# Patient Record
Sex: FEMALE | Race: BLACK OR AFRICAN AMERICAN | NOT HISPANIC OR LATINO | Employment: UNEMPLOYED | URBAN - METROPOLITAN AREA
[De-identification: names, ages, dates, MRNs, and addresses within clinical notes are randomized per-mention and may not be internally consistent; named-entity substitution may affect disease eponyms.]

---

## 2017-01-27 ENCOUNTER — HOSPITAL ENCOUNTER (EMERGENCY)
Facility: HOSPITAL | Age: 47
Discharge: HOME | End: 2017-01-27
Attending: EMERGENCY MEDICINE
Payer: MEDICAID

## 2017-01-27 VITALS
RESPIRATION RATE: 18 BRPM | TEMPERATURE: 99 F | SYSTOLIC BLOOD PRESSURE: 119 MMHG | HEART RATE: 80 BPM | HEIGHT: 62 IN | OXYGEN SATURATION: 97 % | DIASTOLIC BLOOD PRESSURE: 68 MMHG | BODY MASS INDEX: 31.28 KG/M2 | WEIGHT: 170 LBS

## 2017-01-27 DIAGNOSIS — R25.2 LEG CRAMPS: ICD-10-CM

## 2017-01-27 DIAGNOSIS — M79.10 MUSCLE PAIN: Primary | ICD-10-CM

## 2017-01-27 DIAGNOSIS — R79.89 ELEVATED D-DIMER: ICD-10-CM

## 2017-01-27 DIAGNOSIS — R07.9 CHEST PAIN: ICD-10-CM

## 2017-01-27 LAB
ALBUMIN SERPL BCP-MCNC: 4.2 G/DL
ALP SERPL-CCNC: 68 U/L
ALT SERPL W/O P-5'-P-CCNC: 14 U/L
ANION GAP SERPL CALC-SCNC: 14 MMOL/L
AST SERPL-CCNC: 21 U/L
BASOPHILS # BLD AUTO: 0.03 K/UL
BASOPHILS NFR BLD: 0.3 %
BILIRUB SERPL-MCNC: 1.1 MG/DL
BNP SERPL-MCNC: <10 PG/ML
BUN SERPL-MCNC: 14 MG/DL
CALCIUM SERPL-MCNC: 10 MG/DL
CHLORIDE SERPL-SCNC: 100 MMOL/L
CK SERPL-CCNC: 129 U/L
CO2 SERPL-SCNC: 24 MMOL/L
CREAT SERPL-MCNC: 0.9 MG/DL
D DIMER PPP IA.FEU-MCNC: 1.15 MG/L FEU
DIFFERENTIAL METHOD: ABNORMAL
EOSINOPHIL # BLD AUTO: 0.2 K/UL
EOSINOPHIL NFR BLD: 2 %
ERYTHROCYTE [DISTWIDTH] IN BLOOD BY AUTOMATED COUNT: 12.9 %
EST. GFR  (AFRICAN AMERICAN): >60 ML/MIN/1.73 M^2
EST. GFR  (NON AFRICAN AMERICAN): >60 ML/MIN/1.73 M^2
GLUCOSE SERPL-MCNC: 83 MG/DL
HCT VFR BLD AUTO: 42.1 %
HGB BLD-MCNC: 14.9 G/DL
LIPASE SERPL-CCNC: 33 U/L
LYMPHOCYTES # BLD AUTO: 3.6 K/UL
LYMPHOCYTES NFR BLD: 38.5 %
MCH RBC QN AUTO: 28.8 PG
MCHC RBC AUTO-ENTMCNC: 35.4 %
MCV RBC AUTO: 81 FL
MONOCYTES # BLD AUTO: 0.9 K/UL
MONOCYTES NFR BLD: 9.4 %
NEUTROPHILS # BLD AUTO: 4.6 K/UL
NEUTROPHILS NFR BLD: 49.8 %
PLATELET # BLD AUTO: 281 K/UL
PMV BLD AUTO: 10.7 FL
POTASSIUM SERPL-SCNC: 3.4 MMOL/L
PROT SERPL-MCNC: 8.3 G/DL
RBC # BLD AUTO: 5.18 M/UL
SODIUM SERPL-SCNC: 138 MMOL/L
TROPONIN I SERPL DL<=0.01 NG/ML-MCNC: <0.006 NG/ML
WBC # BLD AUTO: 9.23 K/UL

## 2017-01-27 PROCEDURE — 96374 THER/PROPH/DIAG INJ IV PUSH: CPT

## 2017-01-27 PROCEDURE — 83880 ASSAY OF NATRIURETIC PEPTIDE: CPT

## 2017-01-27 PROCEDURE — 84484 ASSAY OF TROPONIN QUANT: CPT

## 2017-01-27 PROCEDURE — 83690 ASSAY OF LIPASE: CPT

## 2017-01-27 PROCEDURE — 25000003 PHARM REV CODE 250: Performed by: EMERGENCY MEDICINE

## 2017-01-27 PROCEDURE — 25500020 PHARM REV CODE 255: Performed by: EMERGENCY MEDICINE

## 2017-01-27 PROCEDURE — 85379 FIBRIN DEGRADATION QUANT: CPT

## 2017-01-27 PROCEDURE — 96375 TX/PRO/DX INJ NEW DRUG ADDON: CPT

## 2017-01-27 PROCEDURE — 63600175 PHARM REV CODE 636 W HCPCS: Performed by: EMERGENCY MEDICINE

## 2017-01-27 PROCEDURE — 85025 COMPLETE CBC W/AUTO DIFF WBC: CPT

## 2017-01-27 PROCEDURE — 82550 ASSAY OF CK (CPK): CPT

## 2017-01-27 PROCEDURE — 99285 EMERGENCY DEPT VISIT HI MDM: CPT | Mod: 25

## 2017-01-27 PROCEDURE — 80053 COMPREHEN METABOLIC PANEL: CPT

## 2017-01-27 RX ORDER — MORPHINE SULFATE 10 MG/ML
4 INJECTION INTRAMUSCULAR; INTRAVENOUS; SUBCUTANEOUS
Status: COMPLETED | OUTPATIENT
Start: 2017-01-27 | End: 2017-01-27

## 2017-01-27 RX ORDER — OXYCODONE AND ACETAMINOPHEN 5; 325 MG/1; MG/1
1 TABLET ORAL EVERY 4 HOURS PRN
Qty: 20 TABLET | Refills: 0 | Status: SHIPPED | OUTPATIENT
Start: 2017-01-27 | End: 2017-11-10 | Stop reason: SDUPTHER

## 2017-01-27 RX ORDER — OXYCODONE AND ACETAMINOPHEN 5; 325 MG/1; MG/1
1 TABLET ORAL
Status: COMPLETED | OUTPATIENT
Start: 2017-01-27 | End: 2017-01-27

## 2017-01-27 RX ORDER — ONDANSETRON 2 MG/ML
4 INJECTION INTRAMUSCULAR; INTRAVENOUS
Status: COMPLETED | OUTPATIENT
Start: 2017-01-27 | End: 2017-01-27

## 2017-01-27 RX ORDER — METHOCARBAMOL 750 MG/1
1500 TABLET, FILM COATED ORAL 3 TIMES DAILY
Qty: 30 TABLET | Refills: 0 | Status: SHIPPED | OUTPATIENT
Start: 2017-01-27 | End: 2017-02-01

## 2017-01-27 RX ADMIN — IOHEXOL 70 ML: 350 INJECTION, SOLUTION INTRAVENOUS at 08:01

## 2017-01-27 RX ADMIN — OXYCODONE HYDROCHLORIDE AND ACETAMINOPHEN 1 TABLET: 5; 325 TABLET ORAL at 10:01

## 2017-01-27 RX ADMIN — MORPHINE SULFATE 4 MG: 10 INJECTION INTRAVENOUS at 07:01

## 2017-01-27 RX ADMIN — ONDANSETRON 4 MG: 2 INJECTION INTRAMUSCULAR; INTRAVENOUS at 07:01

## 2017-01-27 NOTE — ED TRIAGE NOTES
"Pt presents to [facility] due to leg pain bilaterally and numbness which pt states its "like prasanth horse" . Pt states pain started yesterday after a 4 hour plane flight.   "

## 2017-01-27 NOTE — DISCHARGE INSTRUCTIONS
Take medications as directed. Follow-up with your primary care doctor. Return for any new or worsening complaints.  General Neck and Back Pain    Both neck and back pain are usually caused by injury to the muscles or ligaments of the spine. Sometimes the disks that separate each bone of the spine may cause pain by pressing on a nearby nerve. Back and neck pain may appear after a sudden twisting or bending force (such as in a car accident), or sometimes after a simple awkward movement. In either case, muscle spasm is often present and adds to the pain.  Acute neck and back pain usually gets better in 1 to 2 weeks. Pain related to disk disease, arthritis in the spinal joints or spinal stenosis (narrowing of the spinal canal) can become chronic and last for months or years.  Back and neck pain are common problems. Most people feel better in 1 or 2 weeks, and most of the rest in 1 to 2 months. Most people can remain active.  People experience and describe pain differently.  · Pain can be sharp, stabbing, shooting, aching, cramping, or burning  · Movement, standing, bending, lifting, sitting, or walking may worsen the pain  · Pain can be localized to one spot or area, or it can be more generalized  · Pain can spread or radiate upwards, downwards, to the front, or go down your arms  · Muscle spasm may occur.  Most of the time mechanical problems with the muscles or spine cause the pain. it is usually caused by an injury, whether known or not, to the muscles or ligaments. While illnesses can cause back pain, it is usually not caused by a serious illness. Pain is usually related to physical activity, whether sports, exercise, work, or normal activity. Sometimes it can occur without an identifiable cause. This can happen simply by stretching or moving wrong, without noting pain at the time. Other causes include:  · Overexertion, lifting, pushing, pulling incorrectly or too aggressively.  · Sudden twisting, bending or  stretching from an accident (car or fall), or accidental movement.  · Poor posture  · Poor conditioning, lack of regular exercise  · Spinal disc disease or arthritis  · Stress  · Pregnancy, or illness like appendicitis, bladder or kidney infection, pelvic infections   Home care  · For neck pain: Use a comfortable pillow that supports the head and keeps the spine in a neutral position. The position of the head should not be tilted forward or backward.  · When in bed, try to find a position of comfort. A firm mattress is best. Try lying flat on your back with pillows under your knees. You can also try lying on your side with your knees bent up towards your chest and a pillow between your knees.  · At first, do not try to stretch out the sore spots. If there is a strain, it is not like the good soreness you get after exercising without an injury. In this case, stretching may make it worse.  · Avoid prolonged sitting, long car rides or travel. This puts more stress on the lower back than standing or walking.  · During the first 24 to 72 hours after an injury, apply an ice pack to the painful area for 20 minutes and then remove it for 20 minutes over a period of 60 to 90 minutes or several times a day.   · You can alternate ice and heat therapies. Talk with your healthcare provider about the best treatment for your back or neck pain. As a safety precaution, do not use a heating pad at bedtime. Sleeping with a heating pad can lead to skin burns or tissue damage.  · Therapeutic massage can help relax the back and neck muscles without stretching them.  · Be aware of safe lifting methods and do not lift anything over 15 pounds until all the pain is gone.  Medications  Talk to your healthcare provider before using medicine, especially if you have other medical problems or are taking other medicines.  · You may use over-the-counter medicine to control pain, unless another pain medicine was prescribed. If you have chronic  conditions like diabetes, liver or kidney disease, stomach ulcers,  gastrointestinal bleeding, or are taking blood thinner medicines.  · Be careful if you are given pain medicines, narcotics, or medicine for muscle spasm. They can cause drowsiness, and can affect your coordination, reflexes, and judgment. Do not drive or operate heavy machinery.  Follow-up care  Follow up with your healthcare provider, or as advised. Physical therapy or further tests may be needed.  If X-rays were taken, you will be notified of any new findings that may affect your care.  Call 911  Seek emergency medical care if any of the following occur:  · Trouble breathing  · Confusion  · Very drowsy or trouble awakening  · Fainting or loss of consciousness  · Rapid or very slow heart rate  · Loss of bowel or bladder control  When to seek medical advice  Call your healthcare provider right away if any of these occur:  · Pain becomes worse or spreads into your arms or legs  · Weakness, numbness or pain in one or both arms or legs  · Numbness in the groin area  · Difficulty walking  · Fever of 100.4ºF (38ºC) or higher, or as directed by your healthcare provider  © 2542-8491 [employer]. [address] [place] [address]. All rights reserved. This information is not intended as a substitute for professional medical care. Always follow your healthcare professional's instructions.

## 2017-01-27 NOTE — ED NOTES
"Pt reports pain 9/10 left shoulder and left kidney.  Also reports something is "wrong with her big toenails."  Physician notified.  "

## 2017-01-27 NOTE — ED AVS SNAPSHOT
[facility]  [place] [address]               Kylie Felix   2017  5:34 AM   ED    Description:  Female : 1970   Department:  [facility]           Your Care was Coordinated By:     Provider Role From To    Demetria Wilkes MD Attending Provider 17 0601 --      Reason for Visit     Leg Pain           Diagnoses this Visit        Comments    Muscle pain    -  Primary     Chest pain         Leg cramps         Elevated d-dimer           ED Disposition     ED Disposition Condition Comment    Discharge             To Do List           Follow-up Information     Follow up with Corina Diego MD. Schedule an appointment as soon as possible for a visit in 2 days.    Specialty:  Hospitalist    Contact information:    [address] [facility]  [place] [id number] [phone]          Follow up with Chaz Stewart III, MD. Schedule an appointment as soon as possible for a visit in 2 days.    Specialty:  Surgery    Contact information:    [address] [facility] [address] [phone]          Follow up with Karin Mancini DPM. Schedule an appointment as soon as possible for a visit in 2 days.    Specialty:  Podiatry    Contact information:    [address] [facility] [id number] [phone]         These Medications        Disp Refills Start End    oxycodone-acetaminophen (PERCOCET) 5-325 mg per tablet 20 tablet 0 2017     Take 1 tablet by mouth every 4 (four) hours as needed for Pain. - Oral    methocarbamol (ROBAXIN) 750 MG Tab 30 tablet 0 2017    Take 2 tablets (1,500 mg total) by mouth 3 (three) times daily. - Oral      [facility] On Call Nurse Care Line -  Assistance  Registered nurses in the [facility] provide clinical advisement, health education, appointment booking, and other advisory services.  Call for this free service at [phone].              Medications           Message regarding Medications     Verify the changes and/or additions to your medication regime listed below are the same as discussed with your clinician today.  If any of these changes or additions are incorrect, please notify your healthcare provider.        START taking these NEW medications        Refills    methocarbamol (ROBAXIN) 750 MG Tab 0    Sig: Take 2 tablets (1,500 mg total) by mouth 3 (three) times daily.    Class: Print    Route: Oral      These medications were administered today        Dose Freq    morphine injection 4 mg 4 mg ED 1 Time    Sig: Inject 0.4 mLs (4 mg total) into the vein ED 1 Time.    Class: Normal    Route: Intravenous    ondansetron injection 4 mg 4 mg ED 1 Time    Sig: Inject 4 mg into the vein ED 1 Time.    Class: Normal    Route: Intravenous    omnipaque 350 iohexol 70 mL 70 mL IMG once as needed    Sig: Inject 70 mLs into the vein ONCE PRN for contrast.    Class: Normal    Route: Intravenous    oxycodone-acetaminophen 5-325 mg per tablet 1 tablet 1 tablet ED 1 Time    Sig: Take 1 tablet by mouth ED 1 Time.    Class: Normal    Route: Oral      STOP taking these medications     lisinopril-hydrochlorothiazide (PRINZIDE,ZESTORETIC) 10-12.5 mg per tablet Take 1 tablet by mouth once daily.    naproxen (NAPROSYN) 375 MG tablet Take 1 tablet (375 mg total) by mouth 2 (two) times daily with meals.           Verify that the below list of medications is an accurate representation of the medications you are currently taking.  If none reported, the list may be blank. If incorrect, please contact your healthcare provider. Carry this list with you in case of emergency.           Current Medications     hydrochlorothiazide (HYDRODIURIL) 12.5 MG Tab Take 12.5 mg by mouth once daily.    ibuprofen (ADVIL,MOTRIN) 800 MG tablet Take 800 mg by mouth 3 (three) times daily.    acetaminophen (TYLENOL) 500 MG tablet Take 2 tablets (1,000 mg total) by mouth 3 (three) times daily  "as needed for Pain.    methocarbamol (ROBAXIN) 750 MG Tab Take 2 tablets (1,500 mg total) by mouth 3 (three) times daily.    oxycodone-acetaminophen (PERCOCET) 5-325 mg per tablet Take 1 tablet by mouth every 4 (four) hours as needed for Pain.    oxycodone-acetaminophen 5-325 mg per tablet 1 tablet Take 1 tablet by mouth ED 1 Time.           Clinical Reference Information           Your Vitals Were     BP Pulse Temp Resp Height Weight    121/75 (BP Location: Right arm, Patient Position: Lying, BP Method: Automatic) 78 98.5 °F (36.9 °C) (Oral) 18 5' 2" (1.575 m) 77.1 kg (170 lb)    Last Period SpO2 BMI          (LMP Unknown) 96% 31.09 kg/m2        Allergies as of 1/27/2017        Reactions    Sulfa (Sulfonamide Antibiotics) Hives, Swelling      Immunizations Administered on Date of Encounter - 1/27/2017     None      ED Micro, Lab, POCT     Start Ordered       Status Ordering Provider    01/27/17 0630 01/27/17 0630  CBC auto differential  STAT      Final result     01/27/17 0630 01/27/17 0630  Comprehensive metabolic panel  STAT      Final result     01/27/17 0630 01/27/17 0630  Lipase  STAT      Final result     01/27/17 0630 01/27/17 0630  D dimer, quantitative  STAT      Final result     01/27/17 0630 01/27/17 0630  CPK  STAT      Final result     01/27/17 0630 01/27/17 0630  Troponin I  STAT      Final result     01/27/17 0630 01/27/17 0630  Brain natriuretic peptide  STAT      Final result       ED Imaging Orders     Start Ordered       Status Ordering Provider    01/27/17 0859 01/27/17 0812  US Lower Extremity Veins Bilateral  1 time imaging      Final result     01/27/17 0813 01/27/17 0812  CTA Chest Non-Coronary (PE Study)  1 time imaging      Final result     01/27/17 0813 01/27/17 0812    1 time imaging,   Status:  Canceled      Canceled     01/27/17 0630 01/27/17 0630  X-Ray Chest AP Portable  1 time imaging      Final result         Discharge Instructions         Take medications as directed. Follow-up " with your primary care doctor. Return for any new or worsening complaints.  General Neck and Back Pain    Both neck and back pain are usually caused by injury to the muscles or ligaments of the spine. Sometimes the disks that separate each bone of the spine may cause pain by pressing on a nearby nerve. Back and neck pain may appear after a sudden twisting or bending force (such as in a car accident), or sometimes after a simple awkward movement. In either case, muscle spasm is often present and adds to the pain.  Acute neck and back pain usually gets better in 1 to 2 weeks. Pain related to disk disease, arthritis in the spinal joints or spinal stenosis (narrowing of the spinal canal) can become chronic and last for months or years.  Back and neck pain are common problems. Most people feel better in 1 or 2 weeks, and most of the rest in 1 to 2 months. Most people can remain active.  People experience and describe pain differently.  · Pain can be sharp, stabbing, shooting, aching, cramping, or burning  · Movement, standing, bending, lifting, sitting, or walking may worsen the pain  · Pain can be localized to one spot or area, or it can be more generalized  · Pain can spread or radiate upwards, downwards, to the front, or go down your arms  · Muscle spasm may occur.  Most of the time mechanical problems with the muscles or spine cause the pain. it is usually caused by an injury, whether known or not, to the muscles or ligaments. While illnesses can cause back pain, it is usually not caused by a serious illness. Pain is usually related to physical activity, whether sports, exercise, work, or normal activity. Sometimes it can occur without an identifiable cause. This can happen simply by stretching or moving wrong, without noting pain at the time. Other causes include:  · Overexertion, lifting, pushing, pulling incorrectly or too aggressively.  · Sudden twisting, bending or stretching from an accident (car or fall), or  accidental movement.  · Poor posture  · Poor conditioning, lack of regular exercise  · Spinal disc disease or arthritis  · Stress  · Pregnancy, or illness like appendicitis, bladder or kidney infection, pelvic infections   Home care  · For neck pain: Use a comfortable pillow that supports the head and keeps the spine in a neutral position. The position of the head should not be tilted forward or backward.  · When in bed, try to find a position of comfort. A firm mattress is best. Try lying flat on your back with pillows under your knees. You can also try lying on your side with your knees bent up towards your chest and a pillow between your knees.  · At first, do not try to stretch out the sore spots. If there is a strain, it is not like the good soreness you get after exercising without an injury. In this case, stretching may make it worse.  · Avoid prolonged sitting, long car rides or travel. This puts more stress on the lower back than standing or walking.  · During the first 24 to 72 hours after an injury, apply an ice pack to the painful area for 20 minutes and then remove it for 20 minutes over a period of 60 to 90 minutes or several times a day.   · You can alternate ice and heat therapies. Talk with your healthcare provider about the best treatment for your back or neck pain. As a safety precaution, do not use a heating pad at bedtime. Sleeping with a heating pad can lead to skin burns or tissue damage.  · Therapeutic massage can help relax the back and neck muscles without stretching them.  · Be aware of safe lifting methods and do not lift anything over 15 pounds until all the pain is gone.  Medications  Talk to your healthcare provider before using medicine, especially if you have other medical problems or are taking other medicines.  · You may use over-the-counter medicine to control pain, unless another pain medicine was prescribed. If you have chronic conditions like diabetes, liver or kidney disease,  stomach ulcers,  gastrointestinal bleeding, or are taking blood thinner medicines.  · Be careful if you are given pain medicines, narcotics, or medicine for muscle spasm. They can cause drowsiness, and can affect your coordination, reflexes, and judgment. Do not drive or operate heavy machinery.  Follow-up care  Follow up with your healthcare provider, or as advised. Physical therapy or further tests may be needed.  If X-rays were taken, you will be notified of any new findings that may affect your care.  Call 911  Seek emergency medical care if any of the following occur:  · Trouble breathing  · Confusion  · Very drowsy or trouble awakening  · Fainting or loss of consciousness  · Rapid or very slow heart rate  · Loss of bowel or bladder control  When to seek medical advice  Call your healthcare provider right away if any of these occur:  · Pain becomes worse or spreads into your arms or legs  · Weakness, numbness or pain in one or both arms or legs  · Numbness in the groin area  · Difficulty walking  · Fever of 100.4ºF (38ºC) or higher, or as directed by your healthcare provider  © 6491-6563 [employer]. [address] [place] [address]. All rights reserved. This information is not intended as a substitute for professional medical care. Always follow your healthcare professional's instructions.           [facility] complies with applicable Federal civil rights laws and does not discriminate on the basis of race, color, national origin, age, disability, or sex.        Language Assistance Services     ATTENTION: Language assistance services are available, free of charge. Please call [phone].      ATENCIÓN: Si habla español, tiene a duval disposición servicios gratuitos de asistencia lingüística. Llame al [phone].     CHÚ Ý: N?u b?n nói Ti?ng Vi?t, có các d?ch v? h? tr? ngôn ng? mi?n phí dành cho b?n. G?i s? [phone].

## 2017-01-27 NOTE — ED PROVIDER NOTES
Encounter Date: 2017    SCRIBE #1 NOTE: I, Ramin Arabella, am scribing for, and in the presence of, Demetria Wilkes MD. Other sections scribed: HPI, ROS.       History     Chief Complaint   Patient presents with    Leg Pain     pt made round trip flight 4 hr e[address], complains of numbness and tingling in her bilateral lower extremities     Review of patient's allergies indicates:   Allergen Reactions    Sulfa (sulfonamide antibiotics) Hives and Swelling     HPI Comments: CC: Leg Pain  HPI: This 46 y.o. female with HTN, fatty liver and Hx of gall stones, tumor removal, hysterectomy presents to the ED c/o severe cramping pains to bilateral legs and tingling in her toes that developed yesterday afternoon following a plane ride from [place]. He reports associated sharp, non-radiating episodic chest pain and SOB; Sx are acute. He denies Hx of similar Sx. She reports recent stress; she denies any recent hormone use, surgeries, or trauma.  Denies personal history of pulmonary embolism or DVT.  She denies fever, cough, N/V.      The history is provided by the patient.     Past Medical History   Diagnosis Date    Fatty liver     Gall stones 2015    Hypertension      No past medical history pertinent negatives.  Past Surgical History   Procedure Laterality Date     section, low transverse      Tumor removal      Hysterectomy       Family History   Problem Relation Age of Onset    Breast cancer Neg Hx     Colon cancer Neg Hx     Ovarian cancer Neg Hx      Social History   Substance Use Topics    Smoking status: Never Smoker    Smokeless tobacco: None    Alcohol use No     Review of Systems   Constitutional: Negative for activity change, chills and fever.   HENT: Negative for congestion, drooling, ear pain, sore throat, tinnitus, trouble swallowing and voice change.    Eyes: Negative for photophobia, pain and visual disturbance.   Respiratory: Positive for shortness of breath. Negative for apnea,  cough, choking, chest tightness, wheezing and stridor.    Cardiovascular: Positive for chest pain.   Gastrointestinal: Negative for abdominal distention, abdominal pain, constipation, diarrhea, nausea and vomiting.   Genitourinary: Negative for difficulty urinating, dysuria, flank pain, frequency and hematuria.   Musculoskeletal: Positive for myalgias (bilateral legs). Negative for arthralgias, back pain, gait problem, joint swelling, neck pain and neck stiffness.   Skin: Negative for color change, pallor, rash and wound.   Neurological: Negative for dizziness, seizures, syncope, weakness, light-headedness, numbness and headaches.        (+) tingling to bilateral feet   Psychiatric/Behavioral: Negative for agitation and confusion.       Physical Exam   Initial Vitals   BP Pulse Resp Temp SpO2   01/27/17 0529 01/27/17 0529 01/27/17 0529 01/27/17 0529 01/27/17 0529   135/84 94 18 98.5 °F (36.9 °C) 98 %     Physical Exam    Nursing note and vitals reviewed.  Constitutional: She appears well-developed and well-nourished. She is not diaphoretic. No distress.   HENT:   Head: Normocephalic and atraumatic.   Right Ear: External ear normal.   Left Ear: External ear normal.   Nose: Nose normal.   Mouth/Throat: Oropharynx is clear and moist. No oropharyngeal exudate.   Eyes: EOM are normal. Pupils are equal, round, and reactive to light. Right eye exhibits no discharge. Left eye exhibits no discharge.   Neck: Normal range of motion. Neck supple. No thyromegaly present. No tracheal deviation present. No JVD present.   Cardiovascular: Normal rate, regular rhythm, normal heart sounds and intact distal pulses. Exam reveals no gallop and no friction rub.    No murmur heard.  Pulmonary/Chest: Breath sounds normal. No stridor. No respiratory distress. She has no wheezes. She has no rhonchi. She has no rales. She exhibits no tenderness.   Abdominal: Soft. Bowel sounds are normal. She exhibits no distension. There is no tenderness.  There is no rebound and no guarding.   Musculoskeletal: Normal range of motion.   Lymphadenopathy:     She has no cervical adenopathy.   Neurological: She is alert and oriented to person, place, and time. She has normal strength. She displays normal reflexes. No cranial nerve deficit or sensory deficit.   Skin: Skin is warm and dry. No rash noted. No erythema. No pallor.   Psychiatric: She has a normal mood and affect. Thought content normal.         ED Course   Procedures  Labs Reviewed   CBC W/ AUTO DIFFERENTIAL - Abnormal; Notable for the following:        Result Value    MCV 81 (*)     All other components within normal limits   COMPREHENSIVE METABOLIC PANEL - Abnormal; Notable for the following:     Potassium 3.4 (*)     Total Bilirubin 1.1 (*)     All other components within normal limits   D DIMER, QUANTITATIVE - Abnormal; Notable for the following:     D-Dimer 1.15 (*)     All other components within normal limits   LIPASE   CK   TROPONIN I   B-TYPE NATRIURETIC PEPTIDE     EKG Readings: (Independently Interpreted)   Initial Reading: No STEMI. Rhythm: Normal Sinus Rhythm. Heart Rate: 80. Ectopy: No Ectopy. Conduction: Normal. ST Segments: Normal ST Segments. T Waves: Normal. Axis: Normal.       X-Rays:   Independently Interpreted Readings:   Other Readings:  Chest x-ray was obtained.  There is no focal consolidation, pneumothorax, pleural effusion or cardiac mildly.    Medical Decision Makin-year-old female with a history of hypertension presents to the emergency department complaining of crampy leg pain as well as chest pain and shortness of breath after a recent trip to [place] per son.  States her son was recently diagnosed with pulmonary embolism.  She has no personal history of such.  On exam she is afebrile with stable vital signs.  She is nontoxic appearing.  Differential diagnosis includes but is not limited to muscle strain versus rhabdomyolysis versus DVT versus coronary embolism versus  dehydration.  CBC and CMP are grossly unremarkable.  D-dimer is elevated.  CPK is normal.  Troponin is within normal limits.  Chest x-ray is unremarkable.  EKG is unremarkable.  Ultrasound the lower extremities was obtained given the patient's elevated d-dimer as well as complaints.  There is no evidence of DVT.  CT of the chest was also obtained and is negative for pulmonary was a murky pathology.  Patient was counseled on these results.  She was noted to have cholelithiasis which is known to the patient.  Jay sign is negative.  I doubt acute cholecystitis at this time.  LFTs are normal.  Lipase is normal.  There is no evidence of choledocholithiasis.  Patient was counseled the importance of close follow-up with her primary care physician as well as general surgery.  She also requested referral to podiatry clinic to clip her toenails.  Symptoms have resolved with morphine as well as Zofran.  Will discharge home with muscle relaxer as well as pain medication.  She agrees follow-up with primary care in general surgery as above.  Return precautions were given.  She agrees this plan.            Scribe Attestation:   Scribe #1: I performed the above scribed service and the documentation accurately describes the services I performed. I attest to the accuracy of the note.    Attending Attestation:           Physician Attestation for Scribe:  Physician Attestation Statement for Scribe #1: I, Demetria Wilkes MD, reviewed documentation, as scribed by Ramin Bell in my presence, and it is both accurate and complete.                 ED Course     Clinical Impression:   The primary encounter diagnosis was Muscle pain. Diagnoses of Chest pain, Leg cramps, and Elevated d-dimer were also pertinent to this visit.          Demetria Wilkes MD  01/27/17 3998

## 2017-06-27 ENCOUNTER — HOSPITAL ENCOUNTER (OUTPATIENT)
Dept: RADIOLOGY | Facility: HOSPITAL | Age: 47
Discharge: HOME | End: 2017-06-27
Attending: PODIATRIST
Payer: MEDICAID

## 2017-06-27 DIAGNOSIS — M72.2 PLANTAR FASCIAL FIBROMATOSIS: ICD-10-CM

## 2017-06-27 DIAGNOSIS — M79.671 RIGHT FOOT PAIN: ICD-10-CM

## 2017-06-27 DIAGNOSIS — M79.672 LEFT FOOT PAIN: ICD-10-CM

## 2017-06-27 DIAGNOSIS — M72.2 PLANTAR FASCIAL FIBROMATOSIS: Primary | ICD-10-CM

## 2017-06-27 PROCEDURE — 73630 X-RAY EXAM OF FOOT: CPT | Mod: TC,RT

## 2017-06-27 PROCEDURE — 73630 X-RAY EXAM OF FOOT: CPT | Mod: 26,50,77, | Performed by: RADIOLOGY

## 2017-06-27 PROCEDURE — 73630 X-RAY EXAM OF FOOT: CPT | Mod: 26,LT,, | Performed by: RADIOLOGY

## 2017-06-27 PROCEDURE — 73630 X-RAY EXAM OF FOOT: CPT | Mod: TC,LT

## 2017-10-14 ENCOUNTER — HOSPITAL ENCOUNTER (EMERGENCY)
Facility: HOSPITAL | Age: 47
Discharge: HOME | End: 2017-10-14
Payer: MEDICAID

## 2017-10-14 VITALS
DIASTOLIC BLOOD PRESSURE: 74 MMHG | SYSTOLIC BLOOD PRESSURE: 151 MMHG | OXYGEN SATURATION: 97 % | HEART RATE: 74 BPM | BODY MASS INDEX: 31.15 KG/M2 | RESPIRATION RATE: 18 BRPM | WEIGHT: 165 LBS | HEIGHT: 61 IN | TEMPERATURE: 99 F

## 2017-10-14 DIAGNOSIS — R51.9 NONINTRACTABLE HEADACHE, UNSPECIFIED CHRONICITY PATTERN, UNSPECIFIED HEADACHE TYPE: Primary | ICD-10-CM

## 2017-10-14 PROCEDURE — 96372 THER/PROPH/DIAG INJ SC/IM: CPT

## 2017-10-14 PROCEDURE — 25000003 PHARM REV CODE 250: Performed by: PHYSICIAN ASSISTANT

## 2017-10-14 PROCEDURE — 63600175 PHARM REV CODE 636 W HCPCS: Performed by: PHYSICIAN ASSISTANT

## 2017-10-14 PROCEDURE — 99284 EMERGENCY DEPT VISIT MOD MDM: CPT | Mod: 25

## 2017-10-14 RX ORDER — DIPHENHYDRAMINE HCL 25 MG
25 CAPSULE ORAL 3 TIMES DAILY PRN
Qty: 20 CAPSULE | Refills: 0 | Status: SHIPPED | OUTPATIENT
Start: 2017-10-14 | End: 2017-11-10 | Stop reason: SDUPTHER

## 2017-10-14 RX ORDER — KETOROLAC TROMETHAMINE 10 MG/1
10 TABLET, FILM COATED ORAL 3 TIMES DAILY PRN
Qty: 20 TABLET | Refills: 0 | Status: SHIPPED | OUTPATIENT
Start: 2017-10-14 | End: 2017-11-10 | Stop reason: SDUPTHER

## 2017-10-14 RX ORDER — DIPHENHYDRAMINE HCL 25 MG
25 CAPSULE ORAL
Status: COMPLETED | OUTPATIENT
Start: 2017-10-14 | End: 2017-10-14

## 2017-10-14 RX ORDER — KETOROLAC TROMETHAMINE 30 MG/ML
10 INJECTION, SOLUTION INTRAMUSCULAR; INTRAVENOUS
Status: COMPLETED | OUTPATIENT
Start: 2017-10-14 | End: 2017-10-14

## 2017-10-14 RX ORDER — PROCHLORPERAZINE MALEATE 10 MG
10 TABLET ORAL 3 TIMES DAILY PRN
Qty: 20 TABLET | Refills: 0 | Status: SHIPPED | OUTPATIENT
Start: 2017-10-14 | End: 2017-11-10 | Stop reason: SDUPTHER

## 2017-10-14 RX ORDER — PROCHLORPERAZINE MALEATE 5 MG
10 TABLET ORAL
Status: COMPLETED | OUTPATIENT
Start: 2017-10-14 | End: 2017-10-14

## 2017-10-14 RX ORDER — HYDROCHLOROTHIAZIDE 12.5 MG/1
12.5 TABLET ORAL ONCE
Status: COMPLETED | OUTPATIENT
Start: 2017-10-14 | End: 2017-10-14

## 2017-10-14 RX ADMIN — HYDROCHLOROTHIAZIDE 12.5 MG: 12.5 TABLET ORAL at 04:10

## 2017-10-14 RX ADMIN — PROCHLORPERAZINE MALEATE 10 MG: 5 TABLET, FILM COATED ORAL at 05:10

## 2017-10-14 RX ADMIN — KETOROLAC TROMETHAMINE 10 MG: 30 INJECTION, SOLUTION INTRAMUSCULAR at 05:10

## 2017-10-14 RX ADMIN — DIPHENHYDRAMINE HYDROCHLORIDE 25 MG: 25 CAPSULE ORAL at 05:10
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
## 2017-10-14 NOTE — DISCHARGE INSTRUCTIONS
To abort headache, take these medications together.  Follow-up with primary care physician this week for reevaluation and further management of headaches.  Return to this ED if symptoms persist, if headache worsens, or if any other problems occur.

## 2017-10-14 NOTE — ED TRIAGE NOTES
C/o intermittent  HA   X 1 month. ALSO c/o lt. [place] pressure and Subconjunctival hemorrhage to lt. Eye noted today.  No OTC meds taken today. Reports nausea and light sensitivity, watery eyes x 3 days.

## 2017-11-10 ENCOUNTER — HOSPITAL ENCOUNTER (EMERGENCY)
Facility: OTHER | Age: 47
Discharge: HOME | End: 2017-11-10
Attending: INTERNAL MEDICINE
Payer: MEDICAID

## 2017-11-10 VITALS
RESPIRATION RATE: 17 BRPM | HEART RATE: 84 BPM | DIASTOLIC BLOOD PRESSURE: 79 MMHG | TEMPERATURE: 99 F | HEIGHT: 61 IN | WEIGHT: 170 LBS | SYSTOLIC BLOOD PRESSURE: 136 MMHG | BODY MASS INDEX: 32.1 KG/M2 | OXYGEN SATURATION: 100 %

## 2017-11-10 DIAGNOSIS — B34.9 ACUTE VIRAL SYNDROME: Primary | ICD-10-CM

## 2017-11-10 PROCEDURE — 99283 EMERGENCY DEPT VISIT LOW MDM: CPT

## 2017-11-10 RX ORDER — FLUTICASONE PROPIONATE 50 MCG
2 SPRAY, SUSPENSION (ML) NASAL DAILY
Qty: 15 G | Refills: 0 | Status: SHIPPED | OUTPATIENT
Start: 2017-11-10 | End: 2018-09-23

## 2017-11-10 RX ORDER — PROMETHAZINE HYDROCHLORIDE 6.25 MG/5ML
25 SYRUP ORAL EVERY 6 HOURS PRN
COMMUNITY
End: 2018-09-23

## 2017-11-10 RX ORDER — BENZONATATE 100 MG/1
100 CAPSULE ORAL 3 TIMES DAILY PRN
COMMUNITY
End: 2018-09-23

## 2017-11-10 RX ORDER — AZELASTINE 1 MG/ML
2 SPRAY, METERED NASAL 2 TIMES DAILY
Qty: 30 ML | Refills: 0 | Status: SHIPPED | OUTPATIENT
Start: 2017-11-10 | End: 2018-09-23

## 2017-11-10 RX ORDER — IBUPROFEN 800 MG/1
800 TABLET ORAL EVERY 8 HOURS PRN
Qty: 30 TABLET | Refills: 0 | Status: SHIPPED | OUTPATIENT
Start: 2017-11-10 | End: 2018-09-23

## 2017-11-11 NOTE — ED PROVIDER NOTES
Encounter Date: 11/10/2017       History     Chief Complaint   Patient presents with    Headache     C/O headache 10/10 today associated with fever and cough. Was seen at urgent care 2017 for upper respiratory and post nasal drip.     47-year-old female presents to the emergency department complaining of headache, body aches and nasal congestion for several days.  She was recently seen in urgent care and given prescriptions for Augmentin, prednisone and Tessalon Perles but states her symptoms have worsened.      The history is provided by the patient. No  was used.   URI   The primary symptoms include sore throat, cough and myalgias. The current episode started several days ago. This is a new problem. The problem has not changed since onset.  Myalgias began 3 to 5 days ago. The myalgias have been unchanged since their onset. The myalgias are generalized. The myalgias are aching. The myalgias are not associated with swelling. The myalgia pain is at a severity of 3/10.   The onset of the illness is associated with exposure to sick contacts. Symptoms associated with the illness include congestion and rhinorrhea.     Review of patient's allergies indicates:   Allergen Reactions    Sulfa (sulfonamide antibiotics) Hives and Swelling     Past Medical History:   Diagnosis Date    Fatty liver     Gall stones 2015    Hypertension      Past Surgical History:   Procedure Laterality Date     SECTION, LOW TRANSVERSE      HYSTERECTOMY      TUMOR REMOVAL       Family History   Problem Relation Age of Onset    Breast cancer Neg Hx     Colon cancer Neg Hx     Ovarian cancer Neg Hx      Social History   Substance Use Topics    Smoking status: Never Smoker    Smokeless tobacco: Never Used    Alcohol use No     Review of Systems   HENT: Positive for congestion, postnasal drip, rhinorrhea and sore throat.    Respiratory: Positive for cough.    Musculoskeletal: Positive for myalgias.   All  other systems reviewed and are negative.      Physical Exam     Initial Vitals [11/10/17 2118]   BP Pulse Resp Temp SpO2   139/85 88 18 99.6 °F (37.6 °C) 99 %      MAP       103         Physical Exam    Nursing note and vitals reviewed.  Constitutional: She appears well-developed and well-nourished.   HENT:   Head: Normocephalic and atraumatic.   Right Ear: External ear normal.   Left Ear: External ear normal.   Mouth/Throat: No oropharyngeal exudate.   Clear nasal discharge, clear postnasal drip, posterior oropharyngeal erythema without exudate or edema, enlarged nasal turbinates   Eyes: Conjunctivae and EOM are normal. Pupils are equal, round, and reactive to light.   Neck: Normal range of motion. Neck supple.   Cardiovascular: Normal rate and regular rhythm.   Pulmonary/Chest: Breath sounds normal. No respiratory distress. She has no wheezes. She has no rales.   Abdominal: Soft. Bowel sounds are normal.   Musculoskeletal: Normal range of motion.   Neurological: She is alert and oriented to person, place, and time.   Skin: Skin is warm and dry.   Psychiatric: She has a normal mood and affect.         ED Course   Procedures  Labs Reviewed - No data to display          Medical Decision Making:   Initial Assessment:   47-year-old female presents to the emergency department complaining of headache, body aches and nasal congestion for several days.  She was recently seen in urgent care and given prescriptions for Augmentin, prednisone and Tessalon Perles but states her symptoms have worsened.    Differential Diagnosis:   Influenza  Acute nasopharyngitis  Pneumonia  Bronchitis  ED Management:  Patient was given instructions for acute nasopharyngitis and prescriptions for Astelin, fluticasone and ibuprofen.  She was advised to follow-up with her primary care physician within the next 3 days for reevaluation.                   ED Course      Clinical Impression:   The encounter diagnosis was Acute viral  syndrome.    Disposition:   Disposition: Discharged  Condition: Stable                        Naveen Alba MD  11/10/17 4654

## 2018-06-11 ENCOUNTER — HOSPITAL ENCOUNTER (EMERGENCY)
Facility: HOSPITAL | Age: 48
Discharge: HOME | End: 2018-06-11
Attending: EMERGENCY MEDICINE
Payer: MEDICAID

## 2018-06-11 VITALS
TEMPERATURE: 98 F | DIASTOLIC BLOOD PRESSURE: 56 MMHG | WEIGHT: 170 LBS | HEART RATE: 96 BPM | BODY MASS INDEX: 32.1 KG/M2 | OXYGEN SATURATION: 99 % | SYSTOLIC BLOOD PRESSURE: 115 MMHG | RESPIRATION RATE: 20 BRPM | HEIGHT: 61 IN

## 2018-06-11 DIAGNOSIS — K29.70 GASTRITIS, PRESENCE OF BLEEDING UNSPECIFIED, UNSPECIFIED CHRONICITY, UNSPECIFIED GASTRITIS TYPE: Primary | ICD-10-CM

## 2018-06-11 DIAGNOSIS — R07.9 CHEST PAIN: ICD-10-CM

## 2018-06-11 LAB
ALBUMIN SERPL BCP-MCNC: 3.8 G/DL
ALP SERPL-CCNC: 54 U/L
ALT SERPL W/O P-5'-P-CCNC: 18 U/L
ANION GAP SERPL CALC-SCNC: 13 MMOL/L
AST SERPL-CCNC: 20 U/L
BASOPHILS # BLD AUTO: 0.02 K/UL
BASOPHILS NFR BLD: 0.3 %
BILIRUB SERPL-MCNC: 0.7 MG/DL
BNP SERPL-MCNC: 14 PG/ML
BUN SERPL-MCNC: 12 MG/DL
CALCIUM SERPL-MCNC: 9.5 MG/DL
CHLORIDE SERPL-SCNC: 107 MMOL/L
CO2 SERPL-SCNC: 22 MMOL/L
CREAT SERPL-MCNC: 0.8 MG/DL
D DIMER PPP IA.FEU-MCNC: 1.66 MG/L FEU
DIFFERENTIAL METHOD: ABNORMAL
EOSINOPHIL # BLD AUTO: 0.1 K/UL
EOSINOPHIL NFR BLD: 1.8 %
ERYTHROCYTE [DISTWIDTH] IN BLOOD BY AUTOMATED COUNT: 13.3 %
EST. GFR  (AFRICAN AMERICAN): >60 ML/MIN/1.73 M^2
EST. GFR  (NON AFRICAN AMERICAN): >60 ML/MIN/1.73 M^2
GLUCOSE SERPL-MCNC: 104 MG/DL
HCT VFR BLD AUTO: 37.3 %
HGB BLD-MCNC: 13.6 G/DL
LYMPHOCYTES # BLD AUTO: 3.7 K/UL
LYMPHOCYTES NFR BLD: 46.9 %
MCH RBC QN AUTO: 28.4 PG
MCHC RBC AUTO-ENTMCNC: 36.5 G/DL
MCV RBC AUTO: 78 FL
MONOCYTES # BLD AUTO: 0.4 K/UL
MONOCYTES NFR BLD: 5.5 %
NEUTROPHILS # BLD AUTO: 3.6 K/UL
NEUTROPHILS NFR BLD: 45.4 %
PLATELET # BLD AUTO: 253 K/UL
PMV BLD AUTO: 10.6 FL
POTASSIUM SERPL-SCNC: 3.5 MMOL/L
PROT SERPL-MCNC: 7.1 G/DL
RBC # BLD AUTO: 4.79 M/UL
SODIUM SERPL-SCNC: 142 MMOL/L
TROPONIN I SERPL DL<=0.01 NG/ML-MCNC: <0.006 NG/ML
TROPONIN I SERPL DL<=0.01 NG/ML-MCNC: <0.006 NG/ML
WBC # BLD AUTO: 7.85 K/UL

## 2018-06-11 PROCEDURE — 63600175 PHARM REV CODE 636 W HCPCS: Performed by: EMERGENCY MEDICINE

## 2018-06-11 PROCEDURE — 96365 THER/PROPH/DIAG IV INF INIT: CPT

## 2018-06-11 PROCEDURE — 83880 ASSAY OF NATRIURETIC PEPTIDE: CPT

## 2018-06-11 PROCEDURE — 85379 FIBRIN DEGRADATION QUANT: CPT

## 2018-06-11 PROCEDURE — 80053 COMPREHEN METABOLIC PANEL: CPT

## 2018-06-11 PROCEDURE — 99285 EMERGENCY DEPT VISIT HI MDM: CPT | Mod: 25

## 2018-06-11 PROCEDURE — 93005 ELECTROCARDIOGRAM TRACING: CPT

## 2018-06-11 PROCEDURE — 85025 COMPLETE CBC W/AUTO DIFF WBC: CPT

## 2018-06-11 PROCEDURE — 96375 TX/PRO/DX INJ NEW DRUG ADDON: CPT | Mod: 59

## 2018-06-11 PROCEDURE — 96366 THER/PROPH/DIAG IV INF ADDON: CPT

## 2018-06-11 PROCEDURE — 25000003 PHARM REV CODE 250: Performed by: EMERGENCY MEDICINE

## 2018-06-11 PROCEDURE — 84484 ASSAY OF TROPONIN QUANT: CPT | Mod: 91

## 2018-06-11 PROCEDURE — 93010 ELECTROCARDIOGRAM REPORT: CPT | Mod: ,,, | Performed by: INTERNAL MEDICINE

## 2018-06-11 PROCEDURE — 25500020 PHARM REV CODE 255: Performed by: EMERGENCY MEDICINE

## 2018-06-11 PROCEDURE — C9113 INJ PANTOPRAZOLE SODIUM, VIA: HCPCS | Performed by: EMERGENCY MEDICINE

## 2018-06-11 RX ORDER — SUCRALFATE 1 G/10ML
1 SUSPENSION ORAL
Status: COMPLETED | OUTPATIENT
Start: 2018-06-11 | End: 2018-06-11

## 2018-06-11 RX ORDER — SUCRALFATE 1 G/1
1 TABLET ORAL 4 TIMES DAILY
Qty: 28 TABLET | Refills: 0 | Status: SHIPPED | OUTPATIENT
Start: 2018-06-11 | End: 2018-09-23

## 2018-06-11 RX ORDER — NITROGLYCERIN 0.4 MG/1
0.4 TABLET SUBLINGUAL EVERY 5 MIN PRN
Status: DISCONTINUED | OUTPATIENT
Start: 2018-06-11 | End: 2018-06-11 | Stop reason: HOSPADM

## 2018-06-11 RX ORDER — METOCLOPRAMIDE HYDROCHLORIDE 5 MG/ML
5 INJECTION INTRAMUSCULAR; INTRAVENOUS
Status: COMPLETED | OUTPATIENT
Start: 2018-06-11 | End: 2018-06-11

## 2018-06-11 RX ORDER — MORPHINE SULFATE 10 MG/ML
5 INJECTION INTRAMUSCULAR; INTRAVENOUS; SUBCUTANEOUS
Status: COMPLETED | OUTPATIENT
Start: 2018-06-11 | End: 2018-06-11

## 2018-06-11 RX ORDER — SUCRALFATE 1 G/1
1 TABLET ORAL 4 TIMES DAILY
Qty: 28 TABLET | Refills: 0 | Status: SHIPPED | OUTPATIENT
Start: 2018-06-11 | End: 2018-06-11

## 2018-06-11 RX ORDER — ASPIRIN 325 MG
325 TABLET, DELAYED RELEASE (ENTERIC COATED) ORAL
Status: COMPLETED | OUTPATIENT
Start: 2018-06-11 | End: 2018-06-11

## 2018-06-11 RX ORDER — PANTOPRAZOLE SODIUM 40 MG/1
40 TABLET, DELAYED RELEASE ORAL DAILY
COMMUNITY
End: 2018-06-11 | Stop reason: DRUGHIGH

## 2018-06-11 RX ORDER — PANTOPRAZOLE SODIUM 40 MG/1
40 TABLET, DELAYED RELEASE ORAL 2 TIMES DAILY
Qty: 60 TABLET | Refills: 0 | Status: SHIPPED | OUTPATIENT
Start: 2018-06-11 | End: 2018-06-11

## 2018-06-11 RX ORDER — PANTOPRAZOLE SODIUM 40 MG/1
40 TABLET, DELAYED RELEASE ORAL 2 TIMES DAILY
Qty: 60 TABLET | Refills: 0 | Status: SHIPPED | OUTPATIENT
Start: 2018-06-11 | End: 2020-05-23 | Stop reason: SDUPTHER

## 2018-06-11 RX ORDER — PANTOPRAZOLE SODIUM 40 MG/10ML
40 INJECTION, POWDER, LYOPHILIZED, FOR SOLUTION INTRAVENOUS
Status: DISCONTINUED | OUTPATIENT
Start: 2018-06-11 | End: 2018-06-11 | Stop reason: CLARIF

## 2018-06-11 RX ADMIN — DEXTROSE 40 MG: 50 INJECTION, SOLUTION INTRAVENOUS at 11:06

## 2018-06-11 RX ADMIN — SUCRALFATE 1 G: 1 SUSPENSION ORAL at 11:06

## 2018-06-11 RX ADMIN — IOHEXOL 75 ML: 350 INJECTION, SOLUTION INTRAVENOUS at 10:06

## 2018-06-11 RX ADMIN — ASPIRIN 325 MG: 325 TABLET, DELAYED RELEASE ORAL at 09:06

## 2018-06-11 RX ADMIN — LIDOCAINE HYDROCHLORIDE: 20 SOLUTION ORAL; TOPICAL at 08:06

## 2018-06-11 RX ADMIN — NITROGLYCERIN 0.4 MG: 0.4 TABLET SUBLINGUAL at 09:06

## 2018-06-11 RX ADMIN — SODIUM CHLORIDE 1000 ML: 0.9 INJECTION, SOLUTION INTRAVENOUS at 11:06

## 2018-06-11 RX ADMIN — METOCLOPRAMIDE 5 MG: 5 INJECTION, SOLUTION INTRAMUSCULAR; INTRAVENOUS at 10:06

## 2018-06-11 RX ADMIN — MORPHINE SULFATE 5 MG: 10 INJECTION INTRAVENOUS at 10:06

## 2018-06-11 NOTE — ED TRIAGE NOTES
Patient presents with c/o mid sternal chest pain 10/10 no radiation with nausea and vomiting since this morning.

## 2018-06-11 NOTE — ED PROVIDER NOTES
"Encounter Date: 2018    SCRIBE #1 NOTE: I, Kelvin Okeefe, am scribing for, and in the presence of,  Blue Flores MD. I have scribed the following portions of the note - Other sections scribed: HPI, ROS.       History     Chief Complaint   Patient presents with    Chest Pain     midsternal chest pain that started about 20 minutes ago. Pt reports 1 episode of vomiting when chest pain started. States "It's like a burning pain. It woke me up out of my sleep"     CC: Chest Pain    HPI: 46 y/o female with medical hx of GERD, HTN, and gall stones reports to the ED c/o acute, constant, severe (10/10) chest pain that started 30 minutes PTA. Pt describes the pain as a "burning" sensation in her chest and has no hx of this pain. Pt reports taking Protonix daily for her GERD and having an EGD in March with no ulcers or Biopsys. Pt is also c/o SOB, nausea, and x1 episode of emesis. Pt denies smoking, alcohol use, and IV drug use. Pt also denies abdominal pain, back pain, diarrhea, headaches, numbness, dysuria, fever, chills, or any other associated symptoms. No modifying factors or tx PTA.       The history is provided by the patient. No  was used.     Review of patient's allergies indicates:   Allergen Reactions    Sulfa (sulfonamide antibiotics) Hives and Swelling     Past Medical History:   Diagnosis Date    Fatty liver     Gall stones 2015    Hypertension      Past Surgical History:   Procedure Laterality Date     SECTION, LOW TRANSVERSE      HYSTERECTOMY      TUMOR REMOVAL       Family History   Problem Relation Age of Onset    Breast cancer Neg Hx     Colon cancer Neg Hx     Ovarian cancer Neg Hx      Social History   Substance Use Topics    Smoking status: Never Smoker    Smokeless tobacco: Never Used    Alcohol use No     Review of Systems   Constitutional: Negative for chills and fever.   HENT: Negative for ear pain, facial swelling, rhinorrhea and sore throat.  "   Eyes: Negative for pain and redness.   Respiratory: Positive for shortness of breath.    Cardiovascular: Positive for chest pain.   Gastrointestinal: Positive for nausea and vomiting (x1 episode). Negative for abdominal pain and diarrhea.   Genitourinary: Negative for dysuria, flank pain, frequency and urgency.   Musculoskeletal: Negative for back pain.   Skin: Negative for rash.   Neurological: Negative for light-headedness, numbness and headaches.   All other systems reviewed and are negative.      Physical Exam     Initial Vitals [06/11/18 0813]   BP Pulse Resp Temp SpO2   (!) 158/121 99 20 99.2 °F (37.3 °C) 98 %      MAP       133.33         Physical Exam    Nursing note and vitals reviewed.  Constitutional: She appears well-developed and well-nourished.   Eyes: EOM are normal. Pupils are equal, round, and reactive to light.   Neck: Normal range of motion. Neck supple. No thyromegaly present. No JVD present.   Cardiovascular: Normal rate, regular rhythm, normal heart sounds and intact distal pulses. Exam reveals no gallop and no friction rub.    No murmur heard.  Pulmonary/Chest: Breath sounds normal. No respiratory distress. She has no wheezes. She has no rhonchi. She has no rales. She exhibits no tenderness.   Abdominal: Soft. Bowel sounds are normal. She exhibits no distension and no mass. There is no tenderness. There is no rebound and no guarding.   Musculoskeletal: Normal range of motion. She exhibits no edema or tenderness.   Neurological: She is alert and oriented to person, place, and time. She has normal strength.   Skin: Skin is warm and dry. Capillary refill takes less than 2 seconds. No rash noted.         ED Course   Procedures  Labs Reviewed   CBC W/ AUTO DIFFERENTIAL - Abnormal; Notable for the following:        Result Value    MCV 78 (*)     MCHC 36.5 (*)     All other components within normal limits   COMPREHENSIVE METABOLIC PANEL - Abnormal; Notable for the following:     CO2 22 (*)      Alkaline Phosphatase 54 (*)     All other components within normal limits   D DIMER, QUANTITATIVE - Abnormal; Notable for the following:     D-Dimer 1.66 (*)     All other components within normal limits   TROPONIN I   B-TYPE NATRIURETIC PEPTIDE   TROPONIN I     EKG Readings: (Independently Interpreted)   Initial Reading: No STEMI. Rhythm: Normal Sinus Rhythm. Heart Rate: 98. Ectopy: PVCs. ST Segments: Normal ST Segments.   Diffuse nonspecific t wave abnormality.        CTA Chest Non-Coronary (PE Study)   Final Result   Abnormal      No pulmonary embolus or other acute chest process.      Thickening of the anterior gastric wall at the fundus.  Findings are somewhat nonspecific, but endoscopy may be indicated for further evaluation.      This report was flagged in Epic as abnormal.         Electronically signed by: Richard Ventura MD   Date:    06/11/2018   Time:    10:44      X-Ray Chest AP Portable   Final Result      No acute abnormality.         Electronically signed by: Luz Renae MD   Date:    06/11/2018   Time:    09:07          patient presents with mid chest burning.  Worse with lying down.  History of GERD.  States compliance with Protonix daily.  No shortness of breath. Symptoms are not exertional.  Patient does have nausea and vomited 1 time.  States no blood.  Normal bowel movements.  Benign abdominal exam.  EKG shows nonspecific T-wave abnormalities but otherwise no acute abnormalities.  Patient has negative troponins x2.  Patient has a low heart score 2.  Symptoms more consistent gastroesophageal reflux.  With GI cocktail, IV Protonix, p.o. Carafate patient states significant improvement.  Patient did have an elevated D-dimer.  CT of the chest shows no pulmonary embolus or abnormal vasculature.  Does show inflammation to the fundus of the stomach.  I think this is causing patient's symptomatology.  Will increase her Protonix to b.i.d. and add Carafate for 1 week.  Follow up with GI.  Return for  new or worsening symptoms.     Additional MDM:   Heart Score:    History:          Slightly suspicious.  ECG:             Normal  Age:               45-65 years  Risk factors: 1-2 risk factors  Troponin:       Less than or equal to normal limit  Final Score: 2             Scribe Attestation:   Scribe #1: I performed the above scribed service and the documentation accurately describes the services I performed. I attest to the accuracy of the note.    Attending Attestation:           Physician Attestation for Scribe:  Physician Attestation Statement for Scribe #1: I, Blue Flores MD, reviewed documentation, as scribed by Kelvin Okeefe in my presence, and it is both accurate and complete.                    Clinical Impression:   The primary encounter diagnosis was Gastritis, presence of bleeding unspecified, unspecified chronicity, unspecified gastritis type. A diagnosis of Chest pain was also pertinent to this visit.                             Blue Flores MD  06/12/18 1700

## 2018-07-09 ENCOUNTER — HOSPITAL ENCOUNTER (EMERGENCY)
Facility: HOSPITAL | Age: 48
Discharge: HOME | End: 2018-07-09
Attending: EMERGENCY MEDICINE
Payer: MEDICAID

## 2018-07-09 VITALS
SYSTOLIC BLOOD PRESSURE: 145 MMHG | WEIGHT: 169 LBS | TEMPERATURE: 98 F | RESPIRATION RATE: 18 BRPM | OXYGEN SATURATION: 98 % | HEIGHT: 61 IN | BODY MASS INDEX: 31.91 KG/M2 | DIASTOLIC BLOOD PRESSURE: 79 MMHG | HEART RATE: 84 BPM

## 2018-07-09 DIAGNOSIS — M25.511 ACUTE PAIN OF RIGHT SHOULDER: Primary | ICD-10-CM

## 2018-07-09 DIAGNOSIS — M25.551 BILATERAL HIP PAIN: ICD-10-CM

## 2018-07-09 DIAGNOSIS — M25.552 BILATERAL HIP PAIN: ICD-10-CM

## 2018-07-09 PROCEDURE — 25000003 PHARM REV CODE 250: Performed by: EMERGENCY MEDICINE

## 2018-07-09 PROCEDURE — 99283 EMERGENCY DEPT VISIT LOW MDM: CPT

## 2018-07-09 RX ORDER — CYCLOBENZAPRINE HCL 10 MG
10 TABLET ORAL ONCE
Status: COMPLETED | OUTPATIENT
Start: 2018-07-09 | End: 2018-07-09

## 2018-07-09 RX ORDER — CYCLOBENZAPRINE HCL 10 MG
10 TABLET ORAL 3 TIMES DAILY PRN
Qty: 20 TABLET | Refills: 0 | Status: SHIPPED | OUTPATIENT
Start: 2018-07-09 | End: 2018-07-19

## 2018-07-09 RX ORDER — TRAMADOL HYDROCHLORIDE 50 MG/1
50 TABLET ORAL EVERY 6 HOURS PRN
Qty: 20 TABLET | Refills: 0 | Status: SHIPPED | OUTPATIENT
Start: 2018-07-09 | End: 2018-07-19

## 2018-07-09 RX ORDER — TRAMADOL HYDROCHLORIDE 50 MG/1
50 TABLET ORAL ONCE
Status: COMPLETED | OUTPATIENT
Start: 2018-07-09 | End: 2018-07-09

## 2018-07-09 RX ADMIN — CYCLOBENZAPRINE HYDROCHLORIDE 10 MG: 10 TABLET, FILM COATED ORAL at 10:07

## 2018-07-09 RX ADMIN — TRAMADOL HYDROCHLORIDE 50 MG: 50 TABLET, FILM COATED ORAL at 10:07

## 2018-07-10 NOTE — ED PROVIDER NOTES
Encounter Date: 2018  47 y.o. female with nontraumatic right shoulder and hip pain. No fever. Patient will be seen by another provider for further evaluation when an exam room is available. Jesse LOPEZ, 8:53 PM       History     Chief Complaint   Patient presents with    Hip Pain     Patient reports right hip and right shoulder pain x 5 days. Denies falls or injuries.    Shoulder Pain     Patient presents complaining of right shoulder pain and bilateral hip pain right greater than left.  Denies any trauma or known injury. States she has had pain in these areas in the past.  Last was approximately 1 year ago.  He denies any redness or swelling to the joints.  Patient able to ambulate and raise her right arm.  States right shoulder pain worse at night with laying on the right side.  Denies any neck pain, back pain, chest pain, abdominal pain. States she has been taking naproxen with some improvement.  States joint pains are worse in the morning when 1st waking or after sitting for extended period of time.  The          Review of patient's allergies indicates:   Allergen Reactions    Sulfa (sulfonamide antibiotics) Hives and Swelling     Past Medical History:   Diagnosis Date    Fatty liver     Gall stones 2015    GERD (gastroesophageal reflux disease)     Hypertension      Past Surgical History:   Procedure Laterality Date     SECTION, LOW TRANSVERSE      HYSTERECTOMY      TUMOR REMOVAL       Family History   Problem Relation Age of Onset    Breast cancer Neg Hx     Colon cancer Neg Hx     Ovarian cancer Neg Hx      Social History   Substance Use Topics    Smoking status: Never Smoker    Smokeless tobacco: Never Used    Alcohol use No     Review of Systems   Constitutional: Negative for fever.   HENT: Negative for sore throat.    Eyes: Negative for visual disturbance.   Respiratory: Negative for shortness of breath.    Cardiovascular: Negative for chest pain.   Gastrointestinal:  Negative for abdominal pain.   Genitourinary: Negative for difficulty urinating.   Musculoskeletal: Positive for arthralgias. Negative for back pain and joint swelling.   Skin: Negative for rash.   Neurological: Negative for headaches.       Physical Exam     Initial Vitals [07/09/18 2051]   BP Pulse Resp Temp SpO2   (!) 167/72 84 18 98.8 °F (37.1 °C) 100 %      MAP       --         Physical Exam    Nursing note and vitals reviewed.  Constitutional: She appears well-developed and well-nourished.   Eyes: EOM are normal. Pupils are equal, round, and reactive to light.   Neck: Normal range of motion. Neck supple. No thyromegaly present. No JVD present.   Cardiovascular: Normal rate, regular rhythm, normal heart sounds and intact distal pulses. Exam reveals no gallop and no friction rub.    No murmur heard.  Pulmonary/Chest: Breath sounds normal. No respiratory distress.   Abdominal: Soft. Bowel sounds are normal.   Musculoskeletal: Normal range of motion. She exhibits no edema or tenderness.   Full range of motion of the shoulder.  There is some tenderness to the right rotator cuff region but no significant pain with movements of the rotator cuff.  Normal gait.   Neurological: She is alert and oriented to person, place, and time. She has normal strength. She displays normal reflexes. No cranial nerve deficit or sensory deficit.   Skin: Skin is warm and dry.         ED Course   Procedures  Labs Reviewed - No data to display       Imaging Results    None         likely arthritis or other mild inflammatory condition.  No clinical evidence of septic arthritis, gout or other severe polyarthralgia.  Patient has no known history of lupus.  I saw this patient 1 month ago for an acute gastritis.  Patient has follow-up GI August 31st.  I want to limit the patient's nonsteroidal anti-inflammatory consumption.  Do not want treat the patient with steroids due to still would problems.  Will treat with tramadol and Flexeril and  orthopedic follow-up.                       Clinical Impression:   The primary encounter diagnosis was Acute pain of right shoulder. A diagnosis of Bilateral hip pain was also pertinent to this visit.                             Blue Flores MD  07/09/18 3426

## 2018-09-23 ENCOUNTER — HOSPITAL ENCOUNTER (EMERGENCY)
Facility: HOSPITAL | Age: 48
Discharge: HOME | End: 2018-09-23
Attending: EMERGENCY MEDICINE
Payer: MEDICAID

## 2018-09-23 VITALS
BODY MASS INDEX: 32.1 KG/M2 | DIASTOLIC BLOOD PRESSURE: 74 MMHG | SYSTOLIC BLOOD PRESSURE: 112 MMHG | TEMPERATURE: 98 F | WEIGHT: 170 LBS | HEIGHT: 61 IN | HEART RATE: 77 BPM | OXYGEN SATURATION: 100 % | RESPIRATION RATE: 20 BRPM

## 2018-09-23 DIAGNOSIS — R10.13 EPIGASTRIC PAIN: Primary | ICD-10-CM

## 2018-09-23 LAB
ALBUMIN SERPL BCP-MCNC: 2.7 G/DL
ALP SERPL-CCNC: 73 U/L
ALT SERPL W/O P-5'-P-CCNC: 21 U/L
ANION GAP SERPL CALC-SCNC: 6 MMOL/L
AST SERPL-CCNC: 13 U/L
BASOPHILS # BLD AUTO: 0.02 K/UL
BASOPHILS NFR BLD: 0.3 %
BILIRUB SERPL-MCNC: 1 MG/DL
BILIRUB UR QL STRIP: NEGATIVE
BUN SERPL-MCNC: 15 MG/DL
CALCIUM SERPL-MCNC: 8.4 MG/DL
CHLORIDE SERPL-SCNC: 102 MMOL/L
CLARITY UR: CLEAR
CO2 SERPL-SCNC: 25 MMOL/L
COLOR UR: COLORLESS
CREAT SERPL-MCNC: 1.2 MG/DL
DIFFERENTIAL METHOD: ABNORMAL
EOSINOPHIL # BLD AUTO: 0.1 K/UL
EOSINOPHIL NFR BLD: 0.8 %
ERYTHROCYTE [DISTWIDTH] IN BLOOD BY AUTOMATED COUNT: 13.2 %
EST. GFR  (AFRICAN AMERICAN): >60 ML/MIN/1.73 M^2
EST. GFR  (NON AFRICAN AMERICAN): 54 ML/MIN/1.73 M^2
GLUCOSE SERPL-MCNC: 137 MG/DL
GLUCOSE UR QL STRIP: NEGATIVE
HCT VFR BLD AUTO: 41.1 %
HGB BLD-MCNC: 14.3 G/DL
HGB UR QL STRIP: NEGATIVE
KETONES UR QL STRIP: NEGATIVE
LEUKOCYTE ESTERASE UR QL STRIP: NEGATIVE
LIPASE SERPL-CCNC: 42 U/L
LYMPHOCYTES # BLD AUTO: 1.8 K/UL
LYMPHOCYTES NFR BLD: 23.1 %
MCH RBC QN AUTO: 28.1 PG
MCHC RBC AUTO-ENTMCNC: 34.8 G/DL
MCV RBC AUTO: 81 FL
MONOCYTES # BLD AUTO: 0.4 K/UL
MONOCYTES NFR BLD: 5.1 %
NEUTROPHILS # BLD AUTO: 5.4 K/UL
NEUTROPHILS NFR BLD: 70.6 %
NITRITE UR QL STRIP: NEGATIVE
PH UR STRIP: 7 [PH] (ref 5–8)
PLATELET # BLD AUTO: 252 K/UL
PMV BLD AUTO: 10.4 FL
POTASSIUM SERPL-SCNC: 4.2 MMOL/L
PROT SERPL-MCNC: 5.4 G/DL
PROT UR QL STRIP: NEGATIVE
RBC # BLD AUTO: 5.09 M/UL
SODIUM SERPL-SCNC: 133 MMOL/L
SP GR UR STRIP: 1 (ref 1–1.03)
TROPONIN I SERPL DL<=0.01 NG/ML-MCNC: 0.01 NG/ML
TROPONIN I SERPL DL<=0.01 NG/ML-MCNC: <0.006 NG/ML
URN SPEC COLLECT METH UR: ABNORMAL
UROBILINOGEN UR STRIP-ACNC: NEGATIVE EU/DL
WBC # BLD AUTO: 7.66 K/UL

## 2018-09-23 PROCEDURE — 93010 ELECTROCARDIOGRAM REPORT: CPT | Mod: ,,, | Performed by: INTERNAL MEDICINE

## 2018-09-23 PROCEDURE — 96361 HYDRATE IV INFUSION ADD-ON: CPT

## 2018-09-23 PROCEDURE — 96374 THER/PROPH/DIAG INJ IV PUSH: CPT

## 2018-09-23 PROCEDURE — 84484 ASSAY OF TROPONIN QUANT: CPT | Mod: 91

## 2018-09-23 PROCEDURE — 81003 URINALYSIS AUTO W/O SCOPE: CPT

## 2018-09-23 PROCEDURE — 85025 COMPLETE CBC W/AUTO DIFF WBC: CPT

## 2018-09-23 PROCEDURE — 84484 ASSAY OF TROPONIN QUANT: CPT

## 2018-09-23 PROCEDURE — 63600175 PHARM REV CODE 636 W HCPCS: Performed by: PHYSICIAN ASSISTANT

## 2018-09-23 PROCEDURE — C9113 INJ PANTOPRAZOLE SODIUM, VIA: HCPCS | Performed by: PHYSICIAN ASSISTANT

## 2018-09-23 PROCEDURE — 99284 EMERGENCY DEPT VISIT MOD MDM: CPT | Mod: 25

## 2018-09-23 PROCEDURE — 80053 COMPREHEN METABOLIC PANEL: CPT

## 2018-09-23 PROCEDURE — 83690 ASSAY OF LIPASE: CPT

## 2018-09-23 PROCEDURE — 93005 ELECTROCARDIOGRAM TRACING: CPT

## 2018-09-23 PROCEDURE — 25000003 PHARM REV CODE 250: Performed by: PHYSICIAN ASSISTANT

## 2018-09-23 PROCEDURE — 25000003 PHARM REV CODE 250: Performed by: NURSE PRACTITIONER

## 2018-09-23 RX ORDER — SUCRALFATE 1 G/10ML
1 SUSPENSION ORAL 2 TIMES DAILY
Qty: 140 ML | Refills: 0 | Status: SHIPPED | OUTPATIENT
Start: 2018-09-23 | End: 2018-09-30

## 2018-09-23 RX ORDER — PANTOPRAZOLE SODIUM 40 MG/10ML
40 INJECTION, POWDER, LYOPHILIZED, FOR SOLUTION INTRAVENOUS
Status: COMPLETED | OUTPATIENT
Start: 2018-09-23 | End: 2018-09-23

## 2018-09-23 RX ADMIN — PANTOPRAZOLE SODIUM 40 MG: 40 INJECTION, POWDER, LYOPHILIZED, FOR SOLUTION INTRAVENOUS at 10:09

## 2018-09-23 RX ADMIN — LIDOCAINE HYDROCHLORIDE: 20 SOLUTION ORAL; TOPICAL at 10:09

## 2018-09-23 RX ADMIN — SODIUM CHLORIDE 1000 ML: 0.9 INJECTION, SOLUTION INTRAVENOUS at 11:09

## 2018-09-23 NOTE — ED PROVIDER NOTES
Encounter Date: 2018    This is a SORT/MSE of a 47 y.o. female presenting to the ED with c/o epigastric abdominal pain with 2 episodes of vomiting that began today. Care will be transferred to an alternate provider when patient is roomed for a full evaluation and final disposition. JACOB Beltran, SIMP-EARNEST 2018 10:41 AM       History     Chief Complaint   Patient presents with    Abdominal Pain     epigastric pain that began around 5 am today; also reports x2 episodes of emesis; denies diarrhea/fever; no OTC meds taken     47-year-old female with past medical history gastritis, fatty liver, cholelithiasis, GERD, hypertension, presents to ED complaining of acute onset midepigastric abdominal pain since 5:00 a.m. this morning.  She admits to nausea with 2 episodes nonbloody, nonbilious emesis.  Denies fever.  She does endorse strong odor to urine this week, however no dysuria, no increased urinary frequency, no flank pain. Hx , hysterectomy.  Patient states this has occurred in the past.  Most recently on Thursday of this week, spontaneously resolved without treatment.  She states she has been evaluated by a surgeon due to gallstones.  He apparently gave her the option of surgery, however she has not followed up.  She states she has also been evaluated by a gastroenterologist at [facility].  She states she has not followed up.  Not worse when supine.  Denies burning sensation in throat or chest.  Denies chest pain, denies shortness of breath, denies dyspnea on exertion.  She admits to a soreness to midepigastric region.  No alleviating or exacerbating factors.  No radiation of symptoms.  He does admit to small volume stools over the past week.  No change in appetite or p.o. intake.          Review of patient's allergies indicates:   Allergen Reactions    Sulfa (sulfonamide antibiotics) Hives and Swelling     Past Medical History:   Diagnosis Date    Fatty liver     Gall stones 2015    GERD  (gastroesophageal reflux disease)     Hypertension      Past Surgical History:   Procedure Laterality Date     SECTION, LOW TRANSVERSE      HYSTERECTOMY      TUMOR REMOVAL       Family History   Problem Relation Age of Onset    Breast cancer Neg Hx     Colon cancer Neg Hx     Ovarian cancer Neg Hx      Social History     Tobacco Use    Smoking status: Never Smoker    Smokeless tobacco: Never Used   Substance Use Topics    Alcohol use: No    Drug use: No     Review of Systems   Constitutional: Negative for chills and fever.   HENT: Negative for sore throat.    Eyes: Negative.    Respiratory: Negative for shortness of breath.    Cardiovascular: Negative for chest pain.   Gastrointestinal: Positive for abdominal pain, nausea and vomiting. Negative for constipation and diarrhea.   Endocrine: Negative.    Genitourinary: Negative for difficulty urinating, dysuria, flank pain, frequency, hematuria, vaginal bleeding, vaginal discharge and vaginal pain.        Strong odor to urine   Musculoskeletal: Negative for back pain, myalgias, neck pain and neck stiffness.   Skin: Negative for rash.   Neurological: Negative for dizziness, syncope, weakness, light-headedness and headaches.   Hematological: Does not bruise/bleed easily.   Psychiatric/Behavioral: Negative.    All other systems reviewed and are negative.      Physical Exam     Initial Vitals [18 1038]   BP Pulse Resp Temp SpO2   128/74 95 17 98.8 °F (37.1 °C) 100 %      MAP       --         Physical Exam    Nursing note and vitals reviewed.  Constitutional: She appears well-developed and well-nourished. She is not diaphoretic. No distress.   Overall well-appearing, nontoxic.  Resting comfortably on exam table.   HENT:   Head: Normocephalic and atraumatic.   Eyes: Conjunctivae and EOM are normal. Pupils are equal, round, and reactive to light.   Neck: Normal range of motion. Neck supple. No tracheal deviation present.   Cardiovascular: Intact distal  pulses.   Pulmonary/Chest: Breath sounds normal. No stridor. No respiratory distress. She has no wheezes.   Abdominal:   Abdomen overall soft, normal bowel sounds ×4.  TTP midepigastric, RUQ. No rebound or guarding.  No palpable mass or distention.  No flank or CVA tenderness.  Negative Jay sign.  No pain over McBurney's point.   Musculoskeletal: Normal range of motion. She exhibits no tenderness.   Lymphadenopathy:     She has no cervical adenopathy.   Neurological: She is alert and oriented to person, place, and time.   Skin: Skin is warm and dry. Capillary refill takes less than 2 seconds.   Psychiatric: She has a normal mood and affect. Her behavior is normal. Judgment and thought content normal.         ED Course   Procedures  Labs Reviewed   CBC W/ AUTO DIFFERENTIAL - Abnormal; Notable for the following components:       Result Value    MCV 81 (*)     All other components within normal limits   COMPREHENSIVE METABOLIC PANEL - Abnormal; Notable for the following components:    Sodium 133 (*)     Glucose 137 (*)     Calcium 8.4 (*)     Total Protein 5.4 (*)     Albumin 2.7 (*)     Anion Gap 6 (*)     eGFR if non  54 (*)     All other components within normal limits   URINALYSIS, REFLEX TO URINE CULTURE - Abnormal; Notable for the following components:    Color, UA Colorless (*)     Specific [place], UA 1.000 (*)     All other components within normal limits    Narrative:     Preferred Collection Type->Urine, Clean Catch   LIPASE   TROPONIN I   TROPONIN I     EKG Readings: (Independently Interpreted)   Normal sinus rhythm, ventricular rate 80bpm. No evidence of ischemia, arrhythmia, or heart block. No ST elevation.       Imaging Results    None          Medical Decision Making:   Differential Diagnosis:   Gastritis, GERD, peptic ulcer disease, perforated viscus, cholelithiasis, cholecystitis, pancreatitis, myocardial ischemia  Clinical Tests:   Lab Tests: Ordered and Reviewed  Medical Tests:  Ordered and Reviewed  ED Management:  47-year-old female with history of cholelithiasis, history of GERD and gastritis, with upper abdominal pain since early this morning.  She admits to nausea with nonbloody, nonbilious emesis as well.  Denies fever.  History , hysterectomy, no other abdominal surgeries. Pt states presentation is similar to episodes in the past; states this also occurred Thursday, resolved without treatment. No pulsatile character to pain. No retching back pain. No paresthesia/radiculopathy. 1+ DP bilaterally. No chest pain. Low suspicion for aortic etiology.     On reevaluation, feels better after fluids, GI cocktail. Serial troponins negative. Labs unremarkable. Bilirubin wnl. Low suspicion for gallbladder issue as culprit of presentation. Low suspicion for cardiogenic process. Pt states this is similar to previous presentations of gastric etiology. She should follow-up with GI. Tolerating PO. Return precautions given.                       Clinical Impression:   The encounter diagnosis was Epigastric pain.      Disposition:   Disposition: Discharged  Condition: Stable                        Amador Quesada PA-C  18 1411

## 2018-09-23 NOTE — DISCHARGE INSTRUCTIONS
Continue with Protonix daily.  GERD diet.  Follow-up with Gastroenterology for re-evaluation and further recommendations. Return to this ED if you begin with chest pain, if symptoms persists despite treatment, if any other problems occur.

## 2018-09-23 NOTE — ED TRIAGE NOTES
Pt. Reports abd pain since this morning, reports emesis 2x. Pt reports loose stools. Pt. Denies any fevers/chills. Pain is 8/10. Pt. Reports she was supposed to f/u with [facility] and has not made her appt. Was told to do so in June 2018.

## 2018-10-04 ENCOUNTER — NURSE TRIAGE (OUTPATIENT)
Dept: ADMINISTRATIVE | Facility: CLINIC | Age: 48
End: 2018-10-04

## 2018-10-04 NOTE — TELEPHONE ENCOUNTER
Reason for Disposition   Unusual stool color probably from food or medicine (all triage questions negative)    Protocols used: ST STOOLS - UNUSUAL COLOR-A-AH

## 2018-11-07 DIAGNOSIS — M25.511 RIGHT SHOULDER PAIN: ICD-10-CM

## 2018-11-07 DIAGNOSIS — M25.512 LEFT SHOULDER PAIN: Primary | ICD-10-CM

## 2019-02-10 ENCOUNTER — HOSPITAL ENCOUNTER (OUTPATIENT)
Facility: HOSPITAL | Age: 49
Discharge: HOME | End: 2019-02-12
Attending: EMERGENCY MEDICINE | Admitting: EMERGENCY MEDICINE
Payer: MEDICAID

## 2019-02-10 DIAGNOSIS — R07.9 CHEST PAIN: ICD-10-CM

## 2019-02-10 DIAGNOSIS — R10.11 RIGHT UPPER QUADRANT ABDOMINAL PAIN: ICD-10-CM

## 2019-02-10 DIAGNOSIS — K81.0 ACUTE CHOLECYSTITIS: Primary | ICD-10-CM

## 2019-02-10 DIAGNOSIS — R10.13 EPIGASTRIC ABDOMINAL PAIN: ICD-10-CM

## 2019-02-10 LAB
ALBUMIN SERPL BCP-MCNC: 3.9 G/DL
ALP SERPL-CCNC: 65 U/L
ALT SERPL W/O P-5'-P-CCNC: 27 U/L
ANION GAP SERPL CALC-SCNC: 11 MMOL/L
AST SERPL-CCNC: 28 U/L
BASOPHILS # BLD AUTO: 0.03 K/UL
BASOPHILS NFR BLD: 0.5 %
BILIRUB SERPL-MCNC: 0.6 MG/DL
BILIRUB UR QL STRIP: NEGATIVE
BUN SERPL-MCNC: 9 MG/DL
CALCIUM SERPL-MCNC: 9.8 MG/DL
CHLORIDE SERPL-SCNC: 102 MMOL/L
CLARITY UR: CLEAR
CO2 SERPL-SCNC: 25 MMOL/L
COLOR UR: NORMAL
CREAT SERPL-MCNC: 0.8 MG/DL
DIFFERENTIAL METHOD: ABNORMAL
EOSINOPHIL # BLD AUTO: 0.1 K/UL
EOSINOPHIL NFR BLD: 1.9 %
ERYTHROCYTE [DISTWIDTH] IN BLOOD BY AUTOMATED COUNT: 13.2 %
EST. GFR  (AFRICAN AMERICAN): >60 ML/MIN/1.73 M^2
EST. GFR  (NON AFRICAN AMERICAN): >60 ML/MIN/1.73 M^2
GLUCOSE SERPL-MCNC: 102 MG/DL
GLUCOSE UR QL STRIP: NEGATIVE
HCT VFR BLD AUTO: 41 %
HGB BLD-MCNC: 14 G/DL
HGB UR QL STRIP: NEGATIVE
KETONES UR QL STRIP: NEGATIVE
LEUKOCYTE ESTERASE UR QL STRIP: NEGATIVE
LIPASE SERPL-CCNC: 40 U/L
LYMPHOCYTES # BLD AUTO: 2.3 K/UL
LYMPHOCYTES NFR BLD: 39.8 %
MCH RBC QN AUTO: 28.4 PG
MCHC RBC AUTO-ENTMCNC: 34.1 G/DL
MCV RBC AUTO: 83 FL
MONOCYTES # BLD AUTO: 0.2 K/UL
MONOCYTES NFR BLD: 4.2 %
NEUTROPHILS # BLD AUTO: 3.1 K/UL
NEUTROPHILS NFR BLD: 53.6 %
NITRITE UR QL STRIP: NEGATIVE
PH UR STRIP: 7 [PH] (ref 5–8)
PLATELET # BLD AUTO: 248 K/UL
PMV BLD AUTO: 10.2 FL
POTASSIUM SERPL-SCNC: 3.8 MMOL/L
PROT SERPL-MCNC: 7.5 G/DL
PROT UR QL STRIP: NEGATIVE
RBC # BLD AUTO: 4.93 M/UL
SODIUM SERPL-SCNC: 138 MMOL/L
SP GR UR STRIP: 1.01 (ref 1–1.03)
TROPONIN I SERPL DL<=0.01 NG/ML-MCNC: <0.006 NG/ML
URN SPEC COLLECT METH UR: NORMAL
UROBILINOGEN UR STRIP-ACNC: NEGATIVE EU/DL
WBC # BLD AUTO: 5.7 K/UL

## 2019-02-10 PROCEDURE — G0378 HOSPITAL OBSERVATION PER HR: HCPCS

## 2019-02-10 PROCEDURE — 82272 OCCULT BLD FECES 1-3 TESTS: CPT

## 2019-02-10 PROCEDURE — 83690 ASSAY OF LIPASE: CPT

## 2019-02-10 PROCEDURE — 96374 THER/PROPH/DIAG INJ IV PUSH: CPT

## 2019-02-10 PROCEDURE — 96376 TX/PRO/DX INJ SAME DRUG ADON: CPT

## 2019-02-10 PROCEDURE — 84484 ASSAY OF TROPONIN QUANT: CPT

## 2019-02-10 PROCEDURE — 25000003 PHARM REV CODE 250: Performed by: NURSE PRACTITIONER

## 2019-02-10 PROCEDURE — 93010 ELECTROCARDIOGRAM REPORT: CPT | Mod: ,,, | Performed by: INTERNAL MEDICINE

## 2019-02-10 PROCEDURE — 93005 ELECTROCARDIOGRAM TRACING: CPT

## 2019-02-10 PROCEDURE — 99285 EMERGENCY DEPT VISIT HI MDM: CPT | Mod: 25

## 2019-02-10 PROCEDURE — 80053 COMPREHEN METABOLIC PANEL: CPT

## 2019-02-10 PROCEDURE — 25000003 PHARM REV CODE 250: Performed by: STUDENT IN AN ORGANIZED HEALTH CARE EDUCATION/TRAINING PROGRAM

## 2019-02-10 PROCEDURE — 96375 TX/PRO/DX INJ NEW DRUG ADDON: CPT

## 2019-02-10 PROCEDURE — 81003 URINALYSIS AUTO W/O SCOPE: CPT

## 2019-02-10 PROCEDURE — 63600175 PHARM REV CODE 636 W HCPCS: Performed by: NURSE PRACTITIONER

## 2019-02-10 PROCEDURE — 96361 HYDRATE IV INFUSION ADD-ON: CPT

## 2019-02-10 PROCEDURE — 85025 COMPLETE CBC W/AUTO DIFF WBC: CPT

## 2019-02-10 PROCEDURE — 93010 EKG 12-LEAD: ICD-10-PCS | Mod: ,,, | Performed by: INTERNAL MEDICINE

## 2019-02-10 RX ORDER — MORPHINE SULFATE 10 MG/ML
2 INJECTION INTRAMUSCULAR; INTRAVENOUS; SUBCUTANEOUS EVERY 4 HOURS PRN
Status: DISCONTINUED | OUTPATIENT
Start: 2019-02-10 | End: 2019-02-11

## 2019-02-10 RX ORDER — CIPROFLOXACIN 2 MG/ML
400 INJECTION, SOLUTION INTRAVENOUS
Status: DISCONTINUED | OUTPATIENT
Start: 2019-02-10 | End: 2019-02-10

## 2019-02-10 RX ORDER — PANTOPRAZOLE SODIUM 40 MG/1
40 TABLET, DELAYED RELEASE ORAL 2 TIMES DAILY
Status: DISCONTINUED | OUTPATIENT
Start: 2019-02-10 | End: 2019-02-12 | Stop reason: HOSPADM

## 2019-02-10 RX ORDER — MORPHINE SULFATE 10 MG/ML
8 INJECTION INTRAMUSCULAR; INTRAVENOUS; SUBCUTANEOUS
Status: COMPLETED | OUTPATIENT
Start: 2019-02-10 | End: 2019-02-10

## 2019-02-10 RX ORDER — SODIUM CHLORIDE 9 MG/ML
INJECTION, SOLUTION INTRAVENOUS CONTINUOUS
Status: DISCONTINUED | OUTPATIENT
Start: 2019-02-10 | End: 2019-02-11

## 2019-02-10 RX ORDER — ONDANSETRON 2 MG/ML
4 INJECTION INTRAMUSCULAR; INTRAVENOUS EVERY 8 HOURS PRN
Status: DISCONTINUED | OUTPATIENT
Start: 2019-02-10 | End: 2019-02-12 | Stop reason: HOSPADM

## 2019-02-10 RX ORDER — SODIUM CHLORIDE 0.9 % (FLUSH) 0.9 %
5 SYRINGE (ML) INJECTION
Status: DISCONTINUED | OUTPATIENT
Start: 2019-02-10 | End: 2019-02-12 | Stop reason: HOSPADM

## 2019-02-10 RX ORDER — MORPHINE SULFATE 10 MG/ML
4 INJECTION INTRAMUSCULAR; INTRAVENOUS; SUBCUTANEOUS EVERY 4 HOURS PRN
Status: DISCONTINUED | OUTPATIENT
Start: 2019-02-10 | End: 2019-02-11

## 2019-02-10 RX ADMIN — SODIUM CHLORIDE 1000 ML: 0.9 INJECTION, SOLUTION INTRAVENOUS at 10:02

## 2019-02-10 RX ADMIN — MORPHINE SULFATE 8 MG: 10 INJECTION INTRAVENOUS at 10:02

## 2019-02-10 RX ADMIN — PANTOPRAZOLE SODIUM 40 MG: 40 TABLET, DELAYED RELEASE ORAL at 08:02

## 2019-02-10 RX ADMIN — PIPERACILLIN AND TAZOBACTAM 4.5 G: 4; .5 INJECTION, POWDER, LYOPHILIZED, FOR SOLUTION INTRAVENOUS; PARENTERAL at 12:02

## 2019-02-10 RX ADMIN — SODIUM CHLORIDE: 0.9 INJECTION, SOLUTION INTRAVENOUS at 02:02

## 2019-02-10 RX ADMIN — MORPHINE SULFATE 4 MG: 10 INJECTION INTRAVENOUS at 02:02

## 2019-02-10 RX ADMIN — PIPERACILLIN AND TAZOBACTAM 4.5 G: 4; .5 INJECTION, POWDER, LYOPHILIZED, FOR SOLUTION INTRAVENOUS; PARENTERAL at 08:02

## 2019-02-10 NOTE — ED TRIAGE NOTES
Pt c/o RUQ abdominal pain, N/V that started last night. Vomiting x1 episode PTA. Denies dysuria, fever, chills. Pt reports being hospitalized at [facility] last year with same pain. Pain is 10/10. Denies taking pain meds PTA

## 2019-02-10 NOTE — PLAN OF CARE
Problem: Adult Inpatient Plan of Care  Goal: Plan of Care Review  Outcome: Ongoing (interventions implemented as appropriate)  New admit during shift. No s/s pf distress noted. C/O pain to abdomen. Medicated as prescribed. IVF infusing as ordered. Pt able to ambulated safely independently. Tolerating full liquid diet well. Safety measures maintained. Call bell within reach. Will continue to monitor

## 2019-02-10 NOTE — H&P
[facility]  History & Physical    Subjective:      Chief Complaint/Reason for Admission:    Kylie Felix is a 48 y.o. female with a long history of intermittent epigastric pain and recent 18 hours of persistent RUQ pain with nausea and radiation of discomfort to her right shoulder. She states she has been told previously she has gallstones and was worked up for H. Pylori at an OSH that was found to be negative. She denies fevers or chills but has had constipation with occasional diarrhea.     Past Medical History:   Diagnosis Date    Fatty liver     Gall stones 2015    GERD (gastroesophageal reflux disease)     Hypertension      Past Surgical History:   Procedure Laterality Date     SECTION, LOW TRANSVERSE      CYST REMOVAL      HYSTERECTOMY      TUMOR REMOVAL       Family History   Problem Relation Age of Onset    Breast cancer Neg Hx     Colon cancer Neg Hx     Ovarian cancer Neg Hx      Social History     Tobacco Use    Smoking status: Never Smoker    Smokeless tobacco: Never Used   Substance Use Topics    Alcohol use: No    Drug use: No         (Not in a hospital admission)  Review of patient's allergies indicates:   Allergen Reactions    Sulfa (sulfonamide antibiotics) Hives and Swelling        ROS  As per HPI   Objective:      Vital Signs (Most Recent)  Temp: 98.7 °F (37.1 °C) (02/10/19 1211)  Pulse: 94 (02/10/19 1211)  Resp: 20 (02/10/19 1211)  BP: 131/85 (02/10/19 1211)  SpO2: 100 % (02/10/19 121)    Vital Signs Range (Last 24H):  Temp:  [98.3 °F (36.8 °C)-98.7 °F (37.1 °C)]   Pulse:  [74-94]   Resp:  [18-20]   BP: (131-194)/()   SpO2:  [99 %-100 %]     Physical Exam    Data Review:    CBC:   Lab Results   Component Value Date    WBC 5.70 02/10/2019    RBC 4.93 02/10/2019    HGB 14.0 02/10/2019    HCT 41.0 02/10/2019     02/10/2019     BMP:   Lab Results   Component Value Date     02/10/2019     02/10/2019    K 3.8 02/10/2019    CL  102 02/10/2019    CO2 25 02/10/2019    BUN 9 02/10/2019    CREATININE 0.8 02/10/2019    CALCIUM 9.8 02/10/2019     Coagulation:   Lab Results   Component Value Date    INR 1.0 09/18/2012    APTT 22.8 09/18/2012       Imaging Results          US Abdomen Limited (Final result)  Result time 02/10/19 11:28:32    Final result by Thomas Kang MD (02/10/19 11:28:32)                 Impression:      Sonographic findings are suggestive of cholecystitis with  thickened gallbladder wall, small amount of pericholecystic fluid and multiple gallstones including 1 lodged in the neck/cystic duct region.  The cystic duct and common bile duct are dilated.  There is also mild prominence of the pancreatic duct.  Given dilation of the CBD and pancreatic duct, possibility of a more distal migrated stone/choledocholithiasis should be considered although this cannot be demonstrated sonographically.      Electronically signed by: Thomas Kang MD  Date:    02/10/2019  Time:    11:28             Narrative:    EXAMINATION:  US ABDOMEN LIMITED    CLINICAL HISTORY:  Abdominal Pain - Gallbladder;    TECHNIQUE:  Limited ultrasound of the right upper quadrant of the abdomen (including pancreas, liver, gallbladder, common bile duct, and right kidney) was performed.    COMPARISON:  CT from 09/18/2012.    FINDINGS:  The pancreatic duct is slightly prominent measuring 2.3 mm.  No focal pancreatic lesion.  The distal body and tail are obscured by overlying bowel gas.  No focal hepatic lesion.  Liver measures 15.2 cm in length.  No intrahepatic biliary dilation is seen.  Right kidney is normal and measures 9.7 cm in length.    Gallbladder wall is thickened at 4.2 mm.  A positive sonographic Jay sign was reported.  Multiple gallstones are seen.  The largest stone measures 1.3 cm maximally.  A stone is seen in the gallbladder neck and cystic duct region.  The cystic duct is prominence.  Common bile duct is dilated measures 7.7 mm.  A small amount of  pericholecystic fluid is seen                                Assessment:      Active Hospital Problems    Diagnosis  POA    Acute cholecystitis [K81.0]  Yes      Resolved Hospital Problems   No resolved problems to display.       Plan:    Ms Felix is a 48 year old female with HTN, GERD and epigastric pain consistent with acute cholecystitis per US findings. The CBD appears dilated on US, however laboratory values are within normal limits.     -Admit to Surgery with IV abx   -Diet followed by NPO at midnight with MIVF  -Home PPI  -Consent obtained for lap alma delia with possible cholangiogram on 2/11    Woody HALEY I

## 2019-02-10 NOTE — ED PROVIDER NOTES
Encounter Date: 2/10/2019       History     Chief Complaint   Patient presents with    Abdominal Pain     pt c/o RUQ pain since last night with vomiting     CC: Abdominal Pain    HPI: Kylie Felix, a 48 y.o. female that presents to the ED for acute onset of right upper quadrant abdominal pain that began yesterday evening at 11:00 p.m..  She reports the pain subsided on its own and then returned again this morning at approximately 5:00 a.m..  She reports the pain is in the right upper quadrant epigastric abdomen.  Greater on the right upper quadrant.  She reports nausea with an episode of vomiting and 9:00 a.m..  She reports constipation over the last several days and had been trying to treat with per injuries.  She reported yesterday will going to the restroom there was yellow and blood in stool.  She reports no other medications or treatment attempted prior to arrival.  Of note she does have a history of gallstones.  She does have a history hypertension however has been out of her medications for approximately 2 months due to a recall and has not followed up with her doctor for refill.          The history is provided by the patient. No  was used.     Review of patient's allergies indicates:   Allergen Reactions    Sulfa (sulfonamide antibiotics) Hives and Swelling     Past Medical History:   Diagnosis Date    Fatty liver     Gall stones 2015    GERD (gastroesophageal reflux disease)     Hypertension      Past Surgical History:   Procedure Laterality Date     SECTION, LOW TRANSVERSE      CYST REMOVAL      HYSTERECTOMY      TUMOR REMOVAL       Family History   Problem Relation Age of Onset    Breast cancer Neg Hx     Colon cancer Neg Hx     Ovarian cancer Neg Hx      Social History     Tobacco Use    Smoking status: Never Smoker    Smokeless tobacco: Never Used   Substance Use Topics    Alcohol use: No    Drug use: No     Review of Systems   Constitutional: Negative  for fever.   HENT: Negative for trouble swallowing.    Respiratory: Negative for chest tightness and shortness of breath.    Cardiovascular: Negative for chest pain.   Gastrointestinal: Positive for abdominal pain, constipation, nausea and vomiting.   Genitourinary: Negative for dysuria.   Musculoskeletal: Negative for back pain and neck pain.   Skin: Negative for rash and wound.   Neurological: Negative for syncope.   Psychiatric/Behavioral: Negative for confusion.       Physical Exam     Initial Vitals [02/10/19 0934]   BP Pulse Resp Temp SpO2   (!) 194/116 92 18 98.4 °F (36.9 °C) 100 %      MAP       --         Physical Exam    Nursing note and vitals reviewed.  Constitutional: She appears well-developed and well-nourished. She is not diaphoretic. She is cooperative.  Non-toxic appearance. She does not have a sickly appearance. She does not appear ill.   Appears uncomfortable, holding her right upper quadrant while sitting on the exam table.   HENT:   Head: Normocephalic and atraumatic.   Right Ear: External ear normal.   Left Ear: External ear normal.   Eyes: Conjunctivae and EOM are normal.   Neck: Normal range of motion and phonation normal. No tracheal deviation present.   Cardiovascular: Normal rate, regular rhythm and normal heart sounds. Exam reveals no gallop and no friction rub.    No murmur heard.  Pulses:       Radial pulses are 2+ on the right side, and 2+ on the left side.   Pulmonary/Chest: Effort normal and breath sounds normal. No stridor. No tachypnea and no bradypnea. No respiratory distress. She has no wheezes. She has no rhonchi. She has no rales. She exhibits no tenderness.   Abdominal: Soft. She exhibits no distension and no mass. There is tenderness in the right upper quadrant and epigastric area. There is no rigidity.   Genitourinary: Rectal exam shows internal hemorrhoid. Rectal exam shows no external hemorrhoid and guaiac negative stool. Guaiac negative stool. :  Acceptable.  Genitourinary Comments: Exam chaperoned by: BERTRAND Chauhan.  No external hemorrhoid.  Internal hemorrhoid appreciated. Hemoccult negative.   Musculoskeletal: Normal range of motion.   Neurological: She is alert and oriented to person, place, and time. She has normal strength. No sensory deficit. Coordination normal. GCS eye subscore is 4. GCS verbal subscore is 5. GCS motor subscore is 6.   Skin: Skin is warm, dry and intact. Capillary refill takes less than 2 seconds. No bruising and no rash noted. No cyanosis or erythema. Nails show no clubbing.   Psychiatric: She has a normal mood and affect. Her behavior is normal. Thought content normal.         ED Course   Procedures  Labs Reviewed   CBC W/ AUTO DIFFERENTIAL - Abnormal; Notable for the following components:       Result Value    Mono # 0.2 (*)     All other components within normal limits   COMPREHENSIVE METABOLIC PANEL   LIPASE   URINALYSIS, REFLEX TO URINE CULTURE    Narrative:     Preferred Collection Type->Urine, Clean Catch   TROPONIN I        ECG Results          EKG 12-lead (Final result)  Result time 02/10/19 14:54:30    Final result by Interface, Lab In [facility] (02/10/19 14:54:30)                 Narrative:    Test Reason :     Vent. Rate : 068 BPM     Atrial Rate : 068 BPM     P-R Int : 168 ms          QRS Dur : 076 ms      QT Int : 404 ms       P-R-T Axes : 052 032 040 degrees     QTc Int : 429 ms    Normal sinus rhythm with sinus arrhythmia  Low voltage QRS  Cannot rule out Anterior infarct ,age undetermined  Abnormal ECG  When compared with ECG of 23-SEP-2018 10:43,  No significant change was found  Confirmed by Smooth Bradley MD ([id number]) on 2/10/2019 2:54:26 PM    Referred By: HANNAH   SELF           Confirmed By:Smooth Bradley MD                            Imaging Results          US Abdomen Limited (Final result)  Result time 02/10/19 11:28:32    Final result by Thomas Kang MD (02/10/19 11:28:32)                 Impression:       Sonographic findings are suggestive of cholecystitis with  thickened gallbladder wall, small amount of pericholecystic fluid and multiple gallstones including 1 lodged in the neck/cystic duct region.  The cystic duct and common bile duct are dilated.  There is also mild prominence of the pancreatic duct.  Given dilation of the CBD and pancreatic duct, possibility of a more distal migrated stone/choledocholithiasis should be considered although this cannot be demonstrated sonographically.      Electronically signed by: Thomas Kang MD  Date:    02/10/2019  Time:    11:28             Narrative:    EXAMINATION:  US ABDOMEN LIMITED    CLINICAL HISTORY:  Abdominal Pain - Gallbladder;    TECHNIQUE:  Limited ultrasound of the right upper quadrant of the abdomen (including pancreas, liver, gallbladder, common bile duct, and right kidney) was performed.    COMPARISON:  CT from 09/18/2012.    FINDINGS:  The pancreatic duct is slightly prominent measuring 2.3 mm.  No focal pancreatic lesion.  The distal body and tail are obscured by overlying bowel gas.  No focal hepatic lesion.  Liver measures 15.2 cm in length.  No intrahepatic biliary dilation is seen.  Right kidney is normal and measures 9.7 cm in length.    Gallbladder wall is thickened at 4.2 mm.  A positive sonographic Jay sign was reported.  Multiple gallstones are seen.  The largest stone measures 1.3 cm maximally.  A stone is seen in the gallbladder neck and cystic duct region.  The cystic duct is prominence.  Common bile duct is dilated measures 7.7 mm.  A small amount of pericholecystic fluid is seen                                 Medical Decision Making:   History:   Old Medical Records: I decided to obtain old medical records.  Old Records Summarized: records from another hospital.       <> Summary of Records: Patient seen in the ED on 09/26/2018 with a 2 day admission at [facility].  Diagnosed with symptomatic cholelithiasis, gastroesophageal reflux, epigastric  pain, biliary colic, and PUD.  H pylori testing was negative.       APC / Resident Notes:   This is an evaluation of a 48 y.o. female that presents to the Emergency Department for right upper quadrant and epigastric abdominal pain. Physical Exam shows a non-toxic, afebrile, and uncomfortable however overall well appearing female.  Breath sounds are clear and equal to auscultation. Heart regular rhythm. Her abdomen is soft with tenderness in the right upper and epigastric abdomen, greatest in the right upper quadrant.  Internal hemorrhoid with no occult blood in stool. Vital signs are reassuring. If available, previous records reviewed. RESULTS:  CBC with no leukocytosis.  Normal H&H.  No bandemia.  Normal CMP.  Normal T bili and alk phos.  Urinalysis without infection.  Ultrasound with findings consistent with acute cholecystitis.  There is a thickened gallbladder wall.  Small amount of pericholecystic fluid.    My overall impression is right upper quadrant abdominal pain, epigastric abdominal pain, acute cholecystitis. I considered, but at this time, do not suspect bowel obstruction, bowel perforation, appendicitis, electrolyte disturbance, acute GI bleed.    After review of the patients physical exam, ED testing, and history/symptoms, the patient will be admitted. Gen Sx was paged. Call returned and the case was discussed.  Dr. Clement (Resident for Dr. Calero) who will accept the admission to the Gen Sx Service with recommendations for [facility].  Admit orders will be completed. The diagnosis, treatment and plan for admission were discussed with the patient and understanding was verbalized. All questions or concerns have been addressed. This case was discussed with Dr. Dominguez who is in agreement with my assessment and plan. JACOB Beltran, FNP-C          Attending Attestation:   Physician Attestation Statement for Resident:  As the supervising MD  I agree with the above history. -:   As the supervising MD I agree  with the above PE.    As the supervising MD I agree with the above treatment, course, plan, and disposition.   -: I have staffed the patient with the midlevel provider and was available for consultation in the department. I have guided the treatment plan and agree with the care provided.      Physician Attestation Statement for NP/PA:   I discussed this assessment and plan of this patient with the NP/PA, but I did not personally examine the patient. The face to face encounter was performed by the NP/PA.                  ED Course as of Feb 10 1545   Sun Feb 10, 2019   1049 Patient appears much more comfortable, her abdominal pain is improving.  [AF]   1132 Patient updated on the plan of care and plan to admit.  Will speak with General surgery for admission.  Blood pressure is much improved after pain medication.  [AF]      ED Course User Index  [AF] KIMBERLEE Acosta     Clinical Impression:   The primary encounter diagnosis was Acute cholecystitis. Diagnoses of Epigastric abdominal pain and Right upper quadrant abdominal pain were also pertinent to this visit.      Disposition:   Disposition: Admitted  Condition: Stable                        KIMBERLEE Acosta  02/10/19 1545       Melissa Dominguez MD  02/10/19 [id number]

## 2019-02-11 ENCOUNTER — ANESTHESIA (OUTPATIENT)
Dept: SURGERY | Facility: HOSPITAL | Age: 49
End: 2019-02-11
Payer: MEDICAID

## 2019-02-11 ENCOUNTER — ANESTHESIA EVENT (OUTPATIENT)
Dept: SURGERY | Facility: HOSPITAL | Age: 49
End: 2019-02-11
Payer: MEDICAID

## 2019-02-11 LAB
ALBUMIN SERPL BCP-MCNC: 3.1 G/DL
ALP SERPL-CCNC: 53 U/L
ALT SERPL W/O P-5'-P-CCNC: 35 U/L
ANION GAP SERPL CALC-SCNC: 7 MMOL/L
AST SERPL-CCNC: 40 U/L
BASOPHILS # BLD AUTO: 0.02 K/UL
BASOPHILS NFR BLD: 0.5 %
BILIRUB SERPL-MCNC: 1 MG/DL
BUN SERPL-MCNC: 5 MG/DL
CALCIUM SERPL-MCNC: 8.3 MG/DL
CHLORIDE SERPL-SCNC: 109 MMOL/L
CO2 SERPL-SCNC: 26 MMOL/L
CREAT SERPL-MCNC: 0.7 MG/DL
DIFFERENTIAL METHOD: ABNORMAL
EOSINOPHIL # BLD AUTO: 0.1 K/UL
EOSINOPHIL NFR BLD: 2.4 %
ERYTHROCYTE [DISTWIDTH] IN BLOOD BY AUTOMATED COUNT: 13.3 %
EST. GFR  (AFRICAN AMERICAN): >60 ML/MIN/1.73 M^2
EST. GFR  (NON AFRICAN AMERICAN): >60 ML/MIN/1.73 M^2
GLUCOSE SERPL-MCNC: 83 MG/DL
HCT VFR BLD AUTO: 36.4 %
HGB BLD-MCNC: 12.3 G/DL
LYMPHOCYTES # BLD AUTO: 1.9 K/UL
LYMPHOCYTES NFR BLD: 46.2 %
MCH RBC QN AUTO: 28.3 PG
MCHC RBC AUTO-ENTMCNC: 33.8 G/DL
MCV RBC AUTO: 84 FL
MONOCYTES # BLD AUTO: 0.3 K/UL
MONOCYTES NFR BLD: 6.8 %
NEUTROPHILS # BLD AUTO: 1.8 K/UL
NEUTROPHILS NFR BLD: 44.1 %
PLATELET # BLD AUTO: 221 K/UL
PMV BLD AUTO: 10.7 FL
POTASSIUM SERPL-SCNC: 3.5 MMOL/L
PROT SERPL-MCNC: 5.9 G/DL
RBC # BLD AUTO: 4.34 M/UL
SODIUM SERPL-SCNC: 142 MMOL/L
TROPONIN I SERPL DL<=0.01 NG/ML-MCNC: <0.006 NG/ML
WBC # BLD AUTO: 4.13 K/UL

## 2019-02-11 PROCEDURE — 88304 TISSUE EXAM BY PATHOLOGIST: CPT | Mod: 26,,, | Performed by: PATHOLOGY

## 2019-02-11 PROCEDURE — 88304 TISSUE EXAM BY PATHOLOGIST: CPT | Performed by: PATHOLOGY

## 2019-02-11 PROCEDURE — 63600175 PHARM REV CODE 636 W HCPCS: Performed by: SURGERY

## 2019-02-11 PROCEDURE — 80053 COMPREHEN METABOLIC PANEL: CPT

## 2019-02-11 PROCEDURE — 37000008 HC ANESTHESIA 1ST 15 MINUTES: Performed by: SURGERY

## 2019-02-11 PROCEDURE — 63600175 PHARM REV CODE 636 W HCPCS: Performed by: REGISTERED NURSE

## 2019-02-11 PROCEDURE — 63600175 PHARM REV CODE 636 W HCPCS: Performed by: STUDENT IN AN ORGANIZED HEALTH CARE EDUCATION/TRAINING PROGRAM

## 2019-02-11 PROCEDURE — 63600175 PHARM REV CODE 636 W HCPCS: Performed by: NURSE PRACTITIONER

## 2019-02-11 PROCEDURE — G0378 HOSPITAL OBSERVATION PER HR: HCPCS

## 2019-02-11 PROCEDURE — 36000708 HC OR TIME LEV III 1ST 15 MIN: Performed by: SURGERY

## 2019-02-11 PROCEDURE — 99220 PR INITIAL OBSERVATION CARE,LEVL III: ICD-10-PCS | Mod: 57,,, | Performed by: SURGERY

## 2019-02-11 PROCEDURE — 37000009 HC ANESTHESIA EA ADD 15 MINS: Performed by: SURGERY

## 2019-02-11 PROCEDURE — 25000003 PHARM REV CODE 250: Performed by: STUDENT IN AN ORGANIZED HEALTH CARE EDUCATION/TRAINING PROGRAM

## 2019-02-11 PROCEDURE — 36415 COLL VENOUS BLD VENIPUNCTURE: CPT

## 2019-02-11 PROCEDURE — 93005 ELECTROCARDIOGRAM TRACING: CPT

## 2019-02-11 PROCEDURE — D9220A PRA ANESTHESIA: Mod: CRNA,,, | Performed by: REGISTERED NURSE

## 2019-02-11 PROCEDURE — 36000709 HC OR TIME LEV III EA ADD 15 MIN: Performed by: SURGERY

## 2019-02-11 PROCEDURE — 71000039 HC RECOVERY, EACH ADD'L HOUR: Performed by: SURGERY

## 2019-02-11 PROCEDURE — 99220 PR INITIAL OBSERVATION CARE,LEVL III: CPT | Mod: 57,,, | Performed by: SURGERY

## 2019-02-11 PROCEDURE — 27201423 OPTIME MED/SURG SUP & DEVICES STERILE SUPPLY: Performed by: SURGERY

## 2019-02-11 PROCEDURE — 71000033 HC RECOVERY, INTIAL HOUR: Performed by: SURGERY

## 2019-02-11 PROCEDURE — C9290 INJ, BUPIVACAINE LIPOSOME: HCPCS | Performed by: SURGERY

## 2019-02-11 PROCEDURE — 84484 ASSAY OF TROPONIN QUANT: CPT

## 2019-02-11 PROCEDURE — 25000003 PHARM REV CODE 250: Performed by: REGISTERED NURSE

## 2019-02-11 PROCEDURE — 25000003 PHARM REV CODE 250: Performed by: SURGERY

## 2019-02-11 PROCEDURE — 88304 TISSUE SPECIMEN TO PATHOLOGY - SURGERY: ICD-10-PCS | Mod: 26,,, | Performed by: PATHOLOGY

## 2019-02-11 PROCEDURE — D9220A PRA ANESTHESIA: Mod: ANES,,, | Performed by: ANESTHESIOLOGY

## 2019-02-11 PROCEDURE — D9220A PRA ANESTHESIA: ICD-10-PCS | Mod: ANES,,, | Performed by: ANESTHESIOLOGY

## 2019-02-11 PROCEDURE — 85025 COMPLETE CBC W/AUTO DIFF WBC: CPT

## 2019-02-11 PROCEDURE — 93010 ELECTROCARDIOGRAM REPORT: CPT | Mod: ,,, | Performed by: INTERNAL MEDICINE

## 2019-02-11 PROCEDURE — 00790 ANES IPER UPR ABD NOS: CPT | Performed by: SURGERY

## 2019-02-11 PROCEDURE — 47562 LAPAROSCOPIC CHOLECYSTECTOMY: CPT | Mod: ,,, | Performed by: SURGERY

## 2019-02-11 PROCEDURE — 25000003 PHARM REV CODE 250: Performed by: NURSE PRACTITIONER

## 2019-02-11 PROCEDURE — D9220A PRA ANESTHESIA: ICD-10-PCS | Mod: CRNA,,, | Performed by: REGISTERED NURSE

## 2019-02-11 PROCEDURE — 47562 PR LAP,CHOLECYSTECTOMY: ICD-10-PCS | Mod: ,,, | Performed by: SURGERY

## 2019-02-11 PROCEDURE — 93010 EKG 12-LEAD: ICD-10-PCS | Mod: ,,, | Performed by: INTERNAL MEDICINE

## 2019-02-11 PROCEDURE — 63600175 PHARM REV CODE 636 W HCPCS: Performed by: ANESTHESIOLOGY

## 2019-02-11 RX ORDER — LIDOCAINE HCL/PF 100 MG/5ML
SYRINGE (ML) INTRAVENOUS
Status: DISCONTINUED | OUTPATIENT
Start: 2019-02-11 | End: 2019-02-11

## 2019-02-11 RX ORDER — POTASSIUM CHLORIDE 20 MEQ/1
40 TABLET, EXTENDED RELEASE ORAL ONCE
Status: COMPLETED | OUTPATIENT
Start: 2019-02-11 | End: 2019-02-11

## 2019-02-11 RX ORDER — BUPIVACAINE HYDROCHLORIDE 2.5 MG/ML
INJECTION, SOLUTION EPIDURAL; INFILTRATION; INTRACAUDAL
Status: DISCONTINUED | OUTPATIENT
Start: 2019-02-11 | End: 2019-02-11 | Stop reason: HOSPADM

## 2019-02-11 RX ORDER — ACETAMINOPHEN 10 MG/ML
INJECTION, SOLUTION INTRAVENOUS
Status: DISCONTINUED | OUTPATIENT
Start: 2019-02-11 | End: 2019-02-11

## 2019-02-11 RX ORDER — NEOSTIGMINE METHYLSULFATE 1 MG/ML
INJECTION, SOLUTION INTRAVENOUS
Status: DISCONTINUED | OUTPATIENT
Start: 2019-02-11 | End: 2019-02-11

## 2019-02-11 RX ORDER — OXYCODONE AND ACETAMINOPHEN 5; 325 MG/1; MG/1
1 TABLET ORAL EVERY 4 HOURS PRN
Qty: 35 TABLET | Refills: 0 | Status: SHIPPED | OUTPATIENT
Start: 2019-02-11 | End: 2019-06-12

## 2019-02-11 RX ORDER — SODIUM CHLORIDE, SODIUM LACTATE, POTASSIUM CHLORIDE, CALCIUM CHLORIDE 600; 310; 30; 20 MG/100ML; MG/100ML; MG/100ML; MG/100ML
INJECTION, SOLUTION INTRAVENOUS CONTINUOUS PRN
Status: DISCONTINUED | OUTPATIENT
Start: 2019-02-11 | End: 2019-02-11

## 2019-02-11 RX ORDER — OXYCODONE AND ACETAMINOPHEN 5; 325 MG/1; MG/1
1 TABLET ORAL EVERY 6 HOURS PRN
Status: DISCONTINUED | OUTPATIENT
Start: 2019-02-11 | End: 2019-02-12 | Stop reason: HOSPADM

## 2019-02-11 RX ORDER — FENTANYL CITRATE 50 UG/ML
INJECTION, SOLUTION INTRAMUSCULAR; INTRAVENOUS
Status: DISCONTINUED | OUTPATIENT
Start: 2019-02-11 | End: 2019-02-11

## 2019-02-11 RX ORDER — PROPOFOL 10 MG/ML
VIAL (ML) INTRAVENOUS
Status: DISCONTINUED | OUTPATIENT
Start: 2019-02-11 | End: 2019-02-11

## 2019-02-11 RX ORDER — HYDROMORPHONE HYDROCHLORIDE 2 MG/ML
0.2 INJECTION, SOLUTION INTRAMUSCULAR; INTRAVENOUS; SUBCUTANEOUS EVERY 5 MIN PRN
Status: DISCONTINUED | OUTPATIENT
Start: 2019-02-11 | End: 2019-02-11

## 2019-02-11 RX ORDER — ROCURONIUM BROMIDE 10 MG/ML
INJECTION, SOLUTION INTRAVENOUS
Status: DISCONTINUED | OUTPATIENT
Start: 2019-02-11 | End: 2019-02-11

## 2019-02-11 RX ORDER — ONDANSETRON 2 MG/ML
4 INJECTION INTRAMUSCULAR; INTRAVENOUS DAILY PRN
Status: DISCONTINUED | OUTPATIENT
Start: 2019-02-11 | End: 2019-02-12 | Stop reason: HOSPADM

## 2019-02-11 RX ORDER — SODIUM CHLORIDE 0.9 % (FLUSH) 0.9 %
3 SYRINGE (ML) INJECTION
Status: DISCONTINUED | OUTPATIENT
Start: 2019-02-11 | End: 2019-02-12 | Stop reason: HOSPADM

## 2019-02-11 RX ORDER — POTASSIUM CHLORIDE 7.45 MG/ML
10 INJECTION INTRAVENOUS
Status: DISCONTINUED | OUTPATIENT
Start: 2019-02-11 | End: 2019-02-11

## 2019-02-11 RX ORDER — MIDAZOLAM HYDROCHLORIDE 1 MG/ML
INJECTION, SOLUTION INTRAMUSCULAR; INTRAVENOUS
Status: DISCONTINUED | OUTPATIENT
Start: 2019-02-11 | End: 2019-02-11

## 2019-02-11 RX ORDER — GLYCOPYRROLATE 0.2 MG/ML
INJECTION INTRAMUSCULAR; INTRAVENOUS
Status: DISCONTINUED | OUTPATIENT
Start: 2019-02-11 | End: 2019-02-11

## 2019-02-11 RX ADMIN — NEOSTIGMINE METHYLSULFATE 5 MG: 1 INJECTION INTRAVENOUS at 11:02

## 2019-02-11 RX ADMIN — PIPERACILLIN AND TAZOBACTAM 4.5 G: 4; .5 INJECTION, POWDER, LYOPHILIZED, FOR SOLUTION INTRAVENOUS; PARENTERAL at 11:02

## 2019-02-11 RX ADMIN — SODIUM CHLORIDE, SODIUM LACTATE, POTASSIUM CHLORIDE, AND CALCIUM CHLORIDE: .6; .31; .03; .02 INJECTION, SOLUTION INTRAVENOUS at 09:02

## 2019-02-11 RX ADMIN — PROPOFOL 150 MG: 10 INJECTION, EMULSION INTRAVENOUS at 10:02

## 2019-02-11 RX ADMIN — Medication 20 MG: at 11:02

## 2019-02-11 RX ADMIN — HYDROMORPHONE HYDROCHLORIDE 0.2 MG: 2 INJECTION, SOLUTION INTRAMUSCULAR; INTRAVENOUS; SUBCUTANEOUS at 12:02

## 2019-02-11 RX ADMIN — PANTOPRAZOLE SODIUM 40 MG: 40 TABLET, DELAYED RELEASE ORAL at 08:02

## 2019-02-11 RX ADMIN — GLYCOPYRROLATE 0.6 MG: 0.2 INJECTION, SOLUTION INTRAMUSCULAR; INTRAVENOUS at 11:02

## 2019-02-11 RX ADMIN — MIDAZOLAM HYDROCHLORIDE 2 MG: 1 INJECTION, SOLUTION INTRAMUSCULAR; INTRAVENOUS at 10:02

## 2019-02-11 RX ADMIN — POTASSIUM CHLORIDE 40 MEQ: 1500 TABLET, EXTENDED RELEASE ORAL at 05:02

## 2019-02-11 RX ADMIN — PIPERACILLIN AND TAZOBACTAM 4.5 G: 4; .5 INJECTION, POWDER, LYOPHILIZED, FOR SOLUTION INTRAVENOUS; PARENTERAL at 06:02

## 2019-02-11 RX ADMIN — ROCURONIUM BROMIDE 30 MG: 10 INJECTION, SOLUTION INTRAVENOUS at 10:02

## 2019-02-11 RX ADMIN — ACETAMINOPHEN 1000 MG: 10 INJECTION, SOLUTION INTRAVENOUS at 11:02

## 2019-02-11 RX ADMIN — LIDOCAINE HYDROCHLORIDE 80 MG: 20 INJECTION, SOLUTION INTRAVENOUS at 10:02

## 2019-02-11 RX ADMIN — MORPHINE SULFATE 2 MG: 10 INJECTION INTRAVENOUS at 05:02

## 2019-02-11 RX ADMIN — OXYCODONE HYDROCHLORIDE AND ACETAMINOPHEN 1 TABLET: 5; 325 TABLET ORAL at 09:02

## 2019-02-11 RX ADMIN — POTASSIUM CHLORIDE 10 MEQ: 7.46 INJECTION, SOLUTION INTRAVENOUS at 12:02

## 2019-02-11 RX ADMIN — FENTANYL CITRATE 100 MCG: 50 INJECTION INTRAMUSCULAR; INTRAVENOUS at 10:02

## 2019-02-11 NOTE — PLAN OF CARE
02/11/19 0950   Discharge Assessment   Assessment Type Discharge Planning Assessment   Patient/Family in Agreement with Plan other (see comments)  (TN aatempted d/c needs assessment but patient was off the med surg unit)

## 2019-02-11 NOTE — PROGRESS NOTES
TN awaiting for patient to return to med surg unit from surgery/recoovery to implement the discharge planning process..Kacy Puri RN, BSN, [facility]  2/11/2019    TN spoke with med surg nurse, Etelvinaand she said she has not received report from recovery yet..Kacy Puri RN, BSN, [facility]  2/11/2019

## 2019-02-11 NOTE — ANESTHESIA PREPROCEDURE EVALUATION
02/11/2019  Kylie Felix is a 48 y.o., female.    Anesthesia Evaluation    I have reviewed the Patient Summary Reports.    I have reviewed the Nursing Notes.      Review of Systems  Anesthesia Hx:  Denies Hx of Anesthetic complications Per patient, had to remain intubated after hysterectomy, but not sure why.    Social:  Non-Smoker, No Alcohol Use    Hematology/Oncology:  Hematology Normal   Oncology Normal     Cardiovascular:   Exercise tolerance: good Hypertension Past MI  Was told she had a small heart attack about 10 years ago; no issues since; negative tropnins this hospital admission   Pulmonary:   Denies Shortness of breath. Sleep Apnea Was on CPAP intermittently a few years ago; not using it now   Hepatic/GI:   GERD, well controlled Liver Disease, No current nausea; last emesis >24 hours ago   Musculoskeletal:   C/o of bilateral shoulders pain   Neurological:  Neurology Normal        Physical Exam  General:  Well nourished    Airway/Jaw/Neck:  Airway Findings: Mouth Opening: Normal Mallampati: II  TM Distance: Normal, at least 6 cm  Jaw/Neck Findings:  Neck ROM: Normal ROM     Eyes/Ears/Nose:  Eyes/Ears/Nose Findings:    Dental:  Dental Findings: In tact   Chest/Lungs:  Chest/Lungs Findings: Clear to auscultation     Heart/Vascular:  Heart Findings: Rate: Normal  Rhythm: Regular Rhythm        Mental Status:  Mental Status Findings:  Cooperative, Alert and Oriented       Wt Readings from Last 3 Encounters:   02/10/19 77.1 kg (169 lb 16 oz)   09/23/18 77.1 kg (170 lb)   07/09/18 76.7 kg (169 lb)     Temp Readings from Last 3 Encounters:   02/11/19 36.7 °C (98 °F) (Oral)   09/23/18 36.8 °C (98.3 °F) (Oral)   07/09/18 36.8 °C (98.2 °F) (Oral)     BP Readings from Last 3 Encounters:   02/11/19 117/63   09/23/18 112/74   07/09/18 (!) 145/79     Pulse Readings from Last 3 Encounters:   02/11/19 78    09/23/18 77   07/09/18 84     Lab Results   Component Value Date    WBC 4.13 02/11/2019    HGB 12.3 02/11/2019    HCT 36.4 (L) 02/11/2019    MCV 84 02/11/2019     02/11/2019     CMP  Sodium   Date Value Ref Range Status   02/11/2019 142 136 - 145 mmol/L Final     Potassium   Date Value Ref Range Status   02/11/2019 3.5 3.5 - 5.1 mmol/L Final     Chloride   Date Value Ref Range Status   02/11/2019 109 95 - 110 mmol/L Final     CO2   Date Value Ref Range Status   02/11/2019 26 23 - 29 mmol/L Final     Glucose   Date Value Ref Range Status   02/11/2019 83 70 - 110 mg/dL Final     BUN, Bld   Date Value Ref Range Status   02/11/2019 5 (L) 6 - 20 mg/dL Final     Creatinine   Date Value Ref Range Status   02/11/2019 0.7 0.5 - 1.4 mg/dL Final     Calcium   Date Value Ref Range Status   02/11/2019 8.3 (L) 8.7 - 10.5 mg/dL Final     Total Protein   Date Value Ref Range Status   02/11/2019 5.9 (L) 6.0 - 8.4 g/dL Final     Albumin   Date Value Ref Range Status   02/11/2019 3.1 (L) 3.5 - 5.2 g/dL Final     Total Bilirubin   Date Value Ref Range Status   02/11/2019 1.0 0.1 - 1.0 mg/dL Final     Comment:     For infants and newborns, interpretation of results should be based  on gestational age, weight and in agreement with clinical  observations.  Premature Infant recommended reference ranges:  Up to 24 hours.............<8.0 mg/dL  Up to 48 hours............<12.0 mg/dL  3-5 days..................<15.0 mg/dL  6-29 days.................<15.0 mg/dL       Alkaline Phosphatase   Date Value Ref Range Status   02/11/2019 53 (L) 55 - 135 U/L Final     AST   Date Value Ref Range Status   02/11/2019 40 10 - 40 U/L Final     ALT   Date Value Ref Range Status   02/11/2019 35 10 - 44 U/L Final     Anion Gap   Date Value Ref Range Status   02/11/2019 7 (L) 8 - 16 mmol/L Final     eGFR if    Date Value Ref Range Status   02/11/2019 >60 >60 mL/min/1.73 m^2 Final     eGFR if non    Date Value Ref Range  Status   02/11/2019 >60 >60 mL/min/1.73 m^2 Final     Comment:     Calculation used to obtain the estimated glomerular filtration  rate (eGFR) is the CKD-EPI equation.        EKG: reviewed    Anesthesia Plan  Type of Anesthesia, risks & benefits discussed:  Anesthesia Type:  general  Patient's Preference:   Intra-op Monitoring Plan: standard ASA monitors  Intra-op Monitoring Plan Comments:   Post Op Pain Control Plan: multimodal analgesia  Post Op Pain Control Plan Comments:   Induction:   IV  Beta Blocker:  Patient is not currently on a Beta-Blocker (No further documentation required).       Informed Consent: Patient understands risks and agrees with Anesthesia plan.  Questions answered. Anesthesia consent signed with patient.  ASA Score: 2     Day of Surgery Review of History & Physical: I have interviewed and examined the patient. I have reviewed the patient's H&P dated:    H&P update referred to the surgeon.         Ready For Surgery From Anesthesia Perspective.

## 2019-02-11 NOTE — TRANSFER OF CARE
"Anesthesia Transfer of Care Note    Patient: Kylie Felix    Procedure(s) Performed: Procedure(s) (LRB):  CHOLECYSTECTOMY, LAPAROSCOPIC (N/A)    Patient location: PACU    Anesthesia Type: general    Transport from OR: Transported from OR on room air with adequate spontaneous ventilation    Post pain: adequate analgesia    Post assessment: no apparent anesthetic complications and tolerated procedure well    Post vital signs: stable    Level of consciousness: awake and alert    Nausea/Vomiting: no nausea/vomiting    Complications: none    Transfer of care protocol was followed      Last vitals:   Visit Vitals  BP (!) 121/49   Pulse 84   Temp 36.1 °C (97 °F) (Tympanic)   Resp 20   Ht 5' 1" (1.549 m)   Wt 77.1 kg (169 lb 16 oz)   LMP  (LMP Unknown)   SpO2 99%   Breastfeeding? No   BMI 32.12 kg/m²     "

## 2019-02-11 NOTE — ANESTHESIA POSTPROCEDURE EVALUATION
"Anesthesia Post Evaluation    Patient: Kylie Felix    Procedure(s) Performed: Procedure(s) (LRB):  CHOLECYSTECTOMY, LAPAROSCOPIC (N/A)    Final Anesthesia Type: general  Patient location during evaluation: PACU  Patient participation: Yes- Able to Participate  Level of consciousness: awake and alert  Post-procedure vital signs: reviewed and stable  Pain management: adequate  Airway patency: patent  PONV status at discharge: No PONV  Anesthetic complications: no      Cardiovascular status: hemodynamically stable  Respiratory status: spontaneous ventilation  Hydration status: euvolemic  Follow-up not needed.        Visit Vitals  BP (!) 112/53   Pulse 72   Temp 36.1 °C (97 °F) (Tympanic)   Resp 12   Ht 5' 1" (1.549 m)   Wt 77.1 kg (169 lb 16 oz)   LMP  (LMP Unknown)   SpO2 (!) 92%   Breastfeeding? No   BMI 32.12 kg/m²       Pain/Audi Score: Pain Rating Prior to Med Admin: 10 (2/11/2019 12:39 PM)  Pain Rating Post Med Admin: 5 (2/11/2019  1:15 PM)  Audi Score: 9 (2/11/2019  1:15 PM)        "

## 2019-02-11 NOTE — PLAN OF CARE
Problem: Bleeding (Cholecystectomy)  Goal: Absence of Bleeding  Outcome: Ongoing (interventions implemented as appropriate)  Intervention: Monitor and Manage Bleeding   02/11/19 1324   Prevent and Manage Risk of Hemorrhage   Bleeding Management dressing monitored;other (see comments)  (gauze applied to lap site x 1)         Problem: Ongoing Anesthesia Effects (Cholecystectomy)  Goal: Anesthesia/Sedation Recovery  Outcome: Outcome(s) achieved Date Met: 02/11/19  Intervention: Optimize Anesthesia Recovery   02/11/19 1324   Optimize Anesthesia Recovery   Anesthesia/Sedation Recovery progressing toward baseline;criteria met for transfer   Optimize Balance and Safe Activity   Safety Promotion/Fall Prevention pulse ox;side rails raised x 2         Problem: Pain (Cholecystectomy)  Goal: Acceptable Pain Control  Outcome: Ongoing (interventions implemented as appropriate)  Intervention: Prevent or Manage Pain   02/11/19 1324   Prevent or Manage Pain   Pain Management Interventions care clustered;medication offered but refused;quiet environment facilitated         Problem: Postoperative Urinary Retention (Cholecystectomy)  Goal: Effective Urinary Elimination  Outcome: Ongoing (interventions implemented as appropriate)  Intervention: Monitor and Manage Urinary Retention   02/11/19 1324   Promote Bladder Elimination   Urinary Elimination Promotion toileting offered

## 2019-02-11 NOTE — NURSING
Gave report to Stephanie, surg bath done, npo.  Verbalized understanding     Patient going to surgery via stretcher with surg transporter, voided, BLE scds teds  Gown without snaps and IV zosyn infusing. No distress noted.

## 2019-02-11 NOTE — PLAN OF CARE
02/11/19 1757   Discharge Assessment   Assessment Type Discharge Planning Assessment   Confirmed/corrected address and phone number on facesheet? Yes   Assessment information obtained from? Patient   Communicated expected length of stay with patient/caregiver yes   Prior to hospitilization cognitive status: Alert/Oriented   Prior to hospitalization functional status: Independent   Current cognitive status: Alert/Oriented   Current Functional Status: Independent   Facility Arrived From: Home   Lives With parent(s);child(abhilash), adult   Able to Return to Prior Arrangements yes   Is patient able to care for self after discharge? Yes   Who are your caregiver(s) and their phone number(s)? Edinmother: [phone]   Patient's perception of discharge disposition home or selfcare   Readmission Within the Last 30 Days no previous admission in last 30 days   Patient currently being followed by outpatient case management? No   Patient currently receives any other outside agency services? No   Equipment Currently Used at Home none   Do you have any problems affording any of your prescribed medications? TBD   Is the patient taking medications as prescribed? yes   Does the patient have transportation home? Yes   Transportation Anticipated car, drives self;family or friend will provide   Does the patient receive services at the [facility]? No   Discharge Plan A Home with family   Discharge Plan B Other  (TBD)   DME Needed Upon Discharge  other (see comments)  (TBD)   Patient/Family in Agreement with Plan yes   SW Role explained to patient; two patient identifiers recognized; SW contact information placed on Communication board.    Patient reported she is independent at home with mother and daughter; mother assists if needed; no assist needed; no current services; no DME; no discharge needs anticipated at this time.     PCP: [facility]     Extended Emergency Contact Information  Primary Emergency Contact:  Vanessa Felix   [facility]  Home Phone: [phone]  Relation: Mother      [place]: [address] [place], [place]    Payor: MEDICAID / Plan: [place] / Product Type: Managed Medicaid /

## 2019-02-11 NOTE — BRIEF OP NOTE
[facility]  Brief Operative Note    SUMMARY     Surgery Date: 2/11/2019     Surgeon(s) and Role:     * Raphael Ray MD - Primary    Assisting Surgeon: None    Pre-op Diagnosis:  Acute cholecystitis [K81.0]    Post-op Diagnosis:  Post-Op Diagnosis Codes:     * Acute cholecystitis [K81.0]    Procedure(s) (LRB):  CHOLECYSTECTOMY, LAPAROSCOPIC (N/A)    Anesthesia: General    Description of Procedure:lap alma delia  Description of the findings of the procedure: acute cholecystitis  Estimated Blood Loss: 0         Specimens:   Specimen (12h ago, onward)    Start     Ordered    02/11/19 1117  Specimen to Pathology - Surgery  Once     Comments:  Gallbladder     Start Status   02/11/19 1117 Collected (02/11/19 1117)       02/11/19 1117

## 2019-02-11 NOTE — PROGRESS NOTES
[facility]  General Surgery  Progress Note    Subjective:     Interval History: no acute events overnight, notes mild abdominal discomfort    Post-Op Info:  Procedure(s) (LRB):  CHOLECYSTECTOMY, LAPAROSCOPIC (N/A)   Day of Surgery      Medications:  Continuous Infusions:   sodium chloride 0.9% 125 mL/hr at 02/10/19 1433     Scheduled Meds:   pantoprazole  40 mg Oral BID    piperacillin-tazobactam (ZOSYN) IVPB  4.5 g Intravenous Q8H     PRN Meds:morphine, morphine, ondansetron, promethazine (PHENERGAN) IVPB, sodium chloride 0.9%     Objective:     Vital Signs (Most Recent):  Temp: 98 °F (36.7 °C) (02/11/19 0800)  Pulse: 78 (02/11/19 0800)  Resp: 18 (02/11/19 0800)  BP: 117/63 (02/11/19 0800)  SpO2: 99 % (02/11/19 0800) Vital Signs (24h Range):  Temp:  [97.7 °F (36.5 °C)-98.7 °F (37.1 °C)] 98 °F (36.7 °C)  Pulse:  [63-94] 78  Resp:  [16-20] 18  SpO2:  [98 %-100 %] 99 %  BP: (112-194)/() 117/63       Intake/Output Summary (Last 24 hours) at 2/11/2019 0911  Last data filed at 2/11/2019 0608  Gross per 24 hour   Intake 2452.08 ml   Output --   Net 2452.08 ml       Physical Exam  Awake, alert, no distress  RRR  No increased work of breathing  Abdomen soft, mildly tender RUQ, non distended    Significant Labs:  CBC:   Recent Labs   Lab 02/11/19  0410   WBC 4.13   RBC 4.34   HGB 12.3   HCT 36.4*      MCV 84   MCH 28.3   MCHC 33.8     CMP:   Recent Labs   Lab 02/11/19  0410   GLU 83   CALCIUM 8.3*   ALBUMIN 3.1*   PROT 5.9*      K 3.5   CO2 26      BUN 5*   CREATININE 0.7   ALKPHOS 53*   ALT 35   AST 40   BILITOT 1.0     Lipase:   Recent Labs   Lab 02/10/19  0956   LIPASE 40       Significant Diagnostics:  I have reviewed all pertinent imaging results/findings within the past 24 hours.    Assessment/Plan:   48F with cholelithiasis, cholecystitis  OR for lap alma delia  Continue NPO, IVF, ABX    Active Diagnoses:    Diagnosis Date Noted POA    Acute cholecystitis [K81.0] 02/10/2019  Yes      Problems Resolved During this Admission:         Winnie Turner MD  General Surgery  [facility]

## 2019-02-11 NOTE — DISCHARGE SUMMARY
[facility]  Discharge Note  Short Stay    Admit Date: 2/10/2019    Discharge Date and Time: No discharge date for patient encounter.     Attending Physician: Bruno Calero MD     Discharge Provider: Raphael Ray    Diagnoses:  Active Hospital Problems    Diagnosis  POA    *Acute cholecystitis [K81.0]  Yes     Priority: 1 - High      Resolved Hospital Problems   No resolved problems to display.       Discharged Condition: improved    Hospital Course: lap alma delia uncomplicated course  Final Diagnoses: acute cholecystitis    Disposition: Home or Self Care    Follow up/Patient Instructions:    Medications:  Reconciled Home Medications:      Medication List      START taking these medications    oxyCODONE-acetaminophen 5-325 mg per tablet  Commonly known as:  PERCOCET  Take 1 tablet by mouth every 4 (four) hours as needed.        CONTINUE taking these medications    hydroCHLOROthiazide 12.5 MG Tab  Commonly known as:  HYDRODIURIL  Take 12.5 mg by mouth once daily.     pantoprazole 40 MG tablet  Commonly known as:  PROTONIX  Take 1 tablet (40 mg total) by mouth 2 (two) times daily.        STOP taking these medications    amoxicillin-clavulanate 125-31.25 mg/5 mL suspension  Commonly known as:  AUGMENTIN          Discharge Procedure Orders   Diet Adult Regular     Notify your health care provider if you experience any of the following:  temperature >100.4     Notify your health care provider if you experience any of the following:  persistent nausea and vomiting or diarrhea     Notify your health care provider if you experience any of the following:  severe uncontrolled pain     Notify your health care provider if you experience any of the following:  redness, tenderness, or signs of infection (pain, swelling, redness, odor or green/yellow discharge around incision site)     Activity as tolerated     Shower on day dressing removed (No bath)     Follow-up Information     Raphael Ray MD In 1 week.     Specialties:  General Surgery, Vascular Surgery, Thoracic Diseases  Contact information:  [address]  [address]  [place] [address]  [phone]                   Discharge Procedure Orders (must include Diet, Follow-up, Activity):   Discharge Procedure Orders (must include Diet, Follow-up, Activity)   Diet Adult Regular     Notify your health care provider if you experience any of the following:  temperature >100.4     Notify your health care provider if you experience any of the following:  persistent nausea and vomiting or diarrhea     Notify your health care provider if you experience any of the following:  severe uncontrolled pain     Notify your health care provider if you experience any of the following:  redness, tenderness, or signs of infection (pain, swelling, redness, odor or green/yellow discharge around incision site)     Activity as tolerated     Shower on day dressing removed (No bath)

## 2019-02-11 NOTE — PLAN OF CARE
Problem: Adult Inpatient Plan of Care  Goal: Plan of Care Review  Outcome: Ongoing (interventions implemented as appropriate)  Plan of care discussed w/ pt. Pt AAOx4 and able to make needs known.VSSAF.IVFs and ABXs infusing as ordered.Teds in place.Pt NPO after midnight.Surgical bath given for procedure in the am. Pt ambulates independently w/ no new falls/Injuries noted.Pt resting w/ no s/s of distress.Safety measures continued.Call bell placed within reach.Will continue to monitor.

## 2019-02-11 NOTE — PROGRESS NOTES
Follow-up Information     Raphael Ray MD On 2/19/2019.    Specialties:  General Surgery, Vascular Surgery, Thoracic Diseases  Why:  out patient services: 3:00pm follow up from the hospital  Contact information:  [address]  [address] [place] [address] [id number] [phone]

## 2019-02-12 VITALS
SYSTOLIC BLOOD PRESSURE: 111 MMHG | HEART RATE: 72 BPM | OXYGEN SATURATION: 98 % | HEIGHT: 61 IN | BODY MASS INDEX: 32.1 KG/M2 | TEMPERATURE: 98 F | WEIGHT: 170 LBS | RESPIRATION RATE: 18 BRPM | DIASTOLIC BLOOD PRESSURE: 68 MMHG

## 2019-02-12 LAB
ALBUMIN SERPL BCP-MCNC: 3.1 G/DL
ALP SERPL-CCNC: 49 U/L
ALT SERPL W/O P-5'-P-CCNC: 59 U/L
ANION GAP SERPL CALC-SCNC: 6 MMOL/L
AST SERPL-CCNC: 61 U/L
BASOPHILS # BLD AUTO: 0.02 K/UL
BASOPHILS NFR BLD: 0.4 %
BILIRUB SERPL-MCNC: 0.8 MG/DL
BUN SERPL-MCNC: 6 MG/DL
CALCIUM SERPL-MCNC: 8.7 MG/DL
CHLORIDE SERPL-SCNC: 108 MMOL/L
CO2 SERPL-SCNC: 27 MMOL/L
CREAT SERPL-MCNC: 0.7 MG/DL
DIFFERENTIAL METHOD: ABNORMAL
EOSINOPHIL # BLD AUTO: 0.1 K/UL
EOSINOPHIL NFR BLD: 2.1 %
ERYTHROCYTE [DISTWIDTH] IN BLOOD BY AUTOMATED COUNT: 13.5 %
EST. GFR  (AFRICAN AMERICAN): >60 ML/MIN/1.73 M^2
EST. GFR  (NON AFRICAN AMERICAN): >60 ML/MIN/1.73 M^2
GLUCOSE SERPL-MCNC: 81 MG/DL
HCT VFR BLD AUTO: 35.6 %
HGB BLD-MCNC: 11.9 G/DL
LYMPHOCYTES # BLD AUTO: 2.3 K/UL
LYMPHOCYTES NFR BLD: 41.1 %
MCH RBC QN AUTO: 28.2 PG
MCHC RBC AUTO-ENTMCNC: 33.4 G/DL
MCV RBC AUTO: 84 FL
MONOCYTES # BLD AUTO: 0.4 K/UL
MONOCYTES NFR BLD: 6.4 %
NEUTROPHILS # BLD AUTO: 2.8 K/UL
NEUTROPHILS NFR BLD: 50 %
PLATELET # BLD AUTO: 208 K/UL
PMV BLD AUTO: 10.5 FL
POTASSIUM SERPL-SCNC: 3.6 MMOL/L
PROT SERPL-MCNC: 5.9 G/DL
RBC # BLD AUTO: 4.22 M/UL
SODIUM SERPL-SCNC: 141 MMOL/L
WBC # BLD AUTO: 5.65 K/UL

## 2019-02-12 PROCEDURE — 85025 COMPLETE CBC W/AUTO DIFF WBC: CPT

## 2019-02-12 PROCEDURE — 94761 N-INVAS EAR/PLS OXIMETRY MLT: CPT

## 2019-02-12 PROCEDURE — 25000003 PHARM REV CODE 250: Performed by: STUDENT IN AN ORGANIZED HEALTH CARE EDUCATION/TRAINING PROGRAM

## 2019-02-12 PROCEDURE — G0378 HOSPITAL OBSERVATION PER HR: HCPCS

## 2019-02-12 PROCEDURE — 80053 COMPREHEN METABOLIC PANEL: CPT

## 2019-02-12 PROCEDURE — 36415 COLL VENOUS BLD VENIPUNCTURE: CPT

## 2019-02-12 RX ADMIN — OXYCODONE HYDROCHLORIDE AND ACETAMINOPHEN 1 TABLET: 5; 325 TABLET ORAL at 07:02

## 2019-02-12 RX ADMIN — PANTOPRAZOLE SODIUM 40 MG: 40 TABLET, DELAYED RELEASE ORAL at 08:02

## 2019-02-12 NOTE — PLAN OF CARE
02/12/19 1614   Final Note   Assessment Type Final Discharge Note   Anticipated Discharge Disposition Home   What phone number can be called within the next 1-3 days to see how you are doing after discharge? (SEE CHART)   Hospital Follow Up  Appt(s) scheduled? Yes   Discharge plans and expectations educations in teach back method with documentation complete? Yes   Right Care Referral Info   Referral Type NO CARE

## 2019-02-12 NOTE — PROGRESS NOTES
WRITTEN DISCHARGE INFORMATION:  Follow-up Information     Raphael Ray MD On 2/19/2019.    Specialties:  General Surgery, Vascular Surgery, Thoracic Diseases  Why:  out patient services: 3:00pm follow up from the hospital  Contact information:  [address]  [address]  [place] [address]  [phone]             [facility] Carreon On 2/15/2019.    Why:  out patient services: 9:30AM follow upp from the hospital  Contact information:  [address] [place] [address]  [phone]                Things that YOU are responsible for, to HELP YOU, Manage Your Care At Home:  1. Getting your prescriptions filled.  2. Taking you medications as directed. DO NOT MISS ANY DOSES!  3. Going to your follow-up doctor appointments. This is important because it allows the doctor to monitor your progress and to determine if any changes need to be made to your treatment plan.                                                         Help at Home  After discharge for assistance [facility] 24/7 assistance  [phone]   Thank you for choosing [facility] for your care.    Sincerely, Your [facility] Healthcare Manager is,  Kacy Puri RN [facility] [phone]

## 2019-02-12 NOTE — NURSING
"Notified Dr Turner patient is awake alert oriented answering appropriately. Patient is sitting on edge of bed complaining of chest pain, "pressure" on her chest 3-5/10, VS wnl. Verbalized understanding received new orders for EKG and Troponin.   "

## 2019-02-12 NOTE — NURSING
Gave patient and family discharge instructions and prescription. Verbalized understanding. Pt dc home via wheelchair with transporter and family. No distress noted

## 2019-02-12 NOTE — PROGRESS NOTES
[facility]  General Surgery  Progress Note    Subjective:     Interval History: underwent uncomplicated lap alma delia yesterday, remained overnight due to chest pain and feeling drowsy from anesthesia. EKG and trop negative.  Feeling well this am, tolerated diet.     Post-Op Info:  Procedure(s) (LRB):  CHOLECYSTECTOMY, LAPAROSCOPIC (N/A)   1 Day Post-Op      Medications:  Continuous Infusions:  Scheduled Meds:   pantoprazole  40 mg Oral BID     PRN Meds:ondansetron, ondansetron, oxyCODONE-acetaminophen, promethazine (PHENERGAN) IVPB, sodium chloride 0.9%, sodium chloride 0.9%     Objective:     Vital Signs (Most Recent):  Temp: 98.3 °F (36.8 °C) (02/12/19 0810)  Pulse: 72 (02/12/19 0810)  Resp: 18 (02/12/19 0810)  BP: 111/68 (02/12/19 0810)  SpO2: 98 % (02/12/19 0913) Vital Signs (24h Range):  Temp:  [97 °F (36.1 °C)-98.5 °F (36.9 °C)] 98.3 °F (36.8 °C)  Pulse:  [66-94] 72  Resp:  [12-20] 18  SpO2:  [92 %-100 %] 98 %  BP: ()/(47-71) 111/68       Intake/Output Summary (Last 24 hours) at 2/12/2019 1132  Last data filed at 2/11/2019 1730  Gross per 24 hour   Intake 820 ml   Output 11 ml   Net 809 ml       Physical Exam  Awake, alert  RRR  No increased work of breathing  Abdomen with incisions intact, soft, non tender, non distended    Significant Labs:  None    Significant Diagnostics:  None    Assessment/Plan:   48F with cholelithiasis, cholecystitis s/p lap alma delia POD#1  Doing well  Regular diet  PO meds  Ambulate  DC home     Active Diagnoses:    Diagnosis Date Noted POA    PRINCIPAL PROBLEM:  Acute cholecystitis [K81.0] 02/10/2019 Yes      Problems Resolved During this Admission:         Winnie Turner MD  General Surgery  [facility]

## 2019-02-12 NOTE — PROGRESS NOTES
Patient is cleared for discharge from  [facility] informed med surg nurse [facility] via secure chat and phone..Kacy Puri RN, BSN, STN [facility]  2/12/2019

## 2019-02-12 NOTE — NURSING
Notified Dr Turner of EKG and Tropoini test results, patient hesitant in reference to being discharged home. Verbalized understanding ok to hold discharge home, stop all fluids and IV narcotics and start percocet 5/325mg q6prn pain.

## 2019-02-12 NOTE — NURSING
Ambulated with patient around both nurse's station twice. Patient denies SOB, chest pain; has intermittent incisional pain upper right abd. Patient back to room to chair. Tolerated well. Phone and call light in reach, instructed to call if needed. Pending discharge. Verbalized understanding.

## 2019-02-12 NOTE — NURSING
Notified Dr Ziegler that patient is back from surgery awake alert disoriented to time, place and situation; appears confused not her baseline. VS wnl. Verbalized understanding, continue to monitor.

## 2019-02-12 NOTE — PLAN OF CARE
Problem: Adult Inpatient Plan of Care  Goal: Plan of Care Review  Outcome: Ongoing (interventions implemented as appropriate)  Plan of care discussed w/ pt. Pt AAOx4 and able to make needs known.Pt s/p Lap Lindsay.Inscision clean w/ dry drainage noted..VSSAF. Pain managed w/ PO pain meds.Teds in place. Pt ambulates independently w/ no new falls/Injuries noted.Pt resting w/ no s/s of distress.Safety measures continued.Call bell placed within reach.Will continue to monitor.

## 2019-02-12 NOTE — OP NOTE
DATE OF PROCEDURE:  02/11/2019    PREOPERATIVE DIAGNOSIS:  Acute cholecystitis.    POSTOPERATIVE DIAGNOSIS:  Acute cholecystitis.    OPERATIVE PROCEDURE:  Laparoscopic cholecystectomy.    SURGEON:  Raphael Ray M.D.    ASSISTANT:  Dr. Winnie Turner.    PROCEDURE IN DETAIL:  After adequate premedication, the patient was taken to the   Operating Room, placed on the operating table in supine position.  General   anesthesia administered.  The abdomen was prepped and draped in sterile fashion.    A Veress needle inserted and the abdomen insufflated with CO2.  A Visiscope   with a 5-mm trocar was then introduced, the laparoscope was easily placed with   the Visi.   The other 3 trocars placed in the usual position under direct   vision.  Gallbladder was grasped and elevated.  The neck of the gallbladder   completely skeletonized, identifying cystic duct and cystic artery.  Calot's   triangle dissected.  No anatomic abnormalities were noted.  Cystic duct was   triply clipped and divided.  Cystic artery triply clipped and divided.    Gallbladder removed from the gallbladder fossa utilizing cautery.  Gallbladder   removed through the epigastric trocar site.  The operative site was irrigated.    Hemostasis was checked.  The abdomen was desufflated.  The wounds closed with   4-0 Monocryl.  Blood loss was negligible.  The patient tolerated the procedure   and was transported to Recovery in stable condition.      IRENE  dd: 02/11/2019 11:29:50 (CST)  td: 02/11/2019 19:08:56 (CST)  Doc ID   #[id number]  Job ID #[id number]    CC:

## 2019-02-15 ENCOUNTER — HOSPITAL ENCOUNTER (EMERGENCY)
Facility: HOSPITAL | Age: 49
Discharge: HOME | End: 2019-02-15
Attending: EMERGENCY MEDICINE
Payer: MEDICAID

## 2019-02-15 VITALS
OXYGEN SATURATION: 100 % | HEIGHT: 61 IN | TEMPERATURE: 99 F | BODY MASS INDEX: 32.1 KG/M2 | WEIGHT: 170 LBS | DIASTOLIC BLOOD PRESSURE: 63 MMHG | SYSTOLIC BLOOD PRESSURE: 103 MMHG | RESPIRATION RATE: 18 BRPM | HEART RATE: 74 BPM

## 2019-02-15 DIAGNOSIS — R06.02 SHORTNESS OF BREATH: ICD-10-CM

## 2019-02-15 DIAGNOSIS — M79.605 BILATERAL LOWER EXTREMITY PAIN: ICD-10-CM

## 2019-02-15 DIAGNOSIS — M79.604 BILATERAL LOWER EXTREMITY PAIN: ICD-10-CM

## 2019-02-15 LAB
ALBUMIN SERPL BCP-MCNC: 3.7 G/DL
ALP SERPL-CCNC: 64 U/L
ALT SERPL W/O P-5'-P-CCNC: 72 U/L
ANION GAP SERPL CALC-SCNC: 9 MMOL/L
AST SERPL-CCNC: 54 U/L
BASOPHILS # BLD AUTO: 0.02 K/UL
BASOPHILS NFR BLD: 0.3 %
BILIRUB SERPL-MCNC: 0.6 MG/DL
BNP SERPL-MCNC: 11 PG/ML
BUN SERPL-MCNC: 10 MG/DL
CALCIUM SERPL-MCNC: 9.9 MG/DL
CHLORIDE SERPL-SCNC: 105 MMOL/L
CO2 SERPL-SCNC: 28 MMOL/L
CREAT SERPL-MCNC: 0.8 MG/DL
DIFFERENTIAL METHOD: NORMAL
EOSINOPHIL # BLD AUTO: 0.2 K/UL
EOSINOPHIL NFR BLD: 3.1 %
ERYTHROCYTE [DISTWIDTH] IN BLOOD BY AUTOMATED COUNT: 13.3 %
EST. GFR  (AFRICAN AMERICAN): >60 ML/MIN/1.73 M^2
EST. GFR  (NON AFRICAN AMERICAN): >60 ML/MIN/1.73 M^2
GLUCOSE SERPL-MCNC: 80 MG/DL
HCT VFR BLD AUTO: 39.4 %
HGB BLD-MCNC: 13.2 G/DL
LYMPHOCYTES # BLD AUTO: 2.1 K/UL
LYMPHOCYTES NFR BLD: 33.4 %
MCH RBC QN AUTO: 28.1 PG
MCHC RBC AUTO-ENTMCNC: 33.5 G/DL
MCV RBC AUTO: 84 FL
MONOCYTES # BLD AUTO: 0.5 K/UL
MONOCYTES NFR BLD: 7.3 %
NEUTROPHILS # BLD AUTO: 3.4 K/UL
NEUTROPHILS NFR BLD: 55.9 %
PLATELET # BLD AUTO: 249 K/UL
PMV BLD AUTO: 10.4 FL
POTASSIUM SERPL-SCNC: 3.7 MMOL/L
PROT SERPL-MCNC: 7.4 G/DL
RBC # BLD AUTO: 4.7 M/UL
SODIUM SERPL-SCNC: 142 MMOL/L
TROPONIN I SERPL DL<=0.01 NG/ML-MCNC: <0.006 NG/ML
WBC # BLD AUTO: 6.13 K/UL

## 2019-02-15 PROCEDURE — 93010 ELECTROCARDIOGRAM REPORT: CPT | Mod: ,,, | Performed by: INTERNAL MEDICINE

## 2019-02-15 PROCEDURE — 93010 EKG 12-LEAD: ICD-10-PCS | Mod: ,,, | Performed by: INTERNAL MEDICINE

## 2019-02-15 PROCEDURE — 83880 ASSAY OF NATRIURETIC PEPTIDE: CPT

## 2019-02-15 PROCEDURE — 84484 ASSAY OF TROPONIN QUANT: CPT

## 2019-02-15 PROCEDURE — 80053 COMPREHEN METABOLIC PANEL: CPT

## 2019-02-15 PROCEDURE — 85025 COMPLETE CBC W/AUTO DIFF WBC: CPT

## 2019-02-15 PROCEDURE — 99285 EMERGENCY DEPT VISIT HI MDM: CPT | Mod: 25

## 2019-02-15 PROCEDURE — 93005 ELECTROCARDIOGRAM TRACING: CPT

## 2019-02-15 NOTE — ED TRIAGE NOTES
Pt reports having gall bladder removed on Monday, began having pain in both legs on Tuesday, also reports some shortness of breath and pains in the right side

## 2019-02-16 NOTE — DISCHARGE INSTRUCTIONS
It is unclear what is causing your symptoms today.  Please elevate your legs at rest when possible.  Please return for any new or worsening symptoms such as worsening shortness of breath, worsening swelling, or fever.  Please good all of your postoperative follow-up appointments.

## 2019-02-19 ENCOUNTER — OFFICE VISIT (OUTPATIENT)
Dept: SURGERY | Facility: CLINIC | Age: 49
End: 2019-02-19
Payer: MEDICAID

## 2019-02-19 VITALS
SYSTOLIC BLOOD PRESSURE: 115 MMHG | BODY MASS INDEX: 32.1 KG/M2 | WEIGHT: 170 LBS | HEART RATE: 76 BPM | DIASTOLIC BLOOD PRESSURE: 81 MMHG | HEIGHT: 61 IN

## 2019-02-19 DIAGNOSIS — K81.0 ACUTE CHOLECYSTITIS: Primary | ICD-10-CM

## 2019-02-19 PROCEDURE — 99024 PR POST-OP FOLLOW-UP VISIT: ICD-10-PCS | Mod: S$GLB,,, | Performed by: SURGERY

## 2019-02-19 PROCEDURE — 99024 POSTOP FOLLOW-UP VISIT: CPT | Mod: S$GLB,,, | Performed by: SURGERY

## 2019-06-12 ENCOUNTER — HOSPITAL ENCOUNTER (EMERGENCY)
Facility: HOSPITAL | Age: 49
Discharge: HOME | End: 2019-06-12
Attending: EMERGENCY MEDICINE
Payer: MEDICAID

## 2019-06-12 VITALS
DIASTOLIC BLOOD PRESSURE: 76 MMHG | HEIGHT: 61 IN | TEMPERATURE: 99 F | BODY MASS INDEX: 33.04 KG/M2 | HEART RATE: 75 BPM | SYSTOLIC BLOOD PRESSURE: 125 MMHG | OXYGEN SATURATION: 99 % | RESPIRATION RATE: 20 BRPM | WEIGHT: 175 LBS

## 2019-06-12 DIAGNOSIS — R07.9 CHEST PAIN: ICD-10-CM

## 2019-06-12 DIAGNOSIS — M25.512 ACUTE PAIN OF LEFT SHOULDER: Primary | ICD-10-CM

## 2019-06-12 LAB
ALBUMIN SERPL BCP-MCNC: 4.1 G/DL (ref 3.5–5.2)
ALP SERPL-CCNC: 66 U/L (ref 55–135)
ALT SERPL W/O P-5'-P-CCNC: 20 U/L (ref 10–44)
ANION GAP SERPL CALC-SCNC: 10 MMOL/L (ref 8–16)
AST SERPL-CCNC: 24 U/L (ref 10–40)
BASOPHILS # BLD AUTO: 0.02 K/UL (ref 0–0.2)
BASOPHILS NFR BLD: 0.3 % (ref 0–1.9)
BILIRUB SERPL-MCNC: 0.6 MG/DL (ref 0.1–1)
BUN SERPL-MCNC: 11 MG/DL (ref 6–20)
CALCIUM SERPL-MCNC: 9.7 MG/DL (ref 8.7–10.5)
CHLORIDE SERPL-SCNC: 103 MMOL/L (ref 95–110)
CO2 SERPL-SCNC: 27 MMOL/L (ref 23–29)
CREAT SERPL-MCNC: 0.8 MG/DL (ref 0.5–1.4)
DIFFERENTIAL METHOD: ABNORMAL
EOSINOPHIL # BLD AUTO: 0.1 K/UL (ref 0–0.5)
EOSINOPHIL NFR BLD: 1.8 % (ref 0–8)
ERYTHROCYTE [DISTWIDTH] IN BLOOD BY AUTOMATED COUNT: 13.4 % (ref 11.5–14.5)
EST. GFR  (AFRICAN AMERICAN): >60 ML/MIN/1.73 M^2
EST. GFR  (NON AFRICAN AMERICAN): >60 ML/MIN/1.73 M^2
GLUCOSE SERPL-MCNC: 88 MG/DL (ref 70–110)
HCT VFR BLD AUTO: 41.5 % (ref 37–48.5)
HGB BLD-MCNC: 14.1 G/DL (ref 12–16)
LYMPHOCYTES # BLD AUTO: 3.3 K/UL (ref 1–4.8)
LYMPHOCYTES NFR BLD: 50.3 % (ref 18–48)
MCH RBC QN AUTO: 28.1 PG (ref 27–31)
MCHC RBC AUTO-ENTMCNC: 34 G/DL (ref 32–36)
MCV RBC AUTO: 83 FL (ref 82–98)
MONOCYTES # BLD AUTO: 0.3 K/UL (ref 0.3–1)
MONOCYTES NFR BLD: 5.2 % (ref 4–15)
NEUTROPHILS # BLD AUTO: 2.8 K/UL (ref 1.8–7.7)
NEUTROPHILS NFR BLD: 42.4 % (ref 38–73)
PLATELET # BLD AUTO: 247 K/UL (ref 150–350)
PMV BLD AUTO: 10 FL (ref 9.2–12.9)
POTASSIUM SERPL-SCNC: 3.7 MMOL/L (ref 3.5–5.1)
PROT SERPL-MCNC: 7.6 G/DL (ref 6–8.4)
RBC # BLD AUTO: 5.01 M/UL (ref 4–5.4)
SODIUM SERPL-SCNC: 140 MMOL/L (ref 136–145)
TROPONIN I SERPL DL<=0.01 NG/ML-MCNC: <0.006 NG/ML (ref 0–0.03)
WBC # BLD AUTO: 6.54 K/UL (ref 3.9–12.7)

## 2019-06-12 PROCEDURE — 96374 THER/PROPH/DIAG INJ IV PUSH: CPT

## 2019-06-12 PROCEDURE — 84484 ASSAY OF TROPONIN QUANT: CPT

## 2019-06-12 PROCEDURE — 93005 ELECTROCARDIOGRAM TRACING: CPT

## 2019-06-12 PROCEDURE — 93010 ELECTROCARDIOGRAM REPORT: CPT | Mod: ,,, | Performed by: INTERNAL MEDICINE

## 2019-06-12 PROCEDURE — 85025 COMPLETE CBC W/AUTO DIFF WBC: CPT

## 2019-06-12 PROCEDURE — 80053 COMPREHEN METABOLIC PANEL: CPT

## 2019-06-12 PROCEDURE — 63600175 PHARM REV CODE 636 W HCPCS: Performed by: EMERGENCY MEDICINE

## 2019-06-12 PROCEDURE — 99284 EMERGENCY DEPT VISIT MOD MDM: CPT | Mod: 25

## 2019-06-12 PROCEDURE — 93010 EKG 12-LEAD: ICD-10-PCS | Mod: ,,, | Performed by: INTERNAL MEDICINE

## 2019-06-12 RX ORDER — IBUPROFEN 600 MG/1
600 TABLET ORAL EVERY 6 HOURS PRN
Qty: 20 TABLET | Refills: 0 | Status: SHIPPED | OUTPATIENT
Start: 2019-06-12 | End: 2020-03-31

## 2019-06-12 RX ORDER — KETOROLAC TROMETHAMINE 30 MG/ML
15 INJECTION, SOLUTION INTRAMUSCULAR; INTRAVENOUS
Status: COMPLETED | OUTPATIENT
Start: 2019-06-12 | End: 2019-06-12

## 2019-06-12 RX ORDER — METHOCARBAMOL 500 MG/1
1000 TABLET, FILM COATED ORAL 3 TIMES DAILY PRN
Qty: 30 TABLET | Refills: 0 | Status: SHIPPED | OUTPATIENT
Start: 2019-06-12 | End: 2019-06-17

## 2019-06-12 RX ADMIN — KETOROLAC TROMETHAMINE 15 MG: 30 INJECTION, SOLUTION INTRAMUSCULAR at 12:06

## 2019-06-12 NOTE — ED TRIAGE NOTES
Pt reports pain to the chest- midsternal and right breast- since 2AM, pain to the left shoulder, hurts to move. Tenderness to the left chest and left shoulder on palpation

## 2019-06-12 NOTE — ED PROVIDER NOTES
Encounter Date: 2019    SCRIBE #1 NOTE: I, Pantera Maurice, am scribing for, and in the presence of,  Kole Doss Jr., MD. I have scribed the following portions of the note - Other sections scribed: HPI and ROS.       History     Chief Complaint   Patient presents with    Chest Pain     left sided chest pain started approx 2 am today, took ASA, went away but then came back.  pain radiates to left shoulder.    denies sob, n/v/d or fever     CC: Chest Pain     HPI: This 48 y.o F with a hx of Fatty liver, Gall stones, GERD, HTN and heart attack () presents to the ED c/o acute onset of intermittent and severe (10) chest pain under the right breast radiating to the left side since 0200h. The pt also reports left shoulder pain which is worse with ROM.  She also reports SOB yesterday, which has since resolved. Additionally, the pt reports diffuse left arm bruising, but denies trauma. She attempted tx with ASA which temporarily relieved her chest pain. She denies fever, chills, diaphoresis, nausea, emesis, diarrhea, abdominal pain, leg swelling, leg pain, back pain and rash no prior tx.     PSHx:  section, low transverse; Tumor removal; Hysterectomy; Cyst Removal; and Laparoscopic cholecystectomy (N/A, 2019)    The history is provided by the patient. No  was used.     Review of patient's allergies indicates:   Allergen Reactions    Sulfa (sulfonamide antibiotics) Hives and Swelling     Past Medical History:   Diagnosis Date    Fatty liver     Gall stones 2015    GERD (gastroesophageal reflux disease)     Hypertension      Past Surgical History:   Procedure Laterality Date     SECTION, LOW TRANSVERSE      CHOLECYSTECTOMY, LAPAROSCOPIC N/A 2019    [facility] at [facility]    CYST REMOVAL      HYSTERECTOMY      TUMOR REMOVAL       Family History   Problem Relation Age of Onset    Breast cancer Neg Hx     Colon cancer Neg Hx      Ovarian cancer Neg Hx      Social History     Tobacco Use    Smoking status: Never Smoker    Smokeless tobacco: Never Used   Substance Use Topics    Alcohol use: No    Drug use: No     Review of Systems   Constitutional: Negative for chills, diaphoresis and fever.   HENT: Negative for rhinorrhea and sore throat.    Eyes: Negative for redness.   Respiratory: Positive for shortness of breath. Negative for cough.    Cardiovascular: Positive for chest pain.   Gastrointestinal: Negative for abdominal pain, diarrhea, nausea and vomiting.   Genitourinary: Negative for dysuria, frequency and urgency.   Musculoskeletal: Negative for back pain and neck pain.        (+) L shoulder pain    Skin: Negative for rash.   Psychiatric/Behavioral: The patient is not nervous/anxious.        Physical Exam     Initial Vitals [06/12/19 1151]   BP Pulse Resp Temp SpO2   (!) 145/83 68 18 98.2 °F (36.8 °C) 99 %      MAP       --         Physical Exam    Nursing note and vitals reviewed.  Constitutional: She appears well-developed and well-nourished. She is not diaphoretic. No distress.   HENT:   Head: Normocephalic and atraumatic.   Right Ear: External ear normal.   Left Ear: External ear normal.   Nose: Nose normal.   Mouth/Throat: Oropharynx is clear and moist.   Eyes: Conjunctivae and EOM are normal. Pupils are equal, round, and reactive to light. Right eye exhibits no discharge. Left eye exhibits no discharge. No scleral icterus.   Neck: Normal range of motion. Neck supple.   Cardiovascular: Normal rate, regular rhythm, normal heart sounds and intact distal pulses.   No murmur heard.  Regular rate and rhythm. No murmurs.   Pulmonary/Chest: Breath sounds normal. No respiratory distress. She has no wheezes. She has no rhonchi. She has no rales. She exhibits tenderness.   Lungs are clear and equal bilaterally without wheezes, rales, rhonchi.  Patient is not in respiratory distress.  She does have chest wall tenderness with palpation near  the glenohumeral joint as well as in the left side of the chest near the sternum.   Abdominal: Soft. Bowel sounds are normal. She exhibits no distension and no mass. There is no tenderness. There is no rebound and no guarding.   Musculoskeletal: Normal range of motion. She exhibits no edema or tenderness.   Neurological: She is alert and oriented to person, place, and time. She has normal strength. No cranial nerve deficit or sensory deficit.   Skin: Skin is warm and dry. No rash noted. No erythema. No pallor.   Psychiatric: She has a normal mood and affect. Her behavior is normal. Judgment and thought content normal.         ED Course   Procedures  Labs Reviewed   CBC W/ AUTO DIFFERENTIAL - Abnormal; Notable for the following components:       Result Value    Lymph% 50.3 (*)     All other components within normal limits   COMPREHENSIVE METABOLIC PANEL   TROPONIN I     EKG Readings: (Independently Interpreted)   Initial Reading: No STEMI. Ectopy: No Ectopy.   EKG reviewed and interpreted by me shows sinus rhythm at a rate of 72.  P.r., QRS, QT intervals within normal limits.  There is left axis deviation.  There is poor R-wave progression.  There are no ST segment or T-wave ischemic findings.         Imaging Results          X-Ray Chest PA And Lateral (Final result)  Result time 06/12/19 13:21:27    Final result by Ashok Davidson MD (06/12/19 13:21:27)                 Impression:      1. No acute cardiopulmonary process.      Electronically signed by: Ashok Davidson MD  Date:    06/12/2019  Time:    13:21             Narrative:    EXAMINATION:  XR CHEST PA AND LATERAL    CLINICAL HISTORY:  Chest pain, unspecified    TECHNIQUE:  PA and lateral views of the chest were performed.    COMPARISON:  02/15/2019    FINDINGS:  The cardiomediastinal silhouette is not enlarged.  There is no pleural effusion.  The trachea is midline.  The lungs are symmetrically expanded bilaterally without evidence of acute parenchymal  process. No large focal consolidation seen.  There is no pneumothorax.  The osseous structures are unremarkable.  Surgical change overlies the right upper quadrant.                              X-Rays:   Independently Interpreted Readings:   Other Readings:  Chest x-ray reveals no evidence of infiltrate, consolidation, pleural effusion, pneumothorax, pulmonary edema.    Medical Decision Making:   ED Management:  This is the emergent evaluation of a 40-year-old female presents emergency department for evaluation of chest pain that started approximately 10 hr ago.  Differential diagnosis at the time of initial evaluation included, but was not limited to: Acute myocardial infarction, acute coronary syndrome, pericarditis, myocarditis, pneumonia, pneumothorax, gastroesophageal reflux disease, pleurisy, costochondritis.  I considered but doubt pulmonary embolus.  Patient has no history of recent immobilization such as travel or surgery, and history of cigarette smoking, history of thromboembolic disease, or any lower extremity edema, erythema, or asymmetry.       This patient has chest tenderness on examination. Her EKG does not reveal any acute appearing ischemic pattern.  Her chest x-ray is clear.  Her vital signs are reassuring.  Heart Score is 2. Her troponin is undetectable though she has had symptoms for approximately 10 hr.  Given the above, I believe this is unlikely to be acute coronary syndrome.  I doubt underlying pulmonary etiology.  I believe this is most likely musculoskeletal nature.  Patient has full range of motion of the left shoulder joint I do not suspect septic joint.  I did advise the patient to follow up with her PCP in the next 2-3 days and return to the emergency department for new or worsening symptoms, such as severe worsening of chest pain, shortness of breath, fever, or inability to move her left shoulder joint.              Scribe Attestation:   Scribe #1: I performed the above scribed  service and the documentation accurately describes the services I performed. I attest to the accuracy of the note.    Attending Attestation:           Physician Attestation for Scribe:  Physician Attestation Statement for Scribe #1: I, Kole Doss Jr., MD, reviewed documentation, as scribed by Pantera Maurice in my presence, and it is both accurate and complete.                    Clinical Impression:       ICD-10-CM ICD-9-CM   1. Acute pain of left shoulder M25.512 719.41   2. Chest pain R07.9 786.50                                Kole Doss Jr., MD  06/12/19 3808

## 2019-06-12 NOTE — DISCHARGE INSTRUCTIONS
Please follow up with your regular doctor in the next 3 days for re-evaluation of your symptoms.   you may take medications as prescribed.  Return if you develop new or worsening symptoms such as fever, chills, shortness of breath, worsening chest pain,  or inability to move your shoulder joint.

## 2019-09-27 DIAGNOSIS — M54.50 LUMBAR PAIN WITH RADIATION DOWN BOTH LEGS: ICD-10-CM

## 2019-09-27 DIAGNOSIS — M79.605 LUMBAR PAIN WITH RADIATION DOWN BOTH LEGS: ICD-10-CM

## 2019-09-27 DIAGNOSIS — M25.512 BILATERAL SHOULDER PAIN: Primary | ICD-10-CM

## 2019-09-27 DIAGNOSIS — G89.21 CHRONIC PAIN DUE TO TRAUMA: ICD-10-CM

## 2019-09-27 DIAGNOSIS — M79.604 LUMBAR PAIN WITH RADIATION DOWN BOTH LEGS: ICD-10-CM

## 2019-09-27 DIAGNOSIS — M25.511 BILATERAL SHOULDER PAIN: Primary | ICD-10-CM

## 2019-09-27 DIAGNOSIS — M54.2 CERVICALGIA: ICD-10-CM

## 2019-10-08 DIAGNOSIS — N63.20 LUMP OF LEFT BREAST: Primary | ICD-10-CM

## 2019-10-20 ENCOUNTER — HOSPITAL ENCOUNTER (EMERGENCY)
Facility: HOSPITAL | Age: 49
Discharge: HOME | End: 2019-10-20
Attending: EMERGENCY MEDICINE
Payer: MEDICAID

## 2019-10-20 VITALS
DIASTOLIC BLOOD PRESSURE: 93 MMHG | SYSTOLIC BLOOD PRESSURE: 123 MMHG | OXYGEN SATURATION: 97 % | RESPIRATION RATE: 17 BRPM | BODY MASS INDEX: 34.17 KG/M2 | TEMPERATURE: 99 F | WEIGHT: 181 LBS | HEART RATE: 90 BPM | HEIGHT: 61 IN
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
## 2019-10-20 DIAGNOSIS — S39.012A REPETITIVE STRAIN INJURY OF LOWER BACK, INITIAL ENCOUNTER: ICD-10-CM

## 2019-10-20 DIAGNOSIS — G89.29 CHRONIC LEFT SHOULDER PAIN: Primary | ICD-10-CM

## 2019-10-20 DIAGNOSIS — M25.512 CHRONIC LEFT SHOULDER PAIN: Primary | ICD-10-CM

## 2019-10-20 LAB
B-HCG UR QL: NEGATIVE
CTP QC/QA: YES

## 2019-10-20 PROCEDURE — 96372 THER/PROPH/DIAG INJ SC/IM: CPT

## 2019-10-20 PROCEDURE — 81025 URINE PREGNANCY TEST: CPT | Performed by: EMERGENCY MEDICINE

## 2019-10-20 PROCEDURE — 25000003 PHARM REV CODE 250: Performed by: NURSE PRACTITIONER

## 2019-10-20 PROCEDURE — 63600175 PHARM REV CODE 636 W HCPCS: Performed by: NURSE PRACTITIONER

## 2019-10-20 PROCEDURE — 99284 EMERGENCY DEPT VISIT MOD MDM: CPT | Mod: 25

## 2019-10-20 RX ORDER — DICLOFENAC SODIUM 50 MG/1
50 TABLET, DELAYED RELEASE ORAL 2 TIMES DAILY
COMMUNITY
End: 2020-06-22 | Stop reason: ALTCHOICE

## 2019-10-20 RX ORDER — TIZANIDINE 4 MG/1
4 TABLET ORAL EVERY 6 HOURS PRN
COMMUNITY
End: 2020-03-31

## 2019-10-20 RX ORDER — LIDOCAINE 50 MG/G
3 PATCH TOPICAL DAILY
Qty: 30 PATCH | Refills: 0 | Status: SHIPPED | OUTPATIENT
Start: 2019-10-20

## 2019-10-20 RX ORDER — TRIAMCINOLONE ACETONIDE 40 MG/ML
40 INJECTION, SUSPENSION INTRA-ARTICULAR; INTRAMUSCULAR
Status: COMPLETED | OUTPATIENT
Start: 2019-10-20 | End: 2019-10-20

## 2019-10-20 RX ORDER — LIDOCAINE 50 MG/G
3 PATCH TOPICAL
Status: DISCONTINUED | OUTPATIENT
Start: 2019-10-20 | End: 2019-10-21 | Stop reason: HOSPADM

## 2019-10-20 RX ADMIN — LIDOCAINE 3 PATCH: 50 PATCH TOPICAL at 10:10

## 2019-10-20 RX ADMIN — TRIAMCINOLONE ACETONIDE 40 MG: 40 INJECTION, SUSPENSION INTRA-ARTICULAR; INTRAMUSCULAR at 10:10

## 2019-10-21 NOTE — ED TRIAGE NOTES
Pt complains of back pain that started on Wednesday last week, she stated that she couldn't bend down and rated the 10/10. She added that she is suffering from left arm pain. She took 50 mg of diclofenac sodium and 4 mg of zanaflex before coming to the ED. She denies fever, N&V.

## 2019-10-21 NOTE — ED PROVIDER NOTES
Encounter Date: 10/20/2019    SCRIBE #1 NOTE: I, Louisa Cee, am scribing for, and in the presence of,  Lan Avila DNP. I have scribed the following portions of the note - Other sections scribed: HPI, ROS, PE.       History     Chief Complaint   Patient presents with    Back Pain     pt here for back pain that began over a week ago. denies fall/trauma.     Arm Pain     c/o left arm pain x1 mo. attempted tx with ibuprofen and robaxin with no relief.      CC: Back pain    HPI: This is a 48 y.o. female with a PMHx of GERD and hypertension who presents to the Emergency Department with a cc of lower back pain for one week. Patient also reports chronic 10/10 right shoulder pain that radiates down her arm. She denies numbness or tingling. She also denies trauma or injury. Back pain is worsened with movement and deep breaths and shoulder pain is worsened with movement. Patient reports no prior history of similar symptoms aside from chronic shoulder pain. She is not a smoker and does not drink or use drugs. Patient is allergic to Sulfa.      The history is provided by the patient. No  was used.     Review of patient's allergies indicates:   Allergen Reactions    Sulfa (sulfonamide antibiotics) Hives and Swelling     Past Medical History:   Diagnosis Date    Fatty liver     Gall stones 2015    GERD (gastroesophageal reflux disease)     Hypertension      Past Surgical History:   Procedure Laterality Date     SECTION, LOW TRANSVERSE      CYST REMOVAL      HYSTERECTOMY      LAPAROSCOPIC CHOLECYSTECTOMY N/A 2019    Procedure: CHOLECYSTECTOMY, LAPAROSCOPIC;  Surgeon: Raphael Ray MD;  Location: [facility] OR;  Service: General;  Laterality: N/A;    TUMOR REMOVAL       Family History   Problem Relation Age of Onset    Breast cancer Neg Hx     Colon cancer Neg Hx     Ovarian cancer Neg Hx      Social History     Tobacco Use    Smoking status: Never Smoker    Smokeless  tobacco: Never Used   Substance Use Topics    Alcohol use: No    Drug use: No     Review of Systems   Constitutional: Negative for fever.   HENT: Negative for sore throat.    Respiratory: Negative for shortness of breath.    Cardiovascular: Negative for chest pain.   Gastrointestinal: Negative for nausea.   Genitourinary: Negative for dysuria.   Musculoskeletal: Positive for back pain.        (+) Right shoulder pain   Skin: Negative for rash.   Neurological: Negative for weakness and numbness.   Hematological: Does not bruise/bleed easily.       Physical Exam     Initial Vitals [10/20/19 2203]   BP Pulse Resp Temp SpO2   128/84 96 18 98.5 °F (36.9 °C) 99 %      MAP       --         Physical Exam    Nursing note and vitals reviewed.  Constitutional: She appears well-developed and well-nourished.   HENT:   Head: Normocephalic and atraumatic.   Eyes: EOM are normal. Pupils are equal, round, and reactive to light.   Neck: Normal range of motion. Neck supple.   Cardiovascular: Normal rate and regular rhythm.   Pulmonary/Chest: Breath sounds normal. No respiratory distress.   Abdominal: Soft. Bowel sounds are normal.   Musculoskeletal: Normal range of motion. She exhibits no edema.   Spine is atraumatic, without step-offs or tenderness. Left shoulder with limited rom, no crepitus, distal psm intact, pos empty can test, pos apprehension   Neurological: She is alert and oriented to person, place, and time. She has normal strength. No cranial nerve deficit or sensory deficit. She exhibits normal muscle tone. She displays a negative Romberg sign. Coordination and gait normal. GCS eye subscore is 4. GCS verbal subscore is 5. GCS motor subscore is 6.   Equal  strength bilaterally, equal bicep flexion and tricep extension strength, leg extension and flexion strength appropriate and equal, foot plantar- and dorsi-flexion equal and appropriate   Skin: Skin is warm and dry. Capillary refill takes less than 2 seconds.    Psychiatric: She has a normal mood and affect. Her behavior is normal.         ED Course   Procedures  Labs Reviewed - No data to display       Imaging Results    None          Medical Decision Making:   Initial Assessment:   48-year-old female with chronic left shoulder pain and new lower back pain that does not radiate.  There is no sign of cauda equina, no sign of epidural abscess.  There has been no trauma.  Differential Diagnosis:   Fracture, dislocation, sprain, strain, septic joint, back strain/spasm, sciatica  ED Management:  Initial orders include triamcinolone 40 mg IM, Lidoderm patches            Scribe Attestation:   Scribe #1: I performed the above scribed service and the documentation accurately describes the services I performed. I attest to the accuracy of the note.            ED Course as of Oct 20 2314   Sun Oct 20, 2019   4499 Preg Test, Ur: Negative [VC]      ED Course User Index  [VC] Lan Avila DNP     Clinical Impression:     1. Chronic left shoulder pain    2. Repetitive strain injury of lower back, initial encounter      Patient is discharged home in good condition to follow up with her primary care provider.  She should return for any worsening or changes in condition.  Referral has been provided for Orthopedics.  Prescriptions have been provided for Lidoderm patches.  Symptomatic therapies and return precautions on AVS.   Medication choices were made after reviewing allergies, medications, history, available laboratories.       Disposition:   Disposition: Discharged  Condition: Stable    STEVEN JONES DNP ACNP-BC FNP-C, personally performed the services described in this documentation. All medical record entries made by the scribe were at my direction and in my presence.  I have reviewed the chart and agree that the record reflects my personal performance and is accurate and complete.                    Lan Avila DNP  10/20/19 0090

## 2019-10-25 ENCOUNTER — HOSPITAL ENCOUNTER (OUTPATIENT)
Dept: RADIOLOGY | Facility: HOSPITAL | Age: 49
Discharge: HOME | End: 2019-10-25
Attending: NURSE PRACTITIONER
Payer: MEDICAID

## 2019-10-25 VITALS — WEIGHT: 181 LBS | BODY MASS INDEX: 34.17 KG/M2 | HEIGHT: 61 IN

## 2019-10-25 DIAGNOSIS — N63.20 LUMP OF LEFT BREAST: ICD-10-CM

## 2019-10-25 PROCEDURE — 77066 DX MAMMO INCL CAD BI: CPT | Mod: TC

## 2019-10-25 PROCEDURE — 76642 ULTRASOUND BREAST LIMITED: CPT | Mod: 26,LT,, | Performed by: RADIOLOGY

## 2019-10-25 PROCEDURE — 76642 US BREAST LEFT LIMITED: ICD-10-PCS | Mod: 26,LT,, | Performed by: RADIOLOGY

## 2019-10-25 PROCEDURE — 77062 MAMMO DIGITAL DIAGNOSTIC BILAT WITH TOMOSYNTHESIS_CAD: ICD-10-PCS | Mod: 26,,, | Performed by: RADIOLOGY

## 2019-10-25 PROCEDURE — 77062 BREAST TOMOSYNTHESIS BI: CPT | Mod: 26,,, | Performed by: RADIOLOGY

## 2019-10-25 PROCEDURE — 77066 MAMMO DIGITAL DIAGNOSTIC BILAT WITH TOMOSYNTHESIS_CAD: ICD-10-PCS | Mod: 26,,, | Performed by: RADIOLOGY

## 2019-10-25 PROCEDURE — 77066 DX MAMMO INCL CAD BI: CPT | Mod: 26,,, | Performed by: RADIOLOGY

## 2019-10-25 PROCEDURE — 76642 ULTRASOUND BREAST LIMITED: CPT | Mod: TC,LT

## 2020-03-31 ENCOUNTER — NURSE TRIAGE (OUTPATIENT)
Dept: ADMINISTRATIVE | Facility: CLINIC | Age: 50
End: 2020-03-31

## 2020-03-31 ENCOUNTER — HOSPITAL ENCOUNTER (EMERGENCY)
Facility: HOSPITAL | Age: 50
Discharge: HOME | End: 2020-04-01
Attending: EMERGENCY MEDICINE
Payer: MEDICAID

## 2020-03-31 DIAGNOSIS — J18.9 MULTIFOCAL PNEUMONIA: ICD-10-CM

## 2020-03-31 DIAGNOSIS — R07.9 CHEST PAIN: ICD-10-CM

## 2020-03-31 DIAGNOSIS — U07.1 COVID-19 VIRUS INFECTION: Primary | ICD-10-CM

## 2020-03-31 DIAGNOSIS — R06.02 SOB (SHORTNESS OF BREATH): ICD-10-CM

## 2020-03-31 LAB
ALBUMIN SERPL BCP-MCNC: 3.2 G/DL (ref 3.5–5.2)
ALP SERPL-CCNC: 67 U/L (ref 55–135)
ALT SERPL W/O P-5'-P-CCNC: 28 U/L (ref 10–44)
ANION GAP SERPL CALC-SCNC: 16 MMOL/L (ref 8–16)
AST SERPL-CCNC: 47 U/L (ref 10–40)
B-HCG UR QL: NEGATIVE
BACTERIA #/AREA URNS HPF: ABNORMAL /HPF
BASOPHILS # BLD AUTO: 0.01 K/UL (ref 0–0.2)
BASOPHILS NFR BLD: 0.1 % (ref 0–1.9)
BILIRUB SERPL-MCNC: 0.6 MG/DL (ref 0.1–1)
BILIRUB UR QL STRIP: NEGATIVE
BUN SERPL-MCNC: 8 MG/DL (ref 6–20)
CALCIUM SERPL-MCNC: 8.2 MG/DL (ref 8.7–10.5)
CHLORIDE SERPL-SCNC: 99 MMOL/L (ref 95–110)
CLARITY UR: CLEAR
CO2 SERPL-SCNC: 22 MMOL/L (ref 23–29)
COLOR UR: YELLOW
CREAT SERPL-MCNC: 0.9 MG/DL (ref 0.5–1.4)
CTP QC/QA: YES
D DIMER PPP IA.FEU-MCNC: 2.07 MG/L FEU
DIFFERENTIAL METHOD: ABNORMAL
EOSINOPHIL # BLD AUTO: 0 K/UL (ref 0–0.5)
EOSINOPHIL NFR BLD: 0 % (ref 0–8)
ERYTHROCYTE [DISTWIDTH] IN BLOOD BY AUTOMATED COUNT: 12.4 % (ref 11.5–14.5)
EST. GFR  (AFRICAN AMERICAN): >60 ML/MIN/1.73 M^2
EST. GFR  (NON AFRICAN AMERICAN): >60 ML/MIN/1.73 M^2
GLUCOSE SERPL-MCNC: 102 MG/DL (ref 70–110)
GLUCOSE UR QL STRIP: NEGATIVE
HCT VFR BLD AUTO: 41 % (ref 37–48.5)
HGB BLD-MCNC: 14.2 G/DL (ref 12–16)
HGB UR QL STRIP: NEGATIVE
HYALINE CASTS #/AREA URNS LPF: 1 /LPF
IMM GRANULOCYTES # BLD AUTO: 0.08 K/UL (ref 0–0.04)
IMM GRANULOCYTES NFR BLD AUTO: 1 % (ref 0–0.5)
KETONES UR QL STRIP: ABNORMAL
LACTATE SERPL-SCNC: 2 MMOL/L (ref 0.5–2.2)
LEUKOCYTE ESTERASE UR QL STRIP: NEGATIVE
LYMPHOCYTES # BLD AUTO: 2.2 K/UL (ref 1–4.8)
LYMPHOCYTES NFR BLD: 27.2 % (ref 18–48)
MCH RBC QN AUTO: 27.4 PG (ref 27–31)
MCHC RBC AUTO-ENTMCNC: 34.6 G/DL (ref 32–36)
MCV RBC AUTO: 79 FL (ref 82–98)
MICROSCOPIC COMMENT: ABNORMAL
MONOCYTES # BLD AUTO: 0.4 K/UL (ref 0.3–1)
MONOCYTES NFR BLD: 4.8 % (ref 4–15)
NEUTROPHILS # BLD AUTO: 5.4 K/UL (ref 1.8–7.7)
NEUTROPHILS NFR BLD: 66.9 % (ref 38–73)
NITRITE UR QL STRIP: NEGATIVE
NON-SQ EPI CELLS #/AREA URNS HPF: 1 /HPF
NRBC BLD-RTO: 0 /100 WBC
PH UR STRIP: 6 [PH] (ref 5–8)
PLATELET # BLD AUTO: 200 K/UL (ref 150–350)
PMV BLD AUTO: 10.6 FL (ref 9.2–12.9)
POTASSIUM SERPL-SCNC: 2.8 MMOL/L (ref 3.5–5.1)
PROCALCITONIN SERPL IA-MCNC: 0.07 NG/ML
PROT SERPL-MCNC: 7.2 G/DL (ref 6–8.4)
PROT UR QL STRIP: ABNORMAL
RBC # BLD AUTO: 5.18 M/UL (ref 4–5.4)
RBC #/AREA URNS HPF: 0 /HPF (ref 0–4)
SODIUM SERPL-SCNC: 137 MMOL/L (ref 136–145)
SP GR UR STRIP: 1.01 (ref 1–1.03)
SQUAMOUS #/AREA URNS HPF: 3 /HPF
TROPONIN I SERPL DL<=0.01 NG/ML-MCNC: 0.01 NG/ML (ref 0–0.03)
URN SPEC COLLECT METH UR: ABNORMAL
UROBILINOGEN UR STRIP-ACNC: ABNORMAL EU/DL
WBC # BLD AUTO: 8.05 K/UL (ref 3.9–12.7)
WBC #/AREA URNS HPF: 3 /HPF (ref 0–5)

## 2020-03-31 PROCEDURE — 83605 ASSAY OF LACTIC ACID: CPT

## 2020-03-31 PROCEDURE — 85025 COMPLETE CBC W/AUTO DIFF WBC: CPT

## 2020-03-31 PROCEDURE — 80053 COMPREHEN METABOLIC PANEL: CPT

## 2020-03-31 PROCEDURE — 86140 C-REACTIVE PROTEIN: CPT

## 2020-03-31 PROCEDURE — 85379 FIBRIN DEGRADATION QUANT: CPT

## 2020-03-31 PROCEDURE — 84484 ASSAY OF TROPONIN QUANT: CPT

## 2020-03-31 PROCEDURE — 25000003 PHARM REV CODE 250: Performed by: EMERGENCY MEDICINE

## 2020-03-31 PROCEDURE — 96375 TX/PRO/DX INJ NEW DRUG ADDON: CPT

## 2020-03-31 PROCEDURE — 93005 ELECTROCARDIOGRAM TRACING: CPT

## 2020-03-31 PROCEDURE — 99285 EMERGENCY DEPT VISIT HI MDM: CPT | Mod: 25

## 2020-03-31 PROCEDURE — 81025 URINE PREGNANCY TEST: CPT | Performed by: EMERGENCY MEDICINE

## 2020-03-31 PROCEDURE — 63600175 PHARM REV CODE 636 W HCPCS: Performed by: EMERGENCY MEDICINE

## 2020-03-31 PROCEDURE — 84145 PROCALCITONIN (PCT): CPT

## 2020-03-31 PROCEDURE — 96374 THER/PROPH/DIAG INJ IV PUSH: CPT

## 2020-03-31 PROCEDURE — U0002 COVID-19 LAB TEST NON-CDC: HCPCS

## 2020-03-31 PROCEDURE — 81000 URINALYSIS NONAUTO W/SCOPE: CPT

## 2020-03-31 PROCEDURE — S0028 INJECTION, FAMOTIDINE, 20 MG: HCPCS | Performed by: EMERGENCY MEDICINE

## 2020-03-31 PROCEDURE — 25000003 PHARM REV CODE 250: Performed by: NURSE PRACTITIONER

## 2020-03-31 RX ORDER — FAMOTIDINE 10 MG/ML
20 INJECTION INTRAVENOUS
Status: COMPLETED | OUTPATIENT
Start: 2020-03-31 | End: 2020-03-31

## 2020-03-31 RX ORDER — ACETAMINOPHEN 500 MG
1000 TABLET ORAL
Status: COMPLETED | OUTPATIENT
Start: 2020-03-31 | End: 2020-03-31

## 2020-03-31 RX ORDER — ONDANSETRON 4 MG/1
8 TABLET, FILM COATED ORAL 2 TIMES DAILY
COMMUNITY

## 2020-03-31 RX ORDER — KETOROLAC TROMETHAMINE 30 MG/ML
15 INJECTION, SOLUTION INTRAMUSCULAR; INTRAVENOUS
Status: COMPLETED | OUTPATIENT
Start: 2020-03-31 | End: 2020-03-31

## 2020-03-31 RX ORDER — ONDANSETRON 2 MG/ML
4 INJECTION INTRAMUSCULAR; INTRAVENOUS
Status: COMPLETED | OUTPATIENT
Start: 2020-03-31 | End: 2020-03-31

## 2020-03-31 RX ADMIN — FAMOTIDINE 20 MG: 10 INJECTION INTRAVENOUS at 11:03

## 2020-03-31 RX ADMIN — KETOROLAC TROMETHAMINE 15 MG: 30 INJECTION, SOLUTION INTRAMUSCULAR; INTRAVENOUS at 11:03

## 2020-03-31 RX ADMIN — LIDOCAINE HYDROCHLORIDE: 20 SOLUTION ORAL; TOPICAL at 11:03

## 2020-03-31 RX ADMIN — ACETAMINOPHEN 1000 MG: 500 TABLET ORAL at 10:03

## 2020-03-31 RX ADMIN — ONDANSETRON HYDROCHLORIDE 4 MG: 2 SOLUTION INTRAMUSCULAR; INTRAVENOUS at 11:03

## 2020-04-01 VITALS
RESPIRATION RATE: 19 BRPM | BODY MASS INDEX: 33.04 KG/M2 | HEART RATE: 79 BPM | TEMPERATURE: 99 F | WEIGHT: 175 LBS | DIASTOLIC BLOOD PRESSURE: 71 MMHG | SYSTOLIC BLOOD PRESSURE: 118 MMHG | HEIGHT: 61 IN | OXYGEN SATURATION: 95 %

## 2020-04-01 LAB
CRP SERPL-MCNC: 107.6 MG/L (ref 0–8.2)
SARS-COV-2 RNA RESP QL NAA+PROBE: DETECTED

## 2020-04-01 PROCEDURE — 63700000 PHARM REV CODE 250 ALT 637 W/O HCPCS: Performed by: EMERGENCY MEDICINE

## 2020-04-01 PROCEDURE — 25500020 PHARM REV CODE 255: Performed by: EMERGENCY MEDICINE

## 2020-04-01 RX ORDER — POTASSIUM CHLORIDE 20 MEQ/15ML
60 SOLUTION ORAL
Status: COMPLETED | OUTPATIENT
Start: 2020-04-01 | End: 2020-04-01

## 2020-04-01 RX ORDER — AZITHROMYCIN 250 MG/1
TABLET, FILM COATED ORAL
Qty: 6 TABLET | Refills: 0 | Status: SHIPPED | OUTPATIENT
Start: 2020-04-01 | End: 2020-04-06

## 2020-04-01 RX ADMIN — IOHEXOL 75 ML: 350 INJECTION, SOLUTION INTRAVENOUS at 12:04

## 2020-04-01 RX ADMIN — POTASSIUM CHLORIDE 60 MEQ: 20 SOLUTION ORAL at 01:04

## 2020-04-01 NOTE — DISCHARGE INSTRUCTIONS
You were seen in the emergency department for a fever and upper respiratory illness.  Your exam and chest CT are otherwise reassuring.  It is entirely possible that you have the corona virus with pneumonia.  For most people, the symptoms are mild and get better within a few days.  In a small percentage of cases, symptoms can progress to worsening pneumonia, respiratory failure, and severe illness.  Please monitor yourself carefully.  Please return should you begin to experience worsening shortness of breath, difficulty breathing, dizziness, or you become concerned in any other way.

## 2020-04-01 NOTE — ED PROVIDER NOTES
"Encounter Date: 3/31/2020    SCRIBE #1 NOTE: I, Leila Angel, am scribing for, and in the presence of,  Luke Tran MD. I have scribed the following portions of the note - Other sections scribed: HPI, ROS, PE, EKG.       History     Chief Complaint   Patient presents with    Chest Pain     "Yesterday I was throwing up blood, when I breathe my chest hurt, and cough."     CC: CP    Patient is a 49 y.o female with a PMHx of HTN and GERD who presents to the ED complaining of CP secondary to respiration, productive cough, and emesis that began yesterday. Patient reports of multiple episodes of hematemesis that began yesterday. She reports of associated fever, chills, diarrhea, and generalized myalgias since 2020. Patient was initially evaluated and treated at [facility] for symptoms 2 days ago. She received a COVID-19 test at that time. Patient admits to taking nausea medication and Tylenol every 6 hrs without relief. There has been no improvement or worsening of symptoms. She reports of sick contact with son who tested positive for COVID-19. Patient denies abdominal pain, SOB, and leg swelling. No Hx of blood clots. PSHx of cholecystectomy. Patient is compliant with daily medication. Patient is allergic to Sulfa. No SHx of smoking tobacco products, alcohol consumption, or drug use.     The history is provided by the patient.     Review of patient's allergies indicates:   Allergen Reactions    Sulfa (sulfonamide antibiotics) Hives and Swelling     Past Medical History:   Diagnosis Date    Fatty liver     Gall stones 2015    GERD (gastroesophageal reflux disease)     Hypertension      Past Surgical History:   Procedure Laterality Date     SECTION, LOW TRANSVERSE      CYST REMOVAL      HYSTERECTOMY      LAPAROSCOPIC CHOLECYSTECTOMY N/A 2019    Procedure: CHOLECYSTECTOMY, LAPAROSCOPIC;  Surgeon: Raphael Ray MD;  Location: [facility] OR;  Service: General;  Laterality: N/A;    TUMOR " REMOVAL       Family History   Problem Relation Age of Onset    Breast cancer Neg Hx     Colon cancer Neg Hx     Ovarian cancer Neg Hx      Social History     Tobacco Use    Smoking status: Never Smoker    Smokeless tobacco: Never Used   Substance Use Topics    Alcohol use: No    Drug use: No     Review of Systems   Constitutional: Positive for chills and fever.   HENT: Negative for sore throat.    Respiratory: Positive for cough (Productive). Negative for shortness of breath.    Cardiovascular: Positive for chest pain. Negative for leg swelling.   Gastrointestinal: Positive for diarrhea, nausea and vomiting (Hematemesis). Negative for abdominal pain.   Genitourinary: Negative for dysuria.   Musculoskeletal: Positive for myalgias (Generalized).   Skin: Negative for rash.   Neurological: Negative for headaches.   Psychiatric/Behavioral: Negative for confusion.       Physical Exam     Initial Vitals [03/31/20 2135]   BP Pulse Resp Temp SpO2   120/72 93 20 (!) 101.4 °F (38.6 °C) (!) 94 %      MAP       --         Physical Exam    Nursing note and vitals reviewed.  Constitutional: She appears well-developed and well-nourished. She is not diaphoretic. No distress.   HENT:   Head: Normocephalic and atraumatic.   Mouth/Throat: Oropharynx is clear and moist.   Eyes: Conjunctivae and EOM are normal. Pupils are equal, round, and reactive to light. Right eye exhibits no discharge. Left eye exhibits no discharge.   Neck: Normal range of motion. No thyromegaly present.   Cardiovascular: Normal rate, regular rhythm and normal heart sounds. Exam reveals no gallop and no friction rub.    No murmur heard.  Pulmonary/Chest: Breath sounds normal. No stridor. No respiratory distress. She has no wheezes. She has no rhonchi. She has no rales. She exhibits no tenderness.   Abdominal: Soft. She exhibits no distension and no mass. There is no tenderness. There is no rebound and no guarding.   Musculoskeletal: Normal range of motion.  She exhibits no edema or tenderness.   Neurological: She is alert and oriented to person, place, and time. She has normal strength. No cranial nerve deficit.   Skin: Skin is warm and dry. No rash noted. No erythema.   Psychiatric: She has a normal mood and affect. Her behavior is normal. Judgment and thought content normal.         ED Course   Procedures  Labs Reviewed   CBC W/ AUTO DIFFERENTIAL - Abnormal; Notable for the following components:       Result Value    Mean Corpuscular Volume 79 (*)     Immature Granulocytes 1.0 (*)     Immature Grans (Abs) 0.08 (*)     All other components within normal limits   COMPREHENSIVE METABOLIC PANEL - Abnormal; Notable for the following components:    Potassium 2.8 (*)     CO2 22 (*)     Calcium 8.2 (*)     Albumin 3.2 (*)     AST 47 (*)     All other components within normal limits   URINALYSIS, REFLEX TO URINE CULTURE - Abnormal; Notable for the following components:    Protein, UA 1+ (*)     Ketones, UA 1+ (*)     Urobilinogen, UA 4.0-6.0 (*)     All other components within normal limits    Narrative:     Preferred Collection Type->Urine, Clean Catch   D DIMER, QUANTITATIVE - Abnormal; Notable for the following components:    D-Dimer 2.07 (*)     All other components within normal limits   C-REACTIVE PROTEIN - Abnormal; Notable for the following components:    .6 (*)     All other components within normal limits   URINALYSIS MICROSCOPIC - Abnormal; Notable for the following components:    Bacteria Moderate (*)     Non-Squam Epith 1 (*)     All other components within normal limits    Narrative:     Preferred Collection Type->Urine, Clean Catch   LACTIC ACID, PLASMA   TROPONIN I   PROCALCITONIN   SARS-COV-2 (COVID-19) QUALITATIVE PCR   POCT URINE PREGNANCY     EKG Readings: (Independently Interpreted)   Initial Reading: No STEMI. Rhythm: Sinus Tachycardia. Heart Rate: 105 bpm.   Frequent PVC's.        Imaging Results          CTA Chest Non-Coronary (PE Study) (Final  result)  Result time 04/01/20 00:56:53    Final result by Anastacio Mcdaniel MD (04/01/20 00:56:53)                 Impression:      There is no large central saddle type pulmonary embolus, when accounting for artifact and limitations of the exam there is no additional evidence for pulmonary embolus on this exam.    The lungs demonstrate patchy and mildly confluent infiltrates as well as atelectatic change, hilar adenopathy also noted.      Electronically signed by: Anastacio Mcdaniel  Date:    04/01/2020  Time:    00:56             Narrative:    EXAMINATION:  CTA CHEST NON CORONARY    CLINICAL HISTORY:  Chest pain, acute, PE suspected, intermed prob, positive D-dimer;    TECHNIQUE:  Low dose axial images, sagittal and coronal reformations were obtained from the thoracic inlet to the lung bases following the IV administration of 75 mL of Omnipaque 350.  Contrast timing was optimized to evaluate the pulmonary arteries.  MIP images were performed.    COMPARISON:  June 11, 2018    FINDINGS:  There is no large central saddle type pulmonary embolus, there is artifact present producing heterogeneity particularly of the midsize and smaller and distal pulmonary arterial vascular structures, this diminishes the sensitivity of the exam however when accounting for artifact and abnormal pattern of pulmonary arterial filling defects specific for pulmonary emboli is not seen.    The thoracic aorta demonstrates appropriate opacification.  There are prominent hilar lymph nodes most notably on the right measuring up to approximately 12.6 x 17.5 mm.  There is no pericardial effusion or significant pleural effusion.    Bilateral pulmonary opacities are noted there are bandlike opacities consistent with atelectatic change however there are additional areas consistent with ground-glass, patchy and mildly confluent infiltrates/airspace disease.  There is no evidence for pneumothorax.    There is a suspected small hiatal hernia, limited  imaging of the upper abdomen demonstrates no evidence for acute upper abdominal process.  The visualized osseous structures demonstrate chronic change.                               X-Ray Chest AP Portable (Final result)  Result time 03/31/20 22:12:49    Final result by Memo Pablo MD (03/31/20 22:12:49)                 Impression:      Bibasilar airspace opacities, consistent with pneumonia (viral or bacterial).      Electronically signed by: Memo Pablo MD  Date:    03/31/2020  Time:    22:12             Narrative:    EXAMINATION:  XR CHEST AP PORTABLE    CLINICAL HISTORY:  Chest pain, unspecified    TECHNIQUE:  Single frontal view of the chest was performed.    COMPARISON:  06/12/2019.    FINDINGS:  Monitoring EKG leads are present.  The trachea is unremarkable.  The cardiomediastinal silhouette is within normal limits.  The hemidiaphragms are unremarkable.  There is no evidence of free air beneath the hemidiaphragms.  There are no pleural effusions.  There is no evidence of a pneumothorax.  There is no evidence of pneumomediastinum.  There are bibasilar airspace opacities.  There are minimal degenerative changes in the osseous structures.                                 Medical Decision Making:   History:   Old Medical Records: I decided to obtain old medical records.  Initial Assessment:   49-year-old female presenting with fever, body aches, nausea, vomiting, cough.  Patient reports her son has COVID 19.  Was seen at [facility] 2 days ago for identical symptoms.  Denies any worsening.  Reports symptoms ongoing times 10 days.  Denies shortness of breath.  Notes chest pain with cough.  Reports vomiting yesterday within episode of a small amount of blood tinged vomitus.  Denies dizziness, chest pain at rest, abdominal pain, black or bloody stool.  On exam patient nontoxic appearing, no abdominal tenderness, pain, rebound or guarding.  Vitals with fever, otherwise unremarkable.  O2 sat moderate, 95%.  No  tachypnea or respiratory distress.  We will get labs, chest x-ray, give Toradol, famotidine, and reassess.    Independently Interpreted Test(s):   I have ordered and independently interpreted EKG Reading(s) - see prior notes  Clinical Tests:   Lab Tests: Ordered and Reviewed  Radiological Study: Ordered and Reviewed  Medical Tests: Ordered and Reviewed  ED Management:  Labs reassuring, dimer elevated, K+ low, repleted. CTA without PE, evidence of multifocal PNA c/w suspected COVID19 diagnosis. Given Rx for azithro, though patient already on doxy. Told not to take. Believe she is safe for DC home. Walking pulse ox >95%. No respiratory distress or significant complaints.            Scribe Attestation:   Scribe #1: I performed the above scribed service and the documentation accurately describes the services I performed. I attest to the accuracy of the note.                          Clinical Impression:       ICD-10-CM ICD-9-CM   1. COVID-19 virus infection J22     B97.29    2. Chest pain R07.9 786.50   3. SOB (shortness of breath) R06.02 786.05   4. Multifocal pneumonia J18.9 486         Disposition:   Disposition: Discharged  Condition: Stable     ED Disposition Condition    Discharge Stable        ED Prescriptions     Medication Sig Dispense Start Date End Date Auth. Provider    azithromycin (ZITHROMAX Z-SARAH) 250 MG tablet Take 2 tablets (500 mg total) by mouth once daily for 1 day, THEN 1 tablet (250 mg total) once daily for 4 days. 6 tablet 4/1/2020 4/6/2020 Luke Tran MD        Follow-up Information     Follow up With Specialties Details Why Contact Info    [facility]  Schedule an appointment as soon as possible for a visit   [address] [place] [address]  [phone]      [facility]  As needed, If symptoms worsen [address]  [facility] [address] [phone]                        I, Luke Tran, personally performed  the services described in this documentation. All medical record entries made by the scribe were at my direction and in my presence.  I have reviewed the chart and agree that the record reflects my personal performance and is accurate and complete.               Luke Tran MD  04/01/20 0536

## 2020-04-01 NOTE — TELEPHONE ENCOUNTER
Kylie Felix is a 49 y.o. female with relevant past medical history of Hypertension has a son who called the [employer] hotline with concern about Fever, cough, SOB over the past week. She has been in contact with her son Silverio who tested positive for COVID-19 around 2 weeks ago. She had an episode of hematemesis/hemoptysis yesterday and has been very weak over the past few days. Even having difficulty completing sentences at the moment per son.      Per protocol, I recommended Kylie Felix to go to ED now and the patient opted to go to ED now. Son said he will arrange for sibling to drive her to the ED as soon as he can.      I told Silverio, son of Kylie Felix to call back if she/he has any worsening symptoms, new symptoms, or further questions or concerns. Pt's son verbalized understanding.      Nrfelipe Rudolph    Reason for Disposition   [1] Adult has symptoms of COVID-19 (fever, cough or SOB) AND [2] major community spread where patient lives AND [3] testing not being done for mild symptoms   [1] COVID-19 suspected (e.g., cough, fever, shortness of breath) AND [2] public health department recommends testing   [1] Difficulty breathing occurs AND [2] within 14 days of COVID-19 EXPOSURE (Close Contact)    Additional Information   Negative: SEVERE difficulty breathing (e.g., struggling for each breath, speak in single words, bluish lips)   Negative: Sounds like a life-threatening emergency to the triager   Negative: SEVERE difficulty breathing (e.g., struggling for each breath, speaks in single words)   Negative: Difficult to awaken or acting confused (e.g., disoriented, slurred speech)   Negative: Bluish (or gray) lips or face now   Negative: Shock suspected (e.g., cold/pale/clammy skin, too weak to stand, low BP, rapid pulse)   Negative: Sounds like a life-threatening emergency to the triager   Negative: [1] Adult has symptoms of COVID-19 (fever, cough, or SOB) AND [2] lab test positive   Negative:  [1] Difficulty breathing (shortness of breath) occurs AND [2] onset > 14 days after COVID-19 EXPOSURE (Close Contact) AND [3] no major community spread   Negative: [1] Dry cough occurs AND [2] onset > 14 days after COVID-19 EXPOSURE AND [3] no major community spread   Negative: [1] Wet cough (i.e., white-yellow, yellow, green, or natali colored sputum) AND [2] onset > 14 days after COVID-19 EXPOSURE AND [3] no major community spread   Negative: [1] Common cold symptoms AND [2] onset > 14 days after COVID-19 EXPOSURE AND [3] no major community spread   Negative: Patient sounds very sick or weak to the triager   Negative: [1] Fever (or feeling feverish) OR cough AND [2] within 14 Days of COVID-19 EXPOSURE (Close Contact)   Negative: [1] Fever (or feeling feverish) OR cough occurs AND [2] within 14 days of travel from another country (international travel)   Negative: [1] Fever (or feeling feverish) OR cough occurs AND [2] within 14 days of travel from a city or area with major community spread   Negative: [1] Fever (or feeling feverish) OR cough occurs AND [2] living in area with major community spread AND [3] testing being done for symptoms   Negative: [1] Mild body aches, chills, diarrhea, headache, runny nose, or sore throat AND [2] within 14 days of COVID-Exposure   Negative: [1] COVID-19 EXPOSURE within last 14 days AND [2] NO cough, fever, or breathing difficulty AND [3] exposed person is a healthcare worker who was NOT using all recommended personal protective equipment (i.e., a respirator-N95 mask, eye protection, gloves, and gown)   Negative: [1] COVID-19 EXPOSURE (Close Contact) within last 14 days AND [2] NO cough, fever, or breathing difficulty   Negative: [1] Living in area with major community spread within last 14 days AND [2] NO cough or fever or breathing difficulty   Negative: [1] Travel from city or country with major community spread within last 14 days AND [2] NO cough or fever or  breathing difficulty   Negative: [1] COVID-19 EXPOSURE AND [2] 15 or more days ago AND [3] NO cough or fever or breathing difficulty   Negative: [1] No COVID-19 EXPOSURE BUT [2] living with someone who was exposed and who has no fever or cough   Negative: [1] Caller concerned that exposure to COVID-19 occurred BUT [2] does not meet COVID-19 EXPOSURE criteria from CDC   Negative: COVID -19, questions about   Negative: COVID-19 Prevention and Healthy Living, questions about   Negative: COVID-19 Testing, questions about    Protocols used: CORONAVIRUS (COVID-19) EXPOSURE-A-AH, CORONAVIRUS (COVID-19) DIAGNOSED OR [id number]

## 2020-04-01 NOTE — ED TRIAGE NOTES
Pt reports to ED via personal transportation with c/o nausea, vomiting, diarrhea, frontal headache, productive cough, midsternal chest pain with cough/vomiting, fever,right hip/lower back pain that radiates down leg, and body aches ongoing for 10 days; pt was seen at [facility] on 3/29/20, swabbed for Covid 19, but did not get results back yet; pt has been taking Tylenol for fever; pt reports that her son tested positive for Covid 19 and she has been in close contact with him; pt AAOx4

## 2020-04-03 NOTE — PHYSICIAN QUERY
PT Name: Kylie Felix  MR #: [id number]    Physician Query Form - Cause and Effect Relationship Clarification      CDS/: Flash Rutledge               Contact information:    This form is a permanent document in the medical record.     Query Date: April 3, 2020    By submitting this query, we are merely seeking further clarification of documentation. Please utilize your independent clinical judgment when addressing the question(s) below.    The Medical record contains the following:  Supporting Clinical Findings   Location in record     COVID 19 LAB DETECTED                                                                                                                                                                                      LAB RESULTS   Positive for chills and fever.   Positive for cough (Productive). Positive for chest pain.    Positive for diarrhea, nausea and vomiting (Hematemesis).   Positive for myalgias (Generalized).    IMPRESSION: PNEUMONIA                                                                                                                                                                                          ED NOTES         Provider, please clarify if there is any correlation between pneumonia and positive COVID.           Are the conditions:      [ X ] Due to or associated with each other   [  ] Unrelated to each other   [  ] Other (Please Specify): _________________________   [  ] Clinically Undetermined

## 2020-04-13 ENCOUNTER — HOSPITAL ENCOUNTER (EMERGENCY)
Facility: HOSPITAL | Age: 50
Discharge: HOME | End: 2020-04-14
Attending: EMERGENCY MEDICINE
Payer: MEDICAID

## 2020-04-13 DIAGNOSIS — U07.1 COVID-19 VIRUS INFECTION: Primary | ICD-10-CM

## 2020-04-13 DIAGNOSIS — R07.9 CHEST PAIN: ICD-10-CM

## 2020-04-13 LAB
ALBUMIN SERPL BCP-MCNC: 3.5 G/DL (ref 3.5–5.2)
ALP SERPL-CCNC: 63 U/L (ref 55–135)
ALT SERPL W/O P-5'-P-CCNC: 28 U/L (ref 10–44)
ANION GAP SERPL CALC-SCNC: 13 MMOL/L (ref 8–16)
AST SERPL-CCNC: 31 U/L (ref 10–40)
BASOPHILS # BLD AUTO: 0.03 K/UL (ref 0–0.2)
BASOPHILS NFR BLD: 0.6 % (ref 0–1.9)
BILIRUB SERPL-MCNC: 0.7 MG/DL (ref 0.1–1)
BNP SERPL-MCNC: <10 PG/ML (ref 0–99)
BUN SERPL-MCNC: 9 MG/DL (ref 6–20)
CALCIUM SERPL-MCNC: 9.3 MG/DL (ref 8.7–10.5)
CHLORIDE SERPL-SCNC: 106 MMOL/L (ref 95–110)
CO2 SERPL-SCNC: 25 MMOL/L (ref 23–29)
CREAT SERPL-MCNC: 0.8 MG/DL (ref 0.5–1.4)
CRP SERPL-MCNC: 1.8 MG/L (ref 0–8.2)
D DIMER PPP IA.FEU-MCNC: 5.1 MG/L FEU
DIFFERENTIAL METHOD: NORMAL
EOSINOPHIL # BLD AUTO: 0.1 K/UL (ref 0–0.5)
EOSINOPHIL NFR BLD: 0.9 % (ref 0–8)
ERYTHROCYTE [DISTWIDTH] IN BLOOD BY AUTOMATED COUNT: 13.8 % (ref 11.5–14.5)
EST. GFR  (AFRICAN AMERICAN): >60 ML/MIN/1.73 M^2
EST. GFR  (NON AFRICAN AMERICAN): >60 ML/MIN/1.73 M^2
GLUCOSE SERPL-MCNC: 86 MG/DL (ref 70–110)
HCT VFR BLD AUTO: 39.4 % (ref 37–48.5)
HGB BLD-MCNC: 13 G/DL (ref 12–16)
IMM GRANULOCYTES # BLD AUTO: 0.01 K/UL (ref 0–0.04)
IMM GRANULOCYTES NFR BLD AUTO: 0.2 % (ref 0–0.5)
LYMPHOCYTES # BLD AUTO: 1.8 K/UL (ref 1–4.8)
LYMPHOCYTES NFR BLD: 33.1 % (ref 18–48)
MCH RBC QN AUTO: 27.7 PG (ref 27–31)
MCHC RBC AUTO-ENTMCNC: 33 G/DL (ref 32–36)
MCV RBC AUTO: 84 FL (ref 82–98)
MONOCYTES # BLD AUTO: 0.4 K/UL (ref 0.3–1)
MONOCYTES NFR BLD: 6.7 % (ref 4–15)
NEUTROPHILS # BLD AUTO: 3.2 K/UL (ref 1.8–7.7)
NEUTROPHILS NFR BLD: 58.5 % (ref 38–73)
NRBC BLD-RTO: 0 /100 WBC
PLATELET # BLD AUTO: 284 K/UL (ref 150–350)
PMV BLD AUTO: 10.2 FL (ref 9.2–12.9)
POTASSIUM SERPL-SCNC: 3.7 MMOL/L (ref 3.5–5.1)
PROCALCITONIN SERPL IA-MCNC: <0.02 NG/ML
PROT SERPL-MCNC: 7.1 G/DL (ref 6–8.4)
RBC # BLD AUTO: 4.7 M/UL (ref 4–5.4)
SODIUM SERPL-SCNC: 144 MMOL/L (ref 136–145)
TROPONIN I SERPL DL<=0.01 NG/ML-MCNC: <0.006 NG/ML (ref 0–0.03)
WBC # BLD AUTO: 5.38 K/UL (ref 3.9–12.7)

## 2020-04-13 PROCEDURE — 96360 HYDRATION IV INFUSION INIT: CPT

## 2020-04-13 PROCEDURE — 83880 ASSAY OF NATRIURETIC PEPTIDE: CPT

## 2020-04-13 PROCEDURE — 85379 FIBRIN DEGRADATION QUANT: CPT

## 2020-04-13 PROCEDURE — 84145 PROCALCITONIN (PCT): CPT

## 2020-04-13 PROCEDURE — 93010 ELECTROCARDIOGRAM REPORT: CPT | Mod: ,,, | Performed by: INTERNAL MEDICINE

## 2020-04-13 PROCEDURE — 99285 EMERGENCY DEPT VISIT HI MDM: CPT | Mod: 25

## 2020-04-13 PROCEDURE — 84484 ASSAY OF TROPONIN QUANT: CPT | Mod: 91

## 2020-04-13 PROCEDURE — 86140 C-REACTIVE PROTEIN: CPT

## 2020-04-13 PROCEDURE — 85025 COMPLETE CBC W/AUTO DIFF WBC: CPT

## 2020-04-13 PROCEDURE — 93005 ELECTROCARDIOGRAM TRACING: CPT

## 2020-04-13 PROCEDURE — 80053 COMPREHEN METABOLIC PANEL: CPT

## 2020-04-13 PROCEDURE — 93010 EKG 12-LEAD: ICD-10-PCS | Mod: ,,, | Performed by: INTERNAL MEDICINE

## 2020-04-13 PROCEDURE — 25000003 PHARM REV CODE 250: Performed by: EMERGENCY MEDICINE

## 2020-04-13 RX ORDER — ASPIRIN 325 MG
325 TABLET ORAL
Status: COMPLETED | OUTPATIENT
Start: 2020-04-13 | End: 2020-04-13

## 2020-04-13 RX ADMIN — SODIUM CHLORIDE 500 ML: 9 INJECTION, SOLUTION INTRAVENOUS at 11:04

## 2020-04-13 RX ADMIN — ASPIRIN 325 MG ORAL TABLET 325 MG: 325 PILL ORAL at 09:04

## 2020-04-14 VITALS
TEMPERATURE: 99 F | HEART RATE: 86 BPM | OXYGEN SATURATION: 100 % | HEIGHT: 61 IN | RESPIRATION RATE: 21 BRPM | SYSTOLIC BLOOD PRESSURE: 124 MMHG | DIASTOLIC BLOOD PRESSURE: 72 MMHG | BODY MASS INDEX: 33.07 KG/M2

## 2020-04-14 LAB
SARS-COV-2 RDRP RESP QL NAA+PROBE: POSITIVE
TROPONIN I SERPL DL<=0.01 NG/ML-MCNC: <0.006 NG/ML (ref 0–0.03)

## 2020-04-14 PROCEDURE — U0002 COVID-19 LAB TEST NON-CDC: HCPCS

## 2020-04-14 PROCEDURE — 25500020 PHARM REV CODE 255: Performed by: EMERGENCY MEDICINE

## 2020-04-14 RX ORDER — LEVOFLOXACIN 500 MG/1
750 TABLET, FILM COATED ORAL DAILY
Qty: 7 TABLET | Refills: 0 | Status: SHIPPED | OUTPATIENT
Start: 2020-04-14 | End: 2020-04-21

## 2020-04-14 RX ADMIN — IOHEXOL 75 ML: 350 INJECTION, SOLUTION INTRAVENOUS at 12:04

## 2020-04-14 NOTE — ED PROVIDER NOTES
"Encounter Date: 2020    SCRIBE #1 NOTE: I, Oren Tavarez, am scribing for, and in the presence of,  Luke Tran MD. I have scribed the following portions of the note - Other sections scribed: HPI, ROS, PE, MDM.       History     Chief Complaint   Patient presents with    Chest Pain     started yesterday; midsternal chest tightness, denies radition; c/o nausea, denies vomiting; some shortness of breath; recently tested positive for COVID19     Kylie Felix is an 49 y.o. female presenting to the ED complaining of a sudden onset of chest pain that started yesterday. Patient reports complaint as "sharp" and intermittent with episodes lasting for "a couple of minutes" at a time. Patient reports symptoms of nausea and shortness of breath. Patient denies emesis, diarrhea, headache, fever, chills, abdominal pain, or leg swelling. She reports similar history of symptoms last week, including rhinorrhea, sore throat, and two episodes of hemoptysis, that alleviated. Patient reports chest pain worsens when laying on her side and with movement. She reports prior attempt at treatment with Aspirin. She denies use of hormones or being on birth control pills. Patient reports a past medical history of hypertension, gastroesophageal reflux disease, and cholelithiasis. She denies a past medical history of blood clot. Patient reports a past surgical history of cholecystectomy and hysterectomy. Patient is allergic to Sulfa drugs.  No tobacco, EtOH, or street drug abuse.      The history is provided by the patient.     Review of patient's allergies indicates:   Allergen Reactions    Sulfa (sulfonamide antibiotics) Hives and Swelling     Past Medical History:   Diagnosis Date    Fatty liver     Gall stones 2015    GERD (gastroesophageal reflux disease)     Hypertension      Past Surgical History:   Procedure Laterality Date     SECTION, LOW TRANSVERSE      CYST REMOVAL      HYSTERECTOMY      LAPAROSCOPIC " CHOLECYSTECTOMY N/A 2/11/2019    Procedure: CHOLECYSTECTOMY, LAPAROSCOPIC;  Surgeon: Raphael Ray MD;  Location: [facility] OR;  Service: General;  Laterality: N/A;    TUMOR REMOVAL       Family History   Problem Relation Age of Onset    Breast cancer Neg Hx     Colon cancer Neg Hx     Ovarian cancer Neg Hx      Social History     Tobacco Use    Smoking status: Never Smoker    Smokeless tobacco: Never Used   Substance Use Topics    Alcohol use: No    Drug use: No     Review of Systems   Constitutional: Negative for chills and fever.   HENT: Negative for rhinorrhea and sore throat.    Eyes: Negative for visual disturbance.   Respiratory: Positive for shortness of breath. Negative for cough.    Cardiovascular: Positive for chest pain. Negative for leg swelling.   Gastrointestinal: Positive for nausea. Negative for abdominal pain, diarrhea and vomiting.   Endocrine: Negative for polyuria.   Genitourinary: Negative for dysuria.   Musculoskeletal: Negative for myalgias.   Skin: Negative for color change.   Allergic/Immunologic: Negative for immunocompromised state.   Neurological: Negative for weakness and headaches.   Psychiatric/Behavioral: Negative for confusion.       Physical Exam     Initial Vitals   BP Pulse Resp Temp SpO2   04/13/20 2108 04/13/20 2058 04/13/20 2058 04/13/20 2058 04/13/20 2058   124/75 103 20 98.9 °F (37.2 °C) 99 %      MAP       --                Physical Exam    Nursing note and vitals reviewed.  Constitutional: She appears well-developed and well-nourished. She is not diaphoretic. No distress.   HENT:   Head: Normocephalic and atraumatic.   Eyes: Conjunctivae and EOM are normal. Pupils are equal, round, and reactive to light. No scleral icterus.   Neck: Normal range of motion. Neck supple.   Cardiovascular: Normal rate, regular rhythm, normal heart sounds and intact distal pulses. Exam reveals no gallop and no friction rub.    No murmur heard.  Pulmonary/Chest: Breath sounds normal. No  respiratory distress. She has no wheezes. She has no rhonchi. She has no rales.   Abdominal: Soft. Bowel sounds are normal. She exhibits no distension. There is no tenderness. There is no rebound and no guarding.   Musculoskeletal: Normal range of motion. She exhibits no edema or tenderness.   Neurological: She is alert and oriented to person, place, and time. She has normal strength. No cranial nerve deficit. GCS score is 15. GCS eye subscore is 4. GCS verbal subscore is 5. GCS motor subscore is 6.   Skin: Skin is warm and dry. Capillary refill takes less than 2 seconds. No rash noted.   Psychiatric: She has a normal mood and affect. Her behavior is normal.         ED Course   Procedures  Labs Reviewed   D DIMER, QUANTITATIVE - Abnormal; Notable for the following components:       Result Value    D-Dimer 5.10 (*)     All other components within normal limits   SARS-COV-2 RNA AMPLIFICATION, QUAL - Abnormal; Notable for the following components:    SARS-CoV-2 RNA, Amplification, Qual Positive (*)     All other components within normal limits    Narrative:     What symptom criteria does the patient meet?->Cough   CBC W/ AUTO DIFFERENTIAL   COMPREHENSIVE METABOLIC PANEL   TROPONIN I   B-TYPE NATRIURETIC PEPTIDE   C-REACTIVE PROTEIN   PROCALCITONIN   TROPONIN I     EKG Readings: (Independently Interpreted)   Initial Reading: No STEMI. Rhythm: Normal Sinus Rhythm. Heart Rate: 82. Ectopy: No Ectopy.   No ST segment elevation or depression concerning for acute ischemia.   Diffuse T-wave flattening in the precordial leads       Imaging Results          CTA Chest Non-Coronary (PE Study) (Final result)  Result time 04/14/20 00:52:48    Final result by Kamala Lisa MD (04/14/20 00:52:48)                 Impression:      1. No evidence of pulmonary thromboembolism.  2. Patchy bilateral ground-glass and more focal airspace opacities, similar to minimally decreased in conspicuity from prior examination.  Given the patient's  reported clinical history, findings are concerning for possible underlying infectious process with additional differential considerations including pulmonary edema or non infectious inflammatory etiology.  Prominent hilar lymph nodes, similar to prior examination.  3. Small hiatal hernia and cholecystectomy.      Electronically signed by: Kamala Lisa MD  Date:    04/14/2020  Time:    00:52             Narrative:    EXAMINATION:  CTA CHEST NON CORONARY    CLINICAL HISTORY:  Chest pain, acute, PE suspected, intermed prob, positive D-dimer;    TECHNIQUE:  Low dose axial images, sagittal and coronal reformations were obtained from the thoracic inlet to the lung bases following the IV administration of 75 mL of Omnipaque 350.  Contrast timing was optimized to evaluate the pulmonary arteries.  MIP images were performed.    COMPARISON:  CTA chest 04/01/2020    FINDINGS:  The thoracic aorta maintains normal caliber, contour, and course.  There is no evidence of aneurysmal dilation or dissection. The heart is not enlarged and there is no evidence of pericardial effusion.The esophagus maintains a normal course and caliber.There is no axillary or mediastinal adenopathy.  There are prominent hilar lymph nodes once again identified..    The trachea is midline and the proximal airways are patent.  There are bilateral patchy ground-glass the more focal airspace opacities, which appear similar to slightly decreased in conspicuity from prior examination.  There is additional bibasilar atelectatic change.  No pleural fluid or pneumothorax.    The pulmonary arteries demonstrate no filling defects to suggest pulmonary thromboembolism.    The visualized structures of the upper abdomen demonstrate a small hiatal hernia.  The gallbladder is surgically absent.    Visualized osseous structures demonstrate no acute abnormalities.                               X-Ray Chest AP Portable (Final result)  Result time 04/13/20 23:02:23    Final  result by Alice Oliver MD (04/13/20 23:02:23)                 Impression:      No acute cardiopulmonary process identified.      Electronically signed by: Alice Oliver MD  Date:    04/13/2020  Time:    23:02             Narrative:    EXAMINATION:  XR CHEST AP PORTABLE    CLINICAL HISTORY:  Chest pain, unspecified    TECHNIQUE:  Single frontal view of the chest was performed.    COMPARISON:  03/31/2020.    FINDINGS:  Cardiac silhouette is normal in size.  Lungs are hypoinflated.  No evidence of focal consolidative process, pneumothorax, or significant effusion.  No acute osseous abnormality identified.                                 Medical Decision Making:   Initial Assessment:   49 year old patient with hx of COVID 19 and HTN presenting secondary to chest pain. Patient is at lower risk of ACS due to risk factors and HPI with a heart score of 2. Patient has received an aspirin. Troponins, Cxr, ekg, and labs ordered which showed no acute findings.     [email or web address]    Also considered but less likely:     PE: normal rate, no sob/recent immovilization/surgery/travel/family history. CTA negative  Pneumonia: chest xray negative. No fever or leukoscytosis. CT with scattered ground-glass opacities consistent with COVID 19. Recently completed course of doxy. Coinfection not entirely impossible given >3 week period of time with symptoms. Will start on levaquin.  Tamponade: unlikely due to chest xray and ekg  STEMI: No STEMI on ekg  Dissection: equal pulses bilaterally and no ripping chest pain to the back  Esophageal rupture: no dysphagia or vomiting and chest xray negative for mediastinal air  Arrhythmia: no arrhythmia on ekg  CHF: no fluid overload on Cxr and physical exam  Pneumothorax: bilateral breath sounds and no signs of pneumothorax on chest xray       patient otherwise has normal vital signs.  Well-appearing.  Remainder of workup in exam unremarkable.  Believe she  is safe for discharge home.  Given prescription for Levaquin.    Clinical Tests:   Lab Tests: Reviewed and Ordered  Radiological Study: Reviewed and Ordered  Medical Tests: Reviewed and Ordered            Scribe Attestation:   Scribe #1: I performed the above scribed service and the documentation accurately describes the services I performed. I attest to the accuracy of the note.                          Clinical Impression:     1. COVID-19 virus infection    2. Chest pain            Disposition:   Disposition: Discharged  Condition: Stable     ED Disposition Condition    Discharge Stable        ED Prescriptions     Medication Sig Dispense Start Date End Date Auth. Provider    levoFLOXacin (LEVAQUIN) 500 MG tablet Take 1.5 tablets (750 mg total) by mouth once daily. for 7 days 7 tablet 4/14/2020 4/21/2020 Luke Tran MD        Follow-up Information     Follow up With Specialties Details Why Contact Info    [facility]  Schedule an appointment as soon as possible for a visit   [address] [place] [address]  [phone]      [facility]  As needed, If symptoms worsen [address] [facility] [id number] [phone]                          I, Luke Tran, personally performed the services described in this documentation. All medical record entries made by the scribe were at my direction and in my presence. I have reviewed the chart and agree that the record reflects my personal performance and is accurate and complete.             Luke Tran MD  04/14/20 0224

## 2020-04-14 NOTE — DISCHARGE INSTRUCTIONS
You were seen in the emergency department for chest pain.  Your EKG, labs, exam, are all reassuring.  We are not sure what the cause of your chest pain is however we do not believe it is anything immediately dangerous.  Please follow-up with your primary care provider early this week.  Please return for any new or worsening chest pain, nausea, vomiting, difficulty breathing, coughing up blood, profuse sweating, dizziness, lightheadedness, numbness, weakness, or any other new or worsening concerns.    You were seen in the emergency department for chest pain and a cough.  Your exam and chest x-ray are slightly improved from your last visit.  You have the corona virus.  For most people, the symptoms are mild and get better within a few days.  In a small percentage of cases, symptoms can progress to worsening pneumonia, respiratory failure, and severe illness.  Please monitor yourself carefully.  Please return should you begin to experience worsening shortness of breath, difficulty breathing, dizziness, or you become concerned in any other way.

## 2020-05-23 ENCOUNTER — HOSPITAL ENCOUNTER (EMERGENCY)
Facility: HOSPITAL | Age: 50
Discharge: HOME | End: 2020-05-23
Attending: EMERGENCY MEDICINE
Payer: MEDICAID

## 2020-05-23 VITALS
WEIGHT: 170 LBS | DIASTOLIC BLOOD PRESSURE: 77 MMHG | TEMPERATURE: 99 F | HEART RATE: 77 BPM | HEIGHT: 61 IN | OXYGEN SATURATION: 100 % | RESPIRATION RATE: 20 BRPM | BODY MASS INDEX: 32.1 KG/M2 | SYSTOLIC BLOOD PRESSURE: 122 MMHG

## 2020-05-23 DIAGNOSIS — R94.31 ABNORMAL EKG: ICD-10-CM

## 2020-05-23 DIAGNOSIS — J06.9 URI WITH COUGH AND CONGESTION: ICD-10-CM

## 2020-05-23 DIAGNOSIS — R09.81 NASAL CONGESTION: Primary | ICD-10-CM

## 2020-05-23 LAB
BILIRUB UR QL STRIP: NEGATIVE
CLARITY UR: CLEAR
COLOR UR: YELLOW
GLUCOSE UR QL STRIP: NEGATIVE
HGB UR QL STRIP: NEGATIVE
KETONES UR QL STRIP: NEGATIVE
LEUKOCYTE ESTERASE UR QL STRIP: NEGATIVE
NITRITE UR QL STRIP: NEGATIVE
PH UR STRIP: 5 [PH] (ref 5–8)
PROT UR QL STRIP: NEGATIVE
SP GR UR STRIP: 1.01 (ref 1–1.03)
URN SPEC COLLECT METH UR: NORMAL
UROBILINOGEN UR STRIP-ACNC: NEGATIVE EU/DL

## 2020-05-23 PROCEDURE — 25000003 PHARM REV CODE 250: Performed by: PHYSICIAN ASSISTANT

## 2020-05-23 PROCEDURE — 99284 EMERGENCY DEPT VISIT MOD MDM: CPT

## 2020-05-23 PROCEDURE — 81003 URINALYSIS AUTO W/O SCOPE: CPT

## 2020-05-23 RX ORDER — ACETAMINOPHEN 500 MG
500 TABLET ORAL
Status: COMPLETED | OUTPATIENT
Start: 2020-05-23 | End: 2020-05-23

## 2020-05-23 RX ORDER — BENZONATATE 100 MG/1
100 CAPSULE ORAL 3 TIMES DAILY PRN
Qty: 20 CAPSULE | Refills: 0 | Status: SHIPPED | OUTPATIENT
Start: 2020-05-23 | End: 2020-05-30

## 2020-05-23 RX ORDER — ACETAMINOPHEN 500 MG
500 TABLET ORAL EVERY 4 HOURS PRN
Qty: 20 TABLET | Refills: 0 | Status: SHIPPED | OUTPATIENT
Start: 2020-05-23 | End: 2020-05-28

## 2020-05-23 RX ORDER — FLUTICASONE PROPIONATE 50 MCG
1 SPRAY, SUSPENSION (ML) NASAL 2 TIMES DAILY PRN
Qty: 15 G | Refills: 0 | Status: SHIPPED | OUTPATIENT
Start: 2020-05-23 | End: 2020-06-22

## 2020-05-23 RX ORDER — PANTOPRAZOLE SODIUM 40 MG/1
40 TABLET, DELAYED RELEASE ORAL 2 TIMES DAILY
Qty: 60 TABLET | Refills: 1 | Status: SHIPPED | OUTPATIENT
Start: 2020-05-23 | End: 2020-10-20

## 2020-05-23 RX ORDER — BENZONATATE 100 MG/1
100 CAPSULE ORAL
Status: COMPLETED | OUTPATIENT
Start: 2020-05-23 | End: 2020-05-23

## 2020-05-23 RX ORDER — CETIRIZINE HYDROCHLORIDE 10 MG/1
10 TABLET ORAL DAILY
Qty: 14 TABLET | Refills: 0 | OUTPATIENT
Start: 2020-05-23 | End: 2021-05-09

## 2020-05-23 RX ADMIN — BENZONATATE 100 MG: 100 CAPSULE ORAL at 09:05

## 2020-05-23 RX ADMIN — ACETAMINOPHEN 500 MG: 500 TABLET ORAL at 09:05

## 2020-05-23 NOTE — DISCHARGE INSTRUCTIONS
Take medications as prescribed for your upper respiratory infection. Follow up with Cardiology for further evaluation of your abnormal EKG. Return to ER for worsening symptoms or as needed.

## 2020-05-23 NOTE — ED PROVIDER NOTES
Encounter Date: 2020       History     Chief Complaint   Patient presents with    Nasal Congestion     Patient reports runny nose and cough that started approx 1 week ago. Denies fever. Seen at urgent care this morning, sent here for further eval due to abnormal EKG. Denies chest pain at present time.     Abnormal ECG     Chief complaint:  Nasal congestion, abnormal EKG    HPI:    49-year-old female with history of hypertension, GERD, gallstones, fatty liver disease sent by Urgent Care for evaluation of abnormal EKG.  Patient reports she had single episode of chest pain lasting for 1-2 minutes while seated in watching TV 5 days ago.  She reports the chest pain resolved and denies any episodes since then.  She denies any history of MI however states that her primary care doctor told her that she may have had an MI previously due to abnormal EKG findings.  She denies any chest pain currently, shortness of breath, dizziness, lightheadedness, nausea vomiting, lower extremity pain or swelling, recent travel trauma surgery, history of cancer, hormone therapy, hemoptysis, history of DVT or PE.    Patient was presenting to urgent care for evaluation of 2 day history of nasal congestion, postnasal drip and dry cough and myalgias.  Of note, patient tested positive for COVID-19 1 month ago and completed course of antibiotics for pneumonia.  She reports her symptoms fully resolved except for intermittent right flank pain.  No aggravating factors.  Patient states she has had some urinary frequency.  Denies dysuria, hematuria, urinary urgency, difficulty urinating.         Review of patient's allergies indicates:   Allergen Reactions    Sulfa (sulfonamide antibiotics) Hives and Swelling     Past Medical History:   Diagnosis Date    Fatty liver     Gall stones 2015    GERD (gastroesophageal reflux disease)     Hypertension      Past Surgical History:   Procedure Laterality Date     SECTION, LOW TRANSVERSE       CYST REMOVAL      HYSTERECTOMY      LAPAROSCOPIC CHOLECYSTECTOMY N/A 2/11/2019    Procedure: CHOLECYSTECTOMY, LAPAROSCOPIC;  Surgeon: Raphael Ray MD;  Location: [facility] OR;  Service: General;  Laterality: N/A;    TUMOR REMOVAL       Family History   Problem Relation Age of Onset    Breast cancer Neg Hx     Colon cancer Neg Hx     Ovarian cancer Neg Hx      Social History     Tobacco Use    Smoking status: Never Smoker    Smokeless tobacco: Never Used   Substance Use Topics    Alcohol use: No    Drug use: No     Review of Systems   Constitutional: Negative for chills and fever.   HENT: Positive for congestion and postnasal drip. Negative for ear pain, rhinorrhea and sore throat.    Eyes: Negative for redness.   Respiratory: Positive for cough. Negative for shortness of breath, wheezing and stridor.    Cardiovascular: Positive for chest pain (resolved).   Gastrointestinal: Negative for abdominal pain, diarrhea, nausea and vomiting.   Musculoskeletal: Positive for myalgias. Negative for back pain and neck pain.   Skin: Negative for rash.   Neurological: Negative for dizziness, speech difficulty, weakness, light-headedness and numbness.       Physical Exam     Initial Vitals [05/23/20 0932]   BP Pulse Resp Temp SpO2   138/87 83 18 98.3 °F (36.8 °C) 99 %      MAP       --         Physical Exam    ED Course   Procedures  Labs Reviewed   URINALYSIS, REFLEX TO URINE CULTURE    Narrative:     Preferred Collection Type->Urine, Clean Catch     EKG Readings: (Independently Interpreted)   Initial Reading: No STEMI. Rhythm: Normal Sinus Rhythm. Heart Rate: 81. Ectopy: No Ectopy. T Waves Flipped: V1, V2 and V3. Axis: Left Axis Deviation. Other Impression: T wave flattening       Imaging Results    None          Medical Decision Making:   Initial Assessment:   50 y/o female sent by  for abnormal EKG.  Had single episode of chest pain 5 days ago that resolved.  Denies chest pain, shortness of breath, dizziness,  lightheadedness, nausea vomiting. No significant change when compared to previous EKGs obtained at this facility. Patient presented to urgent care due to dry cough, postnasal drip and nasal congestion as well as generalized myalgias.  She denies any fever, nausea vomiting, diarrhea.  She does have positive for COVID-19 last month and reports resolution of her symptoms since that time.  Patient is afebrile, nontoxic appearing in no distress.  She is not hypoxic.  Lung exam is normal.  No chest wall tenderness.  Patient reports she has had intermittent right flank pain since diagnosis with COVID-19.  Considered but doubt pneumonia given normal lung exam and normal vital signs.  She reports having urinary frequency.  UA is negative for infection or hematuria.  No history of kidney stones.  Abdomen is soft nontender.  Doubt cholecystitis or acute abdomen.  She is perc negative.  Will discharge patient with medications for viral URI with cough and have her follow up with primary care and Cardiology regarding abnormal EKG.  Will have her return to the emergency department for worsening symptoms or as needed.    Discussed pt with Dr. Owen who agrees with assessment and plan.                                      Clinical Impression:       ICD-10-CM ICD-9-CM   1. Nasal congestion R09.81 478.19   2. URI with cough and congestion J06.9 465.9   3. Abnormal EKG R94.31 794.31             ED Disposition Condition    Discharge Stable        ED Prescriptions     Medication Sig Dispense Start Date End Date Auth. Provider    benzonatate (TESSALON) 100 MG capsule Take 1 capsule (100 mg total) by mouth 3 (three) times daily as needed for Cough. 20 capsule 5/23/2020 5/30/2020 Sonja Lombardi PA-C    acetaminophen (TYLENOL) 500 MG tablet Take 1 tablet (500 mg total) by mouth every 4 (four) hours as needed. 20 tablet 5/23/2020 5/28/2020 Sonja Lombardi PA-C    fluticasone propionate (FLONASE) 50 mcg/actuation nasal spray 1  spray (50 mcg total) by Each Nostril route 2 (two) times daily as needed for Rhinitis. 15 g 5/23/2020 6/22/2020 Sonja Lombardi PA-C    cetirizine (ZYRTEC) 10 MG tablet Take 1 tablet (10 mg total) by mouth once daily. for 14 days 14 tablet 5/23/2020 6/6/2020 Sonja Lombardi PA-C    pantoprazole (PROTONIX) 40 MG tablet Take 1 tablet (40 mg total) by mouth 2 (two) times daily. 60 tablet 5/23/2020 6/22/2020 Sonja Lombardi PA-C        Follow-up Information     Follow up With Specialties Details Why Contact Info    [facility]  Schedule an appointment as soon as possible for a visit in 2 days for follow up [address] [place] [address]  [phone]      Miguel Laurent MD Cardiology Schedule an appointment as soon as possible for a visit in 1 day for follow up with Cardiology [address]  [address]  [facility] [address]  [phone]      Approved Provider By Your Insurance Company  Schedule an appointment as soon as possible for a visit in 2 days Call number on your insurance card to receive a full list of Cardiology providers in your area     [facility] Surgical Oncology, Orthopedic Surgery, Genetics, Physical Medicine and Rehabilitation, Occupational Therapy, Radiology Schedule an appointment as soon as possible for a visit in 2 days for follow up [address] [facility] [address]  [phone]      [facility] Go to  As needed, If symptoms worsen [address] [facility] [id number] [phone]                                     Sonja Lombardi PA-C  05/23/20 1055

## 2020-05-23 NOTE — ED TRIAGE NOTES
Patient reports being sent over by  due to abnormal EKG. Went to UC secondary to nasal congestion and increased cough.

## 2020-06-22 ENCOUNTER — HOSPITAL ENCOUNTER (EMERGENCY)
Facility: HOSPITAL | Age: 50
Discharge: HOME | End: 2020-06-22
Attending: EMERGENCY MEDICINE
Payer: MEDICAID

## 2020-06-22 VITALS
DIASTOLIC BLOOD PRESSURE: 78 MMHG | TEMPERATURE: 98 F | BODY MASS INDEX: 33.04 KG/M2 | RESPIRATION RATE: 18 BRPM | HEART RATE: 84 BPM | SYSTOLIC BLOOD PRESSURE: 112 MMHG | WEIGHT: 175 LBS | HEIGHT: 61 IN | OXYGEN SATURATION: 97 %

## 2020-06-22 DIAGNOSIS — M54.9 BACK PAIN, UNSPECIFIED BACK LOCATION, UNSPECIFIED BACK PAIN LATERALITY, UNSPECIFIED CHRONICITY: ICD-10-CM

## 2020-06-22 DIAGNOSIS — R07.9 CHEST PAIN: ICD-10-CM

## 2020-06-22 DIAGNOSIS — R79.89 ELEVATED D-DIMER: Primary | ICD-10-CM

## 2020-06-22 LAB
ALBUMIN SERPL BCP-MCNC: 3.7 G/DL (ref 3.5–5.2)
ALP SERPL-CCNC: 61 U/L (ref 55–135)
ALT SERPL W/O P-5'-P-CCNC: 25 U/L (ref 10–44)
ANION GAP SERPL CALC-SCNC: 12 MMOL/L (ref 8–16)
AST SERPL-CCNC: 28 U/L (ref 10–40)
B-HCG UR QL: NEGATIVE
BASOPHILS # BLD AUTO: 0.04 K/UL (ref 0–0.2)
BASOPHILS NFR BLD: 0.4 % (ref 0–1.9)
BILIRUB SERPL-MCNC: 0.6 MG/DL (ref 0.1–1)
BILIRUB UR QL STRIP: NEGATIVE
BNP SERPL-MCNC: <10 PG/ML (ref 0–99)
BUN SERPL-MCNC: 13 MG/DL (ref 6–20)
CALCIUM SERPL-MCNC: 9.1 MG/DL (ref 8.7–10.5)
CHLORIDE SERPL-SCNC: 102 MMOL/L (ref 95–110)
CLARITY UR: CLEAR
CO2 SERPL-SCNC: 26 MMOL/L (ref 23–29)
COLOR UR: YELLOW
CREAT SERPL-MCNC: 0.9 MG/DL (ref 0.5–1.4)
CTP QC/QA: YES
D DIMER PPP IA.FEU-MCNC: 2.28 MG/L FEU
DIFFERENTIAL METHOD: NORMAL
EOSINOPHIL # BLD AUTO: 0.2 K/UL (ref 0–0.5)
EOSINOPHIL NFR BLD: 2.3 % (ref 0–8)
ERYTHROCYTE [DISTWIDTH] IN BLOOD BY AUTOMATED COUNT: 13.2 % (ref 11.5–14.5)
EST. GFR  (AFRICAN AMERICAN): >60 ML/MIN/1.73 M^2
EST. GFR  (NON AFRICAN AMERICAN): >60 ML/MIN/1.73 M^2
GLUCOSE SERPL-MCNC: 114 MG/DL (ref 70–110)
GLUCOSE SERPL-MCNC: 115 MG/DL (ref 70–110)
GLUCOSE UR QL STRIP: NEGATIVE
HCT VFR BLD AUTO: 38.6 % (ref 37–48.5)
HGB BLD-MCNC: 13.3 G/DL (ref 12–16)
HGB UR QL STRIP: NEGATIVE
IMM GRANULOCYTES # BLD AUTO: 0.02 K/UL (ref 0–0.04)
IMM GRANULOCYTES NFR BLD AUTO: 0.2 % (ref 0–0.5)
KETONES UR QL STRIP: NEGATIVE
LEUKOCYTE ESTERASE UR QL STRIP: NEGATIVE
LIPASE SERPL-CCNC: 38 U/L (ref 4–60)
LYMPHOCYTES # BLD AUTO: 3.8 K/UL (ref 1–4.8)
LYMPHOCYTES NFR BLD: 41.9 % (ref 18–48)
MCH RBC QN AUTO: 28.5 PG (ref 27–31)
MCHC RBC AUTO-ENTMCNC: 34.5 G/DL (ref 32–36)
MCV RBC AUTO: 83 FL (ref 82–98)
MONOCYTES # BLD AUTO: 0.6 K/UL (ref 0.3–1)
MONOCYTES NFR BLD: 6.1 % (ref 4–15)
NEUTROPHILS # BLD AUTO: 4.4 K/UL (ref 1.8–7.7)
NEUTROPHILS NFR BLD: 49.1 % (ref 38–73)
NITRITE UR QL STRIP: NEGATIVE
NRBC BLD-RTO: 0 /100 WBC
PH UR STRIP: 6 [PH] (ref 5–8)
PLATELET # BLD AUTO: 240 K/UL (ref 150–350)
PMV BLD AUTO: 10.3 FL (ref 9.2–12.9)
POTASSIUM SERPL-SCNC: 3.3 MMOL/L (ref 3.5–5.1)
PROT SERPL-MCNC: 7.1 G/DL (ref 6–8.4)
PROT UR QL STRIP: NEGATIVE
RBC # BLD AUTO: 4.66 M/UL (ref 4–5.4)
SODIUM SERPL-SCNC: 140 MMOL/L (ref 136–145)
SP GR UR STRIP: 1.01 (ref 1–1.03)
TROPONIN I SERPL DL<=0.01 NG/ML-MCNC: 0.01 NG/ML (ref 0–0.03)
TROPONIN I SERPL DL<=0.01 NG/ML-MCNC: <0.006 NG/ML (ref 0–0.03)
URN SPEC COLLECT METH UR: NORMAL
UROBILINOGEN UR STRIP-ACNC: NEGATIVE EU/DL
WBC # BLD AUTO: 8.96 K/UL (ref 3.9–12.7)

## 2020-06-22 PROCEDURE — 85025 COMPLETE CBC W/AUTO DIFF WBC: CPT

## 2020-06-22 PROCEDURE — 25500020 PHARM REV CODE 255: Performed by: EMERGENCY MEDICINE

## 2020-06-22 PROCEDURE — 84484 ASSAY OF TROPONIN QUANT: CPT

## 2020-06-22 PROCEDURE — 93010 ELECTROCARDIOGRAM REPORT: CPT | Mod: ,,, | Performed by: INTERNAL MEDICINE

## 2020-06-22 PROCEDURE — 81003 URINALYSIS AUTO W/O SCOPE: CPT

## 2020-06-22 PROCEDURE — 63600175 PHARM REV CODE 636 W HCPCS: Performed by: EMERGENCY MEDICINE

## 2020-06-22 PROCEDURE — 82962 GLUCOSE BLOOD TEST: CPT

## 2020-06-22 PROCEDURE — 85379 FIBRIN DEGRADATION QUANT: CPT

## 2020-06-22 PROCEDURE — 83690 ASSAY OF LIPASE: CPT

## 2020-06-22 PROCEDURE — 83880 ASSAY OF NATRIURETIC PEPTIDE: CPT

## 2020-06-22 PROCEDURE — 96374 THER/PROPH/DIAG INJ IV PUSH: CPT | Mod: 59

## 2020-06-22 PROCEDURE — 93005 ELECTROCARDIOGRAM TRACING: CPT

## 2020-06-22 PROCEDURE — 81025 URINE PREGNANCY TEST: CPT | Performed by: EMERGENCY MEDICINE

## 2020-06-22 PROCEDURE — 25000003 PHARM REV CODE 250: Performed by: EMERGENCY MEDICINE

## 2020-06-22 PROCEDURE — 99285 EMERGENCY DEPT VISIT HI MDM: CPT | Mod: 25

## 2020-06-22 PROCEDURE — 80053 COMPREHEN METABOLIC PANEL: CPT

## 2020-06-22 PROCEDURE — 93010 EKG 12-LEAD: ICD-10-PCS | Mod: ,,, | Performed by: INTERNAL MEDICINE

## 2020-06-22 RX ORDER — MAG HYDROX/ALUMINUM HYD/SIMETH 200-200-20
5 SUSPENSION, ORAL (FINAL DOSE FORM) ORAL
Status: COMPLETED | OUTPATIENT
Start: 2020-06-22 | End: 2020-06-22

## 2020-06-22 RX ORDER — KETOROLAC TROMETHAMINE 30 MG/ML
30 INJECTION, SOLUTION INTRAMUSCULAR; INTRAVENOUS
Status: COMPLETED | OUTPATIENT
Start: 2020-06-22 | End: 2020-06-22

## 2020-06-22 RX ORDER — LIDOCAINE HYDROCHLORIDE 20 MG/ML
5 SOLUTION OROPHARYNGEAL
Status: COMPLETED | OUTPATIENT
Start: 2020-06-22 | End: 2020-06-22

## 2020-06-22 RX ORDER — ASPIRIN 81 MG/1
81 TABLET ORAL DAILY
COMMUNITY

## 2020-06-22 RX ORDER — SULINDAC 150 MG/1
150 TABLET ORAL 2 TIMES DAILY
Qty: 10 TABLET | Refills: 0 | Status: SHIPPED | OUTPATIENT
Start: 2020-06-22 | End: 2020-06-27

## 2020-06-22 RX ADMIN — ALUMINUM HYDROXIDE, MAGNESIUM HYDROXIDE, AND SIMETHICONE 5 ML: 200; 200; 20 SUSPENSION ORAL at 06:06

## 2020-06-22 RX ADMIN — IOHEXOL 75 ML: 350 INJECTION, SOLUTION INTRAVENOUS at 08:06

## 2020-06-22 RX ADMIN — LIDOCAINE HYDROCHLORIDE 5 ML: 20 SOLUTION ORAL; TOPICAL at 06:06

## 2020-06-22 RX ADMIN — KETOROLAC TROMETHAMINE 30 MG: 30 INJECTION, SOLUTION INTRAMUSCULAR at 07:06

## 2020-06-22 NOTE — ED TRIAGE NOTES
Pt comes to the ED reports Chest pain that started 2 hrs prior to come to the ED. Reports taking 81 mg of aspirin at home without relief. Pt states that she went to see a cardiologist 2 weeks ago for abnormal EKG.

## 2020-06-22 NOTE — ED PROVIDER NOTES
Encounter Date: 2020       History     Chief Complaint   Patient presents with    Chest Pain     C/o midsternal aching chest pain that started at 0252 this morning     48 yo female presents via family with acute onset midsternal pressure-like chest pain. Patient was in her usual state of health last night. She ate normally around 8pm. Around 2:50am, patient woke up and rapidly developed substernal nonradiating chest pain. Patient also had back pain while en route to the hospital. No diaphoresis, nausea, vomiting, shortness of breath. No fevers/sweats.     Patient has been seen here several times in the past for chest pain.     PMH: HTN, GERD, fatty liver, gallstones  PSH: laparoscopic cholecystectomy, csection, hysterectomy, tumor removal, cyst removal        Review of patient's allergies indicates:   Allergen Reactions    Sulfa (sulfonamide antibiotics) Hives and Swelling     Past Medical History:   Diagnosis Date    Fatty liver     Gall stones 2015    GERD (gastroesophageal reflux disease)     Hypertension      Past Surgical History:   Procedure Laterality Date     SECTION, LOW TRANSVERSE      CYST REMOVAL      HYSTERECTOMY      LAPAROSCOPIC CHOLECYSTECTOMY N/A 2019    Procedure: CHOLECYSTECTOMY, LAPAROSCOPIC;  Surgeon: Raphael Ray MD;  Location: [facility];  Service: General;  Laterality: N/A;    TUMOR REMOVAL       Family History   Problem Relation Age of Onset    Breast cancer Neg Hx     Colon cancer Neg Hx     Ovarian cancer Neg Hx      Social History     Tobacco Use    Smoking status: Never Smoker    Smokeless tobacco: Never Used   Substance Use Topics    Alcohol use: No    Drug use: No     Review of Systems   Constitutional: Negative for fever.   HENT: Negative for rhinorrhea and sore throat.    Eyes: Negative for visual disturbance.   Respiratory: Negative for cough and shortness of breath.    Cardiovascular: Positive for chest pain.   Gastrointestinal: Negative  for abdominal pain.   Genitourinary: Negative for dysuria.   Musculoskeletal: Negative for neck stiffness.   Skin: Negative for rash.   Neurological: Negative for syncope.       Physical Exam     Initial Vitals [06/22/20 0325]   BP Pulse Resp Temp SpO2   (!) 165/74 (!) 114 18 98.2 °F (36.8 °C) 100 %      MAP       --         Physical Exam    Nursing note and vitals reviewed.  Constitutional: She appears well-developed and well-nourished. She is not diaphoretic.   Awake, alert middle-aged female.   HENT:   Head: Normocephalic and atraumatic.   Eyes: Conjunctivae and EOM are normal. Pupils are equal, round, and reactive to light.   Neck: Normal range of motion. Neck supple.   Cardiovascular: Regular rhythm, normal heart sounds and intact distal pulses.   No murmur heard.  Tachycardic, regular.   Pulmonary/Chest: Breath sounds normal. No respiratory distress. She has no wheezes. She has no rhonchi. She has no rales.   Abdominal: Soft. There is no abdominal tenderness.   Musculoskeletal: Normal range of motion. No tenderness or edema.   Neurological: She is alert and oriented to person, place, and time. She has normal strength.   Moving all extremities.   Skin: Skin is warm and dry. No erythema. No pallor.   Psychiatric: She has a normal mood and affect.         ED Course   Procedures  Labs Reviewed   COMPREHENSIVE METABOLIC PANEL - Abnormal; Notable for the following components:       Result Value    Potassium 3.3 (*)     Glucose 114 (*)     All other components within normal limits   D DIMER, QUANTITATIVE - Abnormal; Notable for the following components:    D-Dimer 2.28 (*)     All other components within normal limits   B-TYPE NATRIURETIC PEPTIDE   CBC W/ AUTO DIFFERENTIAL   LIPASE   TROPONIN I   TROPONIN I   URINALYSIS, REFLEX TO URINE CULTURE    Narrative:     Preferred Collection Type->Urine, Clean Catch  Specimen Source->Urine   POCT URINE PREGNANCY     EKG Readings: (Independently Interpreted)   03:23: Sinus  tach, . Normal axis. No ectopy. No STEMI.      ECG Results          EKG 12-lead (Final result)  Result time 06/22/20 10:48:37    Final result by Josiah, Lab In [facility] (06/22/20 10:48:37)                 Narrative:    Test Reason : R07.9,    Vent. Rate : 108 BPM     Atrial Rate : 108 BPM     P-R Int : 198 ms          QRS Dur : 068 ms      QT Int : 328 ms       P-R-T Axes : 044 045 036 degrees     QTc Int : 439 ms    Sinus tachycardia  Cannot rule out Anterior infarct (cited on or before 31-MAR-2020)  Abnormal ECG  When compared with ECG of 23-MAY-2020 09:35,  No significant change was found    Confirmed by Smooth Bradley MD ([id number]) on 6/22/2020 10:48:31 AM    Referred By: [id number]   SELF           Confirmed By:Smooth Bradley MD                            Imaging Results          CTA Chest Abdomen Pelvis (Final result)  Result time 06/22/20 09:27:58    Final result by Noé Romero MD (06/22/20 09:27:58)                 Impression:      1. No CTA findings to suggest aortic dissection.  2. Patchy pulmonary parenchymal ground-glass attenuation within the bilateral lower lobes, slightly progressed when compared to the previous exam favored to represent atelectasis or edema.  An evolving infectious process is possible but is felt to be less likely.  No consolidation.  No pleural effusion.  3. Small sliding-type hiatal hernia.  4. Few scattered colonic diverticula without associated inflammatory changes.  5. Status post cholecystectomy.  6. Low-lying pelvic organs, a finding that can be seen in the setting of pelvic floor dysfunction.      Electronically signed by: Noé Romero MD  Date:    06/22/2020  Time:    09:27             Narrative:    EXAMINATION:  CTA CHEST ABDOMEN PELVIS    CLINICAL HISTORY:  chest and back pain, r/o dissection;    TECHNIQUE:  Low dose axial images, sagittal and coronal reformations were obtained from the thoracic inlet to the pubic symphysis following the IV administration  of 75 mL of Omnipaque 350.  Oral contrast was not administered.    COMPARISON:  CTA 04/14/2020.    FINDINGS:  Low-attenuation nodule noted within the right thyroid lobe.    There is a left-sided aortic arch with 3 branch vessels.  The thoracic aorta tapers normally without atherosclerotic calcification.  No dissection.    Pulmonary arteries distribute normally.  No central filling defects to suggest a thromboembolus.    Heart is normal in size.  There are 4 pulmonary veins.  No pericardial effusion.    No axillary, supraclavicular or mediastinal lymphadenopathy.  There are a few slightly prominent right hilar lymph nodes.    The esophagus is normal in caliber and course.  There is a small sliding-type hiatal hernia.    Trachea and proximal airways are patent.    Lung volumes are normal and symmetric.  No mass, consolidation, pneumothorax or pleural effusions.  Patchy ground-glass attenuation noted within the dependent portions of the bilateral lower lobes, similar when compared to the previous exam.  No consolidation.  No pneumothorax or pleural effusions.  No bronchiectasis.    The liver is at the upper limit of normal in size.  Hepatic parenchyma demonstrates homogeneous enhancement without focal lesions.  No intrahepatic bile duct dilatation.  Portal vasculature is patent.    Gallbladder is surgically absent.  Common bile duct is at the upper limit of normal in caliber.    There is a small sliding-type hiatal hernia.  The stomach is otherwise unremarkable.    Duodenum, spleen, and adrenal glands are within normal limits.    Kidneys are normal in size and location.  No cortical enhancing lesions.  No nephrolithiasis.  No hydronephrosis or hydroureter.  Bladder is fluid filled and distended.    The uterus is surgically absent.  Ovaries appear within normal limits.  No pelvic free fluid.  There are low-lying pelvic organs, suggesting pelvic floor relaxation.    The small bowel is normal in caliber.  There are a few  colonic diverticuli.  The appendix is normal.  No obstruction or inflammatory changes.    The aorta tapers normally.  Celiac artery, SMA, bilateral renal arteries and KIRAN are patent without focal high-grade stenosis or occlusion.    Subcutaneous soft tissues are within normal limits.    There are degenerative changes of the spine.  No suspicious lytic or blastic lesions.                               X-Ray Chest 1 View (Final result)  Result time 06/22/20 03:59:51    Final result by Maximino Lisa MD (06/22/20 03:59:51)                 Impression:      No convincing radiographic evidence of acute intrathoracic process on this single view.      Electronically signed by: Maximino Lisa MD  Date:    06/22/2020  Time:    03:59             Narrative:    EXAMINATION:  XR CHEST 1 VIEW    CLINICAL HISTORY:  Chest pain, unspecified    TECHNIQUE:  Single frontal view of the chest was performed.    COMPARISON:  Chest radiograph 04/13/2020, CTA chest 04/14/2020    FINDINGS:  Cardiac monitoring leads overlie the chest.  The cardiomediastinal silhouette appears within normal limits.  No large confluent airspace consolidation or pleural effusion identified.  There is no evidence of pneumothorax.  Osseous structures appear grossly intact.                              X-Rays:   Independently Interpreted Readings:   Other Readings:  CXR NAD    Medical Decision Making:   History:   Old Medical Records: I decided to obtain old medical records.  Old Records Summarized: records from previous admission(s) and records from clinic visits.  Initial Assessment:   49 y.o. female with chest pain, back pain.  Differential Diagnosis:   Ddx includes ACS, CHF, PE, dissection, symptomatic anemia, GERD, PUD, other.  Independently Interpreted Test(s):   I have ordered and independently interpreted X-rays - see prior notes.  I have ordered and independently interpreted EKG Reading(s) - see prior notes  Clinical Tests:   Lab Tests: Reviewed and  Ordered  Radiological Study: Ordered and Reviewed  Medical Tests: Ordered and Reviewed  ED Management:  UPT negative.    EKG no STEMI.    CXR NAD.     CBC, CMP, troponin, BNP, lipase reassuring.    Ddimer 2.28, elevated.    Patient is awaiting CTA chest and repeat troponin. Dispo pending these tests.    If negative, likely d/c to cardiology f/u as previously scheduled by [facility].    Discussed with PEG Avila and Dr. Fransisco Owen of day shift.                        ED Course as of Jun 22 1506   Mon Jun 22, 2020   0632 Waiting on repeat troponin.     [VC]   0634 SBAR taken from MD Brennen.    [VC]   0739 Neg ua.   Urinalysis, Reflex to Urine Culture Urine, Clean Catch [VC]   0934 1. No CTA findings to suggest aortic dissection.  2. Patchy pulmonary parenchymal ground-glass attenuation within the bilateral lower lobes, slightly progressed when compared to the previous exam favored to represent atelectasis or edema.  An evolving infectious process is possible but is felt to be less likely.  No consolidation.  No pleural effusion.  3. Small sliding-type hiatal hernia.  4. Few scattered colonic diverticula without associated inflammatory changes.  5. Status post cholecystectomy.  6. Low-lying pelvic organs, a finding that can be seen in the setting of pelvic floor dysfunction.      CTA Chest Abdomen Pelvis [VC]      ED Course User Index  [VC] Lan Avila DNP     Patient was discharged home in good condition to follow-up with primary care and should return for any worsening or changes in condition.  A prescription for anti-inflammatory medication was given and the patient is referred to follow-up at the [facility] care [facility].  See above for analyses of radiology, labs, and events during pt's visit and direct actions taken. Symptomatic therapies and return precautions on AVS.   Medication choices were made after reviewing allergies, medications, history, available laboratories. See below for  discharge prescriptions if any and disposition.        Clinical Impression:       ICD-10-CM ICD-9-CM   1. Elevated d-dimer  R79.89 790.92   2. Chest pain  R07.9 786.50   3. Back pain, unspecified back location, unspecified back pain laterality, unspecified chronicity  M54.9 724.5             ED Disposition Condition    Discharge Stable        ED Prescriptions     Medication Sig Dispense Start Date End Date Auth. Provider    sulindac (CLINORIL) 150 MG tablet Take 1 tablet (150 mg total) by mouth 2 (two) times daily. for 5 days 10 tablet 6/22/2020 6/27/2020 Lan Avila DNP        Follow-up Information     Follow up With Specialties Details Why Contact Info    [facility]  Schedule an appointment as soon as possible for a visit   [address] [place] [address]  [phone]                                       Lan Avila DNP  06/22/20 1500

## 2020-06-22 NOTE — DISCHARGE INSTRUCTIONS
You have been given an anti-inflammatory (clinoril (sulindac)) prescription.  While taking this medication, do not use ibuprofen, motrin, advil, aleve, or any other anti-inflammatory. Return to the Emergency department for any worsening or failure to improve, otherwise follow up with your primary care provider.   Do not take medications prescribed for home use until at least 8h after injections given in ED.

## 2020-06-29 ENCOUNTER — HOSPITAL ENCOUNTER (EMERGENCY)
Facility: HOSPITAL | Age: 50
Discharge: HOME | End: 2020-06-29
Attending: EMERGENCY MEDICINE
Payer: MEDICAID

## 2020-06-29 VITALS
BODY MASS INDEX: 33.99 KG/M2 | TEMPERATURE: 99 F | HEART RATE: 80 BPM | WEIGHT: 180 LBS | DIASTOLIC BLOOD PRESSURE: 70 MMHG | HEIGHT: 61 IN | RESPIRATION RATE: 18 BRPM | SYSTOLIC BLOOD PRESSURE: 123 MMHG | OXYGEN SATURATION: 100 %

## 2020-06-29 DIAGNOSIS — M25.562 KNEE PAIN, LEFT: Primary | ICD-10-CM

## 2020-06-29 PROCEDURE — 25000003 PHARM REV CODE 250: Mod: ER | Performed by: PHYSICIAN ASSISTANT

## 2020-06-29 PROCEDURE — 99284 EMERGENCY DEPT VISIT MOD MDM: CPT | Mod: 25,ER

## 2020-06-29 RX ORDER — METHOCARBAMOL 500 MG/1
1000 TABLET, FILM COATED ORAL 3 TIMES DAILY
Qty: 30 TABLET | Refills: 0 | Status: SHIPPED | OUTPATIENT
Start: 2020-06-29 | End: 2020-07-04

## 2020-06-29 RX ORDER — NAPROXEN 250 MG/1
500 TABLET ORAL
Status: COMPLETED | OUTPATIENT
Start: 2020-06-29 | End: 2020-06-29

## 2020-06-29 RX ORDER — CYCLOBENZAPRINE HCL 10 MG
10 TABLET ORAL
Status: COMPLETED | OUTPATIENT
Start: 2020-06-29 | End: 2020-06-29

## 2020-06-29 RX ORDER — LIDOCAINE 50 MG/G
1 PATCH TOPICAL DAILY
Qty: 15 PATCH | Refills: 0 | Status: SHIPPED | OUTPATIENT
Start: 2020-06-29

## 2020-06-29 RX ORDER — NAPROXEN 500 MG/1
500 TABLET ORAL 2 TIMES DAILY WITH MEALS
Qty: 14 TABLET | Refills: 0 | Status: SHIPPED | OUTPATIENT
Start: 2020-06-29 | End: 2020-07-06

## 2020-06-29 RX ADMIN — NAPROXEN 500 MG: 250 TABLET ORAL at 01:06

## 2020-06-29 RX ADMIN — CYCLOBENZAPRINE 10 MG: 10 TABLET, FILM COATED ORAL at 01:06

## 2020-06-29 NOTE — DISCHARGE INSTRUCTIONS
Please take new medications as directed and follow discharge instructions provided.  Please make sure to follow-up with your PCP and Orthopedics to discuss today's emergency department visit and for further evaluation and management.  Please return to the emergency department immediately if her symptoms worsen or you develop any additional concerning symptoms.

## 2020-06-29 NOTE — ED PROVIDER NOTES
"Encounter Date: 2020    SCRIBE #1 NOTE: I, Fatemeh Marie, am scribing for, and in the presence of,  JESSICA Herrera. I have scribed the following portions of the note - Other sections scribed: HPI, ROS, PE.       History     Chief Complaint   Patient presents with    Knee Pain     pt c/o left sided knee pain states that sat and today her knee popped out of place and back into place. pt states now she is having pain in the left knee.      Kylie Felix is a 49 y.o. female who presents to the ED complaining of left knee pain x 3 days. Patient reports that it "popped," but denies any trauma, fall, or injury. She has never had knee problems in the past, but reports joint pain to her fingers about 1 month ago. PMHx includes hypertension, GERD, gallbladder issues.    The history is provided by the patient. No  was used.     Review of patient's allergies indicates:   Allergen Reactions    Sulfa (sulfonamide antibiotics) Hives and Swelling     Past Medical History:   Diagnosis Date    Fatty liver     Gall stones 2015    GERD (gastroesophageal reflux disease)     Hypertension      Past Surgical History:   Procedure Laterality Date     SECTION, LOW TRANSVERSE      CYST REMOVAL      HYSTERECTOMY      LAPAROSCOPIC CHOLECYSTECTOMY N/A 2019    Procedure: CHOLECYSTECTOMY, LAPAROSCOPIC;  Surgeon: Raphael Ray MD;  Location: [facility] OR;  Service: General;  Laterality: N/A;    TUMOR REMOVAL       Family History   Problem Relation Age of Onset    Breast cancer Neg Hx     Colon cancer Neg Hx     Ovarian cancer Neg Hx      Social History     Tobacco Use    Smoking status: Never Smoker    Smokeless tobacco: Never Used   Substance Use Topics    Alcohol use: No    Drug use: No     Review of Systems   Constitutional: Negative for chills and fever.   HENT: Negative for congestion and sore throat.    Eyes: Negative for visual disturbance.   Respiratory: Negative for cough and " shortness of breath.    Cardiovascular: Negative for chest pain.   Gastrointestinal: Negative for abdominal pain, blood in stool, diarrhea, nausea and vomiting.        No melena.   Genitourinary: Negative for dysuria, flank pain and hematuria.   Musculoskeletal: Positive for arthralgias.   Skin: Negative for rash and wound.   Neurological: Negative for dizziness, speech difficulty, weakness, numbness and headaches.   Psychiatric/Behavioral: Negative for confusion.   All other systems reviewed and are negative.      Physical Exam     Initial Vitals [06/29/20 1131]   BP Pulse Resp Temp SpO2   118/83 93 18 97.7 °F (36.5 °C) 98 %      MAP       --         Physical Exam    Nursing note and vitals reviewed.  Constitutional: She appears well-developed and well-nourished.   HENT:   Head: Normocephalic and atraumatic.   Eyes: Conjunctivae are normal.   Neck: Normal range of motion. Neck supple.   Cardiovascular: Normal rate, regular rhythm and normal heart sounds. Exam reveals no gallop and no friction rub.    No murmur heard.  Pulmonary/Chest: Breath sounds normal. No respiratory distress. She has no wheezes. She has no rhonchi. She has no rales.   Abdominal: Soft. There is no abdominal tenderness.   Musculoskeletal: Normal range of motion. No edema.      Comments: NO GROSS DEFORMITIES, BRUISING, SWELLING, ERYTHEMA, WARMTH NOTED TO LEFT KNEE.  CAPSULAR SHIFT CONFERENCE APPEAR SYMMETRICAL.  NEGATIVE HOMANS SIGN.  DISTAL PULSES AND SENSATION INTACT   Neurological: She is alert and oriented to person, place, and time. GCS score is 15. GCS eye subscore is 4. GCS verbal subscore is 5. GCS motor subscore is 6.   Skin: Skin is warm and dry.   Psychiatric: She has a normal mood and affect.         ED Course   Procedures  Labs Reviewed - No data to display       Imaging Results          US Lower Extremity Veins Left (Final result)  Result time 06/29/20 13:22:04   Procedure changed from US Lower Extremity Veins Bilateral     Final  result by Wilian Ferguson MD (06/29/20 13:22:04)                 Impression:      1. No sonographic evidence of DVT in the LLE.      Electronically signed by: Wilian Ferguson  Date:    06/29/2020  Time:    13:22             Narrative:    EXAMINATION:  US LOWER EXTREMITY VEINS LEFT    CLINICAL HISTORY:  left knee pain; Pain in left knee    TECHNIQUE:  Duplex and color flow Doppler evaluation and graded compression of the left lower extremity    COMPARISON:  None    FINDINGS:  Left thigh veins: Common femoral, superficial femoral, popliteal, upper greater saphenous and deep femoral veins demonstrate normal compression, phasic flow and augmentation without evidence for filling defects.    Left calf veins: The visualized calf veins are patent.    Contralateral CFV: The contralateral (right) common femoral vein is patent and free of thrombus.    Miscellaneous: No appreciable abnormalities in the region of interest along the anterolateral aspect of the left knee.                               X-Ray Knee 3 View Left (Final result)  Result time 06/29/20 12:55:17    Final result by Noé Romero MD (06/29/20 12:55:17)                 Impression:      No acute radiographic findings of the left knee.      Electronically signed by: Noé Romero MD  Date:    06/29/2020  Time:    12:55             Narrative:    EXAMINATION:  XR KNEE 3 VIEW LEFT    CLINICAL HISTORY:  Pain in left knee    TECHNIQUE:  AP, lateral, and Merchant views of the left knee were performed.    COMPARISON:  Radiograph 12/18/2013.    FINDINGS:  No acute displaced fractures.  No suspicious lytic or blastic lesions.  Cartilage spaces are preserved.  No subluxation or dislocation.  No joint effusion.  Visualized soft tissues are within normal limits.                                 Medical Decision Making:   History:   Old Medical Records: I decided to obtain old medical records.  Independently Interpreted Test(s):   I have ordered and independently  interpreted X-rays - see prior notes.  Clinical Tests:   Radiological Study: Ordered and Reviewed  ED Management:  HEMODYNAMICALLY STABLE.  NONTOXIC AND IN NO ACUTE DISTRESS.  PATIENT IS OVERALL WELL-APPEARING, PLEASANT, CONVERSATIONAL.  ULTRASOUND READ WITHOUT EVIDENCE OF DVT.  X-RAY READ REPORTS NO ACUTE FINDINGS.  WILL DISCHARGE HOME WITH ACE WRAP, RICE INSTRUCTIONS AND PCP ORTHOPEDICS FOLLOW UP.  PATIENT VERBALIZES UNDERSTANDING AND IS AGREEABLE WITH PLAN.  STRICT ED RETURN PRECAUTIONS GIVEN FOR ANY WORSENING OR ADDITIONAL CONCERNING SYMPTOMS.            Scribe Attestation:   Scribe #1: I performed the above scribed service and the documentation accurately describes the services I performed. I attest to the accuracy of the note.    Smooth Patel PA-C                      Clinical Impression:     1. Knee pain, left            Disposition:   Disposition: Discharged  Condition: Stable     ED Disposition Condition    Discharge Stable        ED Prescriptions     Medication Sig Dispense Start Date End Date Auth. Provider    naproxen (NAPROSYN) 500 MG tablet Take 1 tablet (500 mg total) by mouth 2 (two) times daily with meals. for 7 days 14 tablet 6/29/2020 7/6/2020 Smooth Patel PA-C    methocarbamoL (ROBAXIN) 500 MG Tab Take 2 tablets (1,000 mg total) by mouth 3 (three) times daily. for 5 days 30 tablet 6/29/2020 7/4/2020 Smooth Patel PA-C    lidocaine (LIDODERM) 5 % Place 1 patch onto the skin once daily. Remove & Discard patch within 12 hours or as directed by MD 15 patch 6/29/2020  Smooth Patel PA-C        Follow-up Information     Follow up With Specialties Details Why Contact Info     [facility] [place]  Schedule an appointment as soon as possible for a visit   [address] [place] [address]  [phone]      [facility] Surgical Oncology, Orthopedic Surgery, Genetics, Physical Medicine and Rehabilitation, Occupational Therapy, Radiology Schedule an  appointment as soon as possible for a visit  Orthopedics [address] [place] [id number]  [phone]                                       Smooth Patel PA-C  06/29/20 2450

## 2020-10-20 ENCOUNTER — HOSPITAL ENCOUNTER (EMERGENCY)
Facility: HOSPITAL | Age: 50
Discharge: HOME | End: 2020-10-20
Attending: EMERGENCY MEDICINE
Payer: MEDICAID

## 2020-10-20 VITALS
WEIGHT: 170 LBS | BODY MASS INDEX: 33.38 KG/M2 | HEIGHT: 60 IN | HEART RATE: 81 BPM | RESPIRATION RATE: 16 BRPM | SYSTOLIC BLOOD PRESSURE: 112 MMHG | DIASTOLIC BLOOD PRESSURE: 64 MMHG | OXYGEN SATURATION: 99 % | TEMPERATURE: 98 F

## 2020-10-20 DIAGNOSIS — R07.89 RIGHT-SIDED CHEST WALL PAIN: ICD-10-CM

## 2020-10-20 DIAGNOSIS — R51.9 HEADACHE IN FRONT OF HEAD: Primary | ICD-10-CM

## 2020-10-20 LAB
ALBUMIN SERPL BCP-MCNC: 3.8 G/DL (ref 3.5–5.2)
ALP SERPL-CCNC: 59 U/L (ref 55–135)
ALT SERPL W/O P-5'-P-CCNC: 39 U/L (ref 10–44)
ANION GAP SERPL CALC-SCNC: 13 MMOL/L (ref 8–16)
AST SERPL-CCNC: 34 U/L (ref 10–40)
BASOPHILS # BLD AUTO: 0.06 K/UL (ref 0–0.2)
BASOPHILS NFR BLD: 0.6 % (ref 0–1.9)
BILIRUB SERPL-MCNC: 0.9 MG/DL (ref 0.1–1)
BILIRUB UR QL STRIP: NEGATIVE
BUN SERPL-MCNC: 13 MG/DL (ref 6–20)
CALCIUM SERPL-MCNC: 9.1 MG/DL (ref 8.7–10.5)
CHLORIDE SERPL-SCNC: 96 MMOL/L (ref 95–110)
CLARITY UR: CLEAR
CO2 SERPL-SCNC: 30 MMOL/L (ref 23–29)
COLOR UR: NORMAL
CREAT SERPL-MCNC: 0.8 MG/DL (ref 0.5–1.4)
CTP QC/QA: YES
DIFFERENTIAL METHOD: ABNORMAL
EOSINOPHIL # BLD AUTO: 0.2 K/UL (ref 0–0.5)
EOSINOPHIL NFR BLD: 2.1 % (ref 0–8)
ERYTHROCYTE [DISTWIDTH] IN BLOOD BY AUTOMATED COUNT: 13.1 % (ref 11.5–14.5)
EST. GFR  (AFRICAN AMERICAN): >60 ML/MIN/1.73 M^2
EST. GFR  (NON AFRICAN AMERICAN): >60 ML/MIN/1.73 M^2
GLUCOSE SERPL-MCNC: 100 MG/DL (ref 70–110)
GLUCOSE UR QL STRIP: NEGATIVE
HCT VFR BLD AUTO: 42.2 % (ref 37–48.5)
HGB BLD-MCNC: 14.7 G/DL (ref 12–16)
HGB UR QL STRIP: NEGATIVE
IMM GRANULOCYTES # BLD AUTO: 0.05 K/UL (ref 0–0.04)
IMM GRANULOCYTES NFR BLD AUTO: 0.5 % (ref 0–0.5)
KETONES UR QL STRIP: NEGATIVE
LEUKOCYTE ESTERASE UR QL STRIP: NEGATIVE
LYMPHOCYTES # BLD AUTO: 4 K/UL (ref 1–4.8)
LYMPHOCYTES NFR BLD: 37.2 % (ref 18–48)
MCH RBC QN AUTO: 28 PG (ref 27–31)
MCHC RBC AUTO-ENTMCNC: 34.8 G/DL (ref 32–36)
MCV RBC AUTO: 80 FL (ref 82–98)
MONOCYTES # BLD AUTO: 0.7 K/UL (ref 0.3–1)
MONOCYTES NFR BLD: 6.7 % (ref 4–15)
NEUTROPHILS # BLD AUTO: 5.7 K/UL (ref 1.8–7.7)
NEUTROPHILS NFR BLD: 52.9 % (ref 38–73)
NITRITE UR QL STRIP: NEGATIVE
NRBC BLD-RTO: 0 /100 WBC
PH UR STRIP: 8 [PH] (ref 5–8)
PLATELET # BLD AUTO: 273 K/UL (ref 150–350)
PMV BLD AUTO: 9.9 FL (ref 9.2–12.9)
POTASSIUM SERPL-SCNC: 3.2 MMOL/L (ref 3.5–5.1)
PROT SERPL-MCNC: 7.3 G/DL (ref 6–8.4)
PROT UR QL STRIP: NEGATIVE
RBC # BLD AUTO: 5.25 M/UL (ref 4–5.4)
SARS-COV-2 RDRP RESP QL NAA+PROBE: NEGATIVE
SODIUM SERPL-SCNC: 139 MMOL/L (ref 136–145)
SP GR UR STRIP: 1.01 (ref 1–1.03)
URN SPEC COLLECT METH UR: NORMAL
UROBILINOGEN UR STRIP-ACNC: NEGATIVE EU/DL
WBC # BLD AUTO: 10.85 K/UL (ref 3.9–12.7)

## 2020-10-20 PROCEDURE — 93010 EKG 12-LEAD: ICD-10-PCS | Mod: ,,, | Performed by: INTERNAL MEDICINE

## 2020-10-20 PROCEDURE — 96374 THER/PROPH/DIAG INJ IV PUSH: CPT

## 2020-10-20 PROCEDURE — 85025 COMPLETE CBC W/AUTO DIFF WBC: CPT

## 2020-10-20 PROCEDURE — 93010 ELECTROCARDIOGRAM REPORT: CPT | Mod: ,,, | Performed by: INTERNAL MEDICINE

## 2020-10-20 PROCEDURE — 93005 ELECTROCARDIOGRAM TRACING: CPT

## 2020-10-20 PROCEDURE — 99285 EMERGENCY DEPT VISIT HI MDM: CPT | Mod: 25

## 2020-10-20 PROCEDURE — 81003 URINALYSIS AUTO W/O SCOPE: CPT

## 2020-10-20 PROCEDURE — 96375 TX/PRO/DX INJ NEW DRUG ADDON: CPT

## 2020-10-20 PROCEDURE — 63600175 PHARM REV CODE 636 W HCPCS: Performed by: EMERGENCY MEDICINE

## 2020-10-20 PROCEDURE — 80053 COMPREHEN METABOLIC PANEL: CPT

## 2020-10-20 PROCEDURE — U0002 COVID-19 LAB TEST NON-CDC: HCPCS | Performed by: EMERGENCY MEDICINE

## 2020-10-20 RX ORDER — PROCHLORPERAZINE MALEATE 10 MG
10 TABLET ORAL EVERY 6 HOURS PRN
Qty: 15 TABLET | Refills: 0 | Status: SHIPPED | OUTPATIENT
Start: 2020-10-20

## 2020-10-20 RX ORDER — AMOXICILLIN AND CLAVULANATE POTASSIUM 875; 125 MG/1; MG/1
1 TABLET, FILM COATED ORAL
COMMUNITY

## 2020-10-20 RX ORDER — PROCHLORPERAZINE EDISYLATE 5 MG/ML
10 INJECTION INTRAMUSCULAR; INTRAVENOUS ONCE
Status: COMPLETED | OUTPATIENT
Start: 2020-10-20 | End: 2020-10-20

## 2020-10-20 RX ORDER — DIPHENHYDRAMINE HCL 50 MG
50 CAPSULE ORAL EVERY 6 HOURS PRN
Qty: 20 CAPSULE | Refills: 0 | Status: SHIPPED | OUTPATIENT
Start: 2020-10-20

## 2020-10-20 RX ORDER — DIPHENHYDRAMINE HYDROCHLORIDE 50 MG/ML
50 INJECTION INTRAMUSCULAR; INTRAVENOUS
Status: COMPLETED | OUTPATIENT
Start: 2020-10-20 | End: 2020-10-20

## 2020-10-20 RX ORDER — KETOROLAC TROMETHAMINE 30 MG/ML
30 INJECTION, SOLUTION INTRAMUSCULAR; INTRAVENOUS
Status: COMPLETED | OUTPATIENT
Start: 2020-10-20 | End: 2020-10-20

## 2020-10-20 RX ADMIN — KETOROLAC TROMETHAMINE 30 MG: 30 INJECTION, SOLUTION INTRAMUSCULAR; INTRAVENOUS at 12:10

## 2020-10-20 RX ADMIN — PROCHLORPERAZINE EDISYLATE 10 MG: 5 INJECTION INTRAMUSCULAR; INTRAVENOUS at 12:10

## 2020-10-20 RX ADMIN — DIPHENHYDRAMINE HYDROCHLORIDE 50 MG: 50 INJECTION INTRAMUSCULAR; INTRAVENOUS at 12:10

## 2020-10-20 NOTE — FIRST PROVIDER EVALUATION
Emergency Department TeleTriage Encounter Note      CHIEF COMPLAINT    Chief Complaint   Patient presents with    Headache     x 2 days     Dizziness     x2 days    Flank Pain     right side pain x 1 day       VITAL SIGNS   Initial Vitals [10/20/20 1121]   BP Pulse Resp Temp SpO2   125/78 87 20 98.9 °F (37.2 °C) 99 %      MAP       --            ALLERGIES    Review of patient's allergies indicates:   Allergen Reactions    Sulfa (sulfonamide antibiotics) Hives and Swelling       PROVIDER TRIAGE NOTE   Patient presents to the ER with chief complaint of headache x 2 days, dizziness, and right sided flank pain since today.  She has nausea.  She is on Amoxil and prednisone for sinus infection. She denies vision changes.  She denies weakness or numbness of the extremities.  She denies CP or SOB. Will order blood work, UA pending ED provider evaluation.    VSS, patient in no acute distress.       ORDERS  Labs Reviewed - No data to display    ED Orders (720h ago, onward)    None            Virtual Visit Note: The provider triage portion of this emergency department evaluation and documentation was performed via [employer], a HIPAA-compliant telemedicine application, in concert with a tele-presenter in the room. A face to face patient evaluation with one of my colleagues will occur once the patient is placed in an emergency department room.      DISCLAIMER: This note was prepared with [employer] voice recognition transcription software. Garbled syntax, mangled pronouns, and other bizarre constructions may be attributed to that software system.

## 2020-10-20 NOTE — ED PROVIDER NOTES
Encounter Date: 10/20/2020    SCRIBE #1 NOTE: I, Ramsey Duke, sumit scribing for, and in the presence of, Fransisco Owen MD.       History     Chief Complaint   Patient presents with    Headache     x 2 days     Dizziness     x2 days    Flank Pain     right side pain x 1 day       Kylie Felix is a 49 year old female who present to the ED with a sudden onset of a frontal headache with a subsequent episode of dizziness occurring two days ago. Patient describes her headache as a constant, throbbing pressure which she currently rates at 10/10. The patient reports two weeks of congestion and left posterior shoulder pain. She states she experienced chills, subjective fever, and a sore throat yesterday which have since resolved. Patient awoke this morning with right lateral lower rib pain which she describes as constant. Denies any ear pain, blurry vision, eye pain, photophobia, visual disturbances, shortness of breath, chest pain, nausea, vomiting, diarrhea, dysuria, rashes, or syncope. Past medical history includes GERD and hypertension. Past surgical history of cholecystectomy. Denies recent social history of heavy lifting or repetitive motions.    The history is provided by the patient.     Review of patient's allergies indicates:   Allergen Reactions    Sulfa (sulfonamide antibiotics) Hives and Swelling     Past Medical History:   Diagnosis Date    Fatty liver     Gall stones 2015    GERD (gastroesophageal reflux disease)     Hypertension      Past Surgical History:   Procedure Laterality Date     SECTION, LOW TRANSVERSE      CYST REMOVAL      HYSTERECTOMY      LAPAROSCOPIC CHOLECYSTECTOMY N/A 2019    Procedure: CHOLECYSTECTOMY, LAPAROSCOPIC;  Surgeon: Raphael Ray MD;  Location: [facility] OR;  Service: General;  Laterality: N/A;    TUMOR REMOVAL       Family History   Problem Relation Age of Onset    Breast cancer Neg Hx     Colon cancer Neg Hx     Ovarian cancer Neg Hx      Social  History     Tobacco Use    Smoking status: Never Smoker    Smokeless tobacco: Never Used   Substance Use Topics    Alcohol use: No    Drug use: No     Review of Systems   Constitutional: Positive for chills and fever.   HENT: Positive for congestion and sore throat. Negative for ear pain.    Eyes: Negative for photophobia, pain and visual disturbance.   Respiratory: Negative for cough and shortness of breath.         Positive rib pain   Cardiovascular: Negative for chest pain.   Gastrointestinal: Negative for diarrhea, nausea and vomiting.   Genitourinary: Negative for dysuria.   Musculoskeletal: Positive for myalgias. Negative for arthralgias and joint swelling.   Skin: Negative for rash.   Neurological: Positive for dizziness and headaches. Negative for syncope.       Physical Exam     Initial Vitals [10/20/20 1121]   BP Pulse Resp Temp SpO2   125/78 87 20 98.9 °F (37.2 °C) 99 %      MAP       --         Physical Exam  The patient was examined specifically for the following:   General:No significant distress, Good color, Warm and dry. Head and neck:Scalp atraumatic, Neck supple. Neurological:Appropriate conversation, Gross motor deficits. Eyes:Conjugate gaze, Clear corneas. ENT: No epistaxis. Cardiac: Regular rate and rhythm, Grossly normal heart tones. Pulmonary: Wheezing, Rales. Gastrointestinal: Abdominal tenderness, Abdominal distention. Musculoskeletal: Extremity deformity, Apparent pain with range of motion of the joints. Skin: Rash.   The findings on examination were normal except for the following:  The patient has exquisite point tenderness at the costal margin and anterior axillary line, palpation there reproduces the pain of the chief complaint.  The lungs are clear.  The heart tones are normal.  The abdomen is soft.  The patient is crying.  There is no clinical evidence of respiratory distress.  The patient had no tachycardia.   ED Course   Procedures  Labs Reviewed   CBC W/ AUTO DIFFERENTIAL -  Abnormal; Notable for the following components:       Result Value    Mean Corpuscular Volume 80 (*)     Immature Grans (Abs) 0.05 (*)     All other components within normal limits   COMPREHENSIVE METABOLIC PANEL - Abnormal; Notable for the following components:    Potassium 3.2 (*)     CO2 30 (*)     All other components within normal limits   URINALYSIS, REFLEX TO URINE CULTURE    Narrative:     Specimen Source->Urine   SARS-COV-2 RDRP GENE     EKG Readings: (Independently Interpreted)   This patient is in a normal sinus rhythm heart rate of 73.  There are no significant ST segment or T-wave changes.  There is no definite evidence of acute myocardial infarction or malignant arrhythmia.     ECG Results          EKG 12-lead (In process)  Result time 10/20/20 13:14:09    In process by Interface, Lab In [facility] (10/20/20 13:14:09)                 Narrative:    Test Reason : R07.89,    Vent. Rate : 073 BPM     Atrial Rate : 073 BPM     P-R Int : 180 ms          QRS Dur : 076 ms      QT Int : 398 ms       P-R-T Axes : 045 010 024 degrees     QTc Int : 438 ms    Normal sinus rhythm  Septal infarct (cited on or before 31-MAR-2020)  Abnormal ECG  When compared with ECG of 22-JUN-2020 03:23,  Significant changes have occurred    Referred By: [email or web address]   SELF           Confirmed By:                   In process by Interface, Lab In [facility] (10/20/20 13:10:22)                 Narrative:    Test Reason : R07.89,    Vent. Rate : 073 BPM     Atrial Rate : 073 BPM     P-R Int : 180 ms          QRS Dur : 076 ms      QT Int : 398 ms       P-R-T Axes : 045 010 024 degrees     QTc Int : 438 ms    Normal sinus rhythm  Septal infarct (cited on or before 31-MAR-2020)  Abnormal ECG  When compared with ECG of 22-JUN-2020 03:23,  Significant changes have occurred    Referred By: [email or web address]   SELF           Confirmed By:                             Imaging Results          X-Ray Chest AP Portable (Final result)  Result time 10/20/20 13:16:26     Final result by Arcenio Craven MD (10/20/20 13:16:26)                 Impression:      No acute cardiopulmonary processes.      Electronically signed by: Arcenio Craven MD  Date:    10/20/2020  Time:    13:16             Narrative:    EXAMINATION:  XR CHEST AP PORTABLE    CLINICAL HISTORY:  chest pain;    TECHNIQUE:  Single frontal view of the chest was performed.    COMPARISON:  Chest 06/22/2020    FINDINGS:  Heart size is within normal limits.  Mediastinum is unremarkable.    There are no acute lobar consolidations or pneumothorax or pulmonary vascular congestion or pleural effusions.  There are cardiac monitoring leads over the chest.                              Medical decision making:  This patient presents to the emergency room with severe frontal head pain.  Her symptoms resolved in the emergency room.  The patient did not have uncontrolled hypertension.  There were no neurologic deficits the neck was supple.  Headache was mostly frontal.  I doubt subarachnoid hemorrhage or meningitis.  The patient was treated with Compazine Benadryl and Toradol in all of her symptoms resolved.  The patient does have some pain at the costal margin on the right side.  She is tender there.  I believe this is chest wall pain.  I will discharge her to outpatient evaluation and treatment.  I will have her follow up with primary care.                Scribe Attestation:   Scribe #1: I performed the above scribed service and the documentation accurately describes the services I performed. I attest to the accuracy of the note.                      Clinical Impression:     ICD-10-CM ICD-9-CM   1. Headache in front of head  R51.9 784.0   2. Right-sided chest wall pain  R07.89 786.52                          ED Disposition Condition    Discharge Stable        ED Prescriptions     Medication Sig Dispense Start Date End Date Auth. Provider    prochlorperazine (COMPAZINE) 10 MG tablet Take 1 tablet (10 mg total) by mouth every 6 (six)  hours as needed (Headache or nausea). 15 tablet 10/20/2020  Fransisco Owen MD    diphenhydrAMINE (BENADRYL) 50 MG capsule Take 1 capsule (50 mg total) by mouth every 6 (six) hours as needed (Every time you use Compazine). 20 capsule 10/20/2020  Fransisco Owen MD        Follow-up Information     Follow up With Specialties Details Why Contact Info    [facility]  In 2 days  [address] [place] [address]  [phone]                        I personally performed the services described in this documentation.  All medical record  entries made by the scribe are at my direction and in my presence.  Signed, Dr. Brayden Owen MD  10/20/20 4495

## 2020-10-20 NOTE — DISCHARGE INSTRUCTIONS
Compazine and BenadrylFor head pain.  Please use Tylenol, 1000 mg by mouth every 8 hr as needed for your right-sided chest pain.  Return immediately if you get worse or if new problems develop.  Please follow-up with your primary care doctor this week.  Rest.

## 2020-12-12 ENCOUNTER — HOSPITAL ENCOUNTER (EMERGENCY)
Facility: HOSPITAL | Age: 50
Discharge: HOME | End: 2020-12-12
Attending: EMERGENCY MEDICINE
Payer: MEDICAID

## 2020-12-12 VITALS
HEIGHT: 61 IN | SYSTOLIC BLOOD PRESSURE: 118 MMHG | BODY MASS INDEX: 32.1 KG/M2 | TEMPERATURE: 99 F | RESPIRATION RATE: 18 BRPM | DIASTOLIC BLOOD PRESSURE: 73 MMHG | WEIGHT: 170 LBS | OXYGEN SATURATION: 97 % | HEART RATE: 83 BPM

## 2020-12-12 DIAGNOSIS — S80.01XA CONTUSION OF RIGHT KNEE, INITIAL ENCOUNTER: Primary | ICD-10-CM

## 2020-12-12 DIAGNOSIS — M25.561 RIGHT KNEE PAIN: ICD-10-CM

## 2020-12-12 PROCEDURE — 99284 EMERGENCY DEPT VISIT MOD MDM: CPT | Mod: 25

## 2020-12-12 PROCEDURE — 25000003 PHARM REV CODE 250: Performed by: PHYSICIAN ASSISTANT

## 2020-12-12 RX ORDER — MELOXICAM 7.5 MG/1
7.5 TABLET ORAL DAILY
Qty: 12 TABLET | Refills: 0 | Status: SHIPPED | OUTPATIENT
Start: 2020-12-12

## 2020-12-12 RX ORDER — IBUPROFEN 600 MG/1
600 TABLET ORAL
Status: COMPLETED | OUTPATIENT
Start: 2020-12-12 | End: 2020-12-12

## 2020-12-12 RX ORDER — DICLOFENAC SODIUM 10 MG/G
2 GEL TOPICAL 3 TIMES DAILY
Qty: 100 G | Refills: 0 | Status: SHIPPED | OUTPATIENT
Start: 2020-12-12 | End: 2020-12-22

## 2020-12-12 RX ADMIN — IBUPROFEN 600 MG: 600 TABLET, FILM COATED ORAL at 04:12

## 2020-12-12 NOTE — ED TRIAGE NOTES
Pt presents to the ED via personal transportation reporting right knee pain after a slip and fall about an hour ago PTA. Pt denies any LOC or hitting her head. Also denies taking anything for pain. Pt in NAD.

## 2020-12-12 NOTE — ED PROVIDER NOTES
Encounter Date: 2020    SCRIBE #1 NOTE: I, Nick Johnson, am scribing for, and in the presence of,  Suman Gonzáles PA-C. I have scribed the following portions of the note - Other sections scribed: HPI, ROS.       History     Chief Complaint   Patient presents with    Fall     FELL ON STREET TODAY    Knee Pain     right knee pain after fall     This is a 50 y.o. female with a PMHx of HTN who presents to the ED c/o constant R knee pain secondary to a fall. Pt reports slipping in mud and hitting her knee on the ground but denies twisting of the joint. She was able to walk into the ED. Associated Sx include minor effusion and ecchymosis to the R knee. She denies taking any medications PTA. Allergies include Sulfa.     The history is provided by the patient. No  was used.     Review of patient's allergies indicates:   Allergen Reactions    Sulfa (sulfonamide antibiotics) Hives and Swelling     Past Medical History:   Diagnosis Date    Fatty liver     Gall stones 2015    GERD (gastroesophageal reflux disease)     Hypertension      Past Surgical History:   Procedure Laterality Date     SECTION, LOW TRANSVERSE      CYST REMOVAL      HYSTERECTOMY      LAPAROSCOPIC CHOLECYSTECTOMY N/A 2019    Procedure: CHOLECYSTECTOMY, LAPAROSCOPIC;  Surgeon: Raphael Ray MD;  Location: [facility] OR;  Service: General;  Laterality: N/A;    TUMOR REMOVAL       Family History   Problem Relation Age of Onset    Breast cancer Neg Hx     Colon cancer Neg Hx     Ovarian cancer Neg Hx      Social History     Tobacco Use    Smoking status: Never Smoker    Smokeless tobacco: Never Used   Substance Use Topics    Alcohol use: No    Drug use: No     Review of Systems   Constitutional: Negative for fever.   HENT: Negative for sore throat.    Respiratory: Negative for shortness of breath.    Cardiovascular: Negative for chest pain.   Gastrointestinal: Negative for nausea.   Genitourinary:  Negative for dysuria.   Musculoskeletal: Positive for joint swelling (+R knee) and myalgias (+R knee). Negative for back pain.   Skin: Negative for rash.        + R knee ecchymosis    Neurological: Negative for weakness.   Hematological: Does not bruise/bleed easily.       Physical Exam     Initial Vitals [12/12/20 1625]   BP Pulse Resp Temp SpO2   (!) 132/92 83 18 98.4 °F (36.9 °C) 99 %      MAP       --         Physical Exam    Nursing note and vitals reviewed.  Constitutional: She appears well-developed and well-nourished. She is not diaphoretic. No distress.   HENT:   Head: Normocephalic and atraumatic.   Nose: Nose normal.   Eyes: Conjunctivae and EOM are normal. Right eye exhibits no discharge. Left eye exhibits no discharge.   Neck: Normal range of motion. No tracheal deviation present. No JVD present.   Cardiovascular: Normal rate, regular rhythm and normal heart sounds. Exam reveals no friction rub.    No murmur heard.  Pulmonary/Chest: Breath sounds normal. No stridor. No respiratory distress. She has no wheezes. She has no rhonchi. She has no rales. She exhibits no tenderness.   Musculoskeletal: Normal range of motion.      Comments: Reproducible tenderness of the right anterior knee with associated swelling. No erythema or warmth.  Full ROM of right knee, but with reproduction of pain. No hip, popliteal space, calf, or ankle tenderness. No laxity of joint. Pedal pulses 2+ and equal.  Sensation intact and equal. Fully bears weight on right lower extremity, but with pain. Ambulating, but with limp to the right side.   Neurological: She is alert and oriented to person, place, and time.   Skin: Skin is warm and dry. No rash and no abscess noted. No erythema. No pallor.         ED Course   Procedures  Labs Reviewed - No data to display       Imaging Results          X-Ray Knee 3 View Right (Final result)  Result time 12/12/20 17:36:32    Final result by Shweta Clancy MD (12/12/20 17:36:32)                  Impression:      No acute bony abnormality detected.    .      Electronically signed by: Shweta Clancy  Date:    12/12/2020  Time:    17:36             Narrative:    EXAMINATION:  THREE VIEWS OF THE RIGHT KNEE    CLINICAL HISTORY:  Pain in right knee    TECHNIQUE:  AP, oblique, and lateral view of the right knee    COMPARISON:  None.    FINDINGS:  Three views of the right knee demonstrate no acute fracture or dislocation.                                 Medical Decision Making:   History:   Old Medical Records: I decided to obtain old medical records.  Independently Interpreted Test(s):   I have ordered and independently interpreted X-rays - see prior notes.  Clinical Tests:   Radiological Study: Ordered and Reviewed    This is an emergent evaluation of a 50 y.o. female presenting to the ED for knee pain. Denies other injury, hip pain, ankle pain, fever, inability to bear weight on knee, and numbness/tingling. Afebrile. Patient is non-toxic appearing and in no acute distress. Ambulatory in ED. Xray shows no acute fracture or dislocation.     Presentation suspicious for sprain/strain injury. No physical exam findings, Hx, or significant risk factors to suggest DVT. Given the above, I have also considered but doubt vascular injury, septic joint, abscess/cellulitis, ruptured baker's cyst, avascular necrosis, gout, ankle injury, hip injury, and lumbar back injury.    Pain controlled in ED. Discharged home with supportive care. I discussed RICE treatment with the patient and issued the patient an educational handout on knee injuries. Instructed to follow up with orthopedics for further evaluation and management of symptoms.     I discussed with the patient the diagnosis, treatment plan, indications for return to the emergency department, and for expected follow-up. The patient verbalized an understanding. The patient is asked if there are any questions or concerns. We discuss the case, until all issues are addressed  to the patients satisfaction. Patient understands and is agreeable to the plan.          Scribe Attestation:   Scribe #1: I performed the above scribed service and the documentation accurately describes the services I performed. I attest to the accuracy of the note.                      Clinical Impression:       ICD-10-CM ICD-9-CM   1. Contusion of right knee, initial encounter  S80.01XA 924.11   2. Right knee pain  M25.561 719.46                          ED Disposition Condition    Discharge Stable        ED Prescriptions     Medication Sig Dispense Start Date End Date Auth. Provider    meloxicam (MOBIC) 7.5 MG tablet Take 1 tablet (7.5 mg total) by mouth once daily. 12 tablet 12/12/2020  Suman Gonzáles PA-C    diclofenac sodium (VOLTAREN) 1 % Gel Apply 2 g topically 3 (three) times daily. for 10 days 100 g 12/12/2020 12/22/2020 Suman Gonzáles PA-C        Follow-up Information     Follow up With Specialties Details Why Contact Info    [facility]  Schedule an appointment as soon as possible for a visit in 2 days For re-evaluation [address] [place] [address]  [phone]      [facility] Surgical Oncology, Orthopedic Surgery, Genetics, Physical Medicine and Rehabilitation, Occupational Therapy, Radiology Go in 1 day For further evaluation, For more definitive management of your symptoms [address] [facility] [address]  [phone]      [facility] Emergency Medicine Go to  If symptoms worsen [address]  [facility] [id number] [phone]                        Scribe Attestation:     I, Suman Gonzáles PA-C, personally performed the services described in this documentation. All medical record entries made by the scribe were at my direction and in my presence.  I have reviewed the chart and agree that the record reflects my personal performance and is accurate and complete.             Suman Gonzáles PA-C  12/12/20  1854

## 2020-12-12 NOTE — Clinical Note
"Kylie"Mara Felix was seen and treated in our emergency department on 12/12/2020.  She may return to work on 12/15/2020.       If you have any questions or concerns, please don't hesitate to call.      Suman Gonzáles PA-C"

## 2021-04-16 ENCOUNTER — PATIENT MESSAGE (OUTPATIENT)
Dept: RESEARCH | Facility: HOSPITAL | Age: 51
End: 2021-04-16

## 2021-05-09 ENCOUNTER — HOSPITAL ENCOUNTER (EMERGENCY)
Facility: HOSPITAL | Age: 51
Discharge: HOME | End: 2021-05-09
Attending: EMERGENCY MEDICINE
Payer: MEDICAID

## 2021-05-09 VITALS
BODY MASS INDEX: 35.87 KG/M2 | WEIGHT: 190 LBS | RESPIRATION RATE: 18 BRPM | HEART RATE: 100 BPM | SYSTOLIC BLOOD PRESSURE: 131 MMHG | DIASTOLIC BLOOD PRESSURE: 84 MMHG | HEIGHT: 61 IN | TEMPERATURE: 98 F | OXYGEN SATURATION: 97 %

## 2021-05-09 DIAGNOSIS — R05.9 COUGH: ICD-10-CM

## 2021-05-09 DIAGNOSIS — J06.9 VIRAL URI WITH COUGH: Primary | ICD-10-CM

## 2021-05-09 LAB
CTP QC/QA: YES
MOLECULAR STREP A: NEGATIVE
POC MOLECULAR INFLUENZA A AGN: NEGATIVE
POC MOLECULAR INFLUENZA B AGN: NEGATIVE
SARS-COV-2 RDRP RESP QL NAA+PROBE: NEGATIVE

## 2021-05-09 PROCEDURE — 99284 EMERGENCY DEPT VISIT MOD MDM: CPT | Mod: 25

## 2021-05-09 PROCEDURE — 96372 THER/PROPH/DIAG INJ SC/IM: CPT

## 2021-05-09 PROCEDURE — 63600175 PHARM REV CODE 636 W HCPCS: Performed by: EMERGENCY MEDICINE

## 2021-05-09 PROCEDURE — U0002 COVID-19 LAB TEST NON-CDC: HCPCS | Performed by: EMERGENCY MEDICINE

## 2021-05-09 RX ORDER — CETIRIZINE HYDROCHLORIDE 10 MG/1
10 TABLET ORAL DAILY PRN
Qty: 7 TABLET | Refills: 0 | Status: SHIPPED | OUTPATIENT
Start: 2021-05-09 | End: 2021-05-16

## 2021-05-09 RX ORDER — KETOROLAC TROMETHAMINE 30 MG/ML
30 INJECTION, SOLUTION INTRAMUSCULAR; INTRAVENOUS
Status: COMPLETED | OUTPATIENT
Start: 2021-05-09 | End: 2021-05-09

## 2021-05-09 RX ORDER — IBUPROFEN 600 MG/1
600 TABLET ORAL EVERY 6 HOURS PRN
Qty: 20 TABLET | Refills: 0 | Status: SHIPPED | OUTPATIENT
Start: 2021-05-09 | End: 2022-12-03 | Stop reason: SDUPTHER

## 2021-05-09 RX ADMIN — KETOROLAC TROMETHAMINE 30 MG: 30 INJECTION, SOLUTION INTRAMUSCULAR; INTRAVENOUS at 08:05

## 2021-06-04 ENCOUNTER — HOSPITAL ENCOUNTER (OUTPATIENT)
Dept: RADIOLOGY | Facility: HOSPITAL | Age: 51
Discharge: HOME | End: 2021-06-04
Attending: INTERNAL MEDICINE
Payer: MEDICAID

## 2021-06-04 DIAGNOSIS — Z12.39 ENCOUNTER FOR OTHER SCREENING FOR MALIGNANT NEOPLASM OF BREAST: ICD-10-CM

## 2021-06-04 PROCEDURE — 77067 SCR MAMMO BI INCL CAD: CPT | Mod: TC

## 2021-06-04 PROCEDURE — 77067 SCR MAMMO BI INCL CAD: CPT | Mod: 26,,, | Performed by: RADIOLOGY

## 2021-06-04 PROCEDURE — 77067 MAMMO DIGITAL SCREENING BILAT: ICD-10-PCS | Mod: 26,,, | Performed by: RADIOLOGY

## 2021-06-30 ENCOUNTER — HOSPITAL ENCOUNTER (OUTPATIENT)
Dept: RADIOLOGY | Facility: HOSPITAL | Age: 51
Discharge: HOME | End: 2021-06-30
Attending: INTERNAL MEDICINE
Payer: MEDICAID

## 2021-06-30 DIAGNOSIS — R92.8 ABNORMAL MAMMOGRAM OF RIGHT BREAST: ICD-10-CM

## 2021-06-30 PROCEDURE — 77065 DX MAMMO INCL CAD UNI: CPT | Mod: 26,RT,, | Performed by: RADIOLOGY

## 2021-06-30 PROCEDURE — 77061 BREAST TOMOSYNTHESIS UNI: CPT | Mod: TC,RT

## 2021-06-30 PROCEDURE — 77061 MAMMO DIGITAL DIAGNOSTIC RIGHT WITH TOMO: ICD-10-PCS | Mod: 26,RT,, | Performed by: RADIOLOGY

## 2021-06-30 PROCEDURE — 77061 BREAST TOMOSYNTHESIS UNI: CPT | Mod: 26,RT,, | Performed by: RADIOLOGY

## 2021-06-30 PROCEDURE — 77065 MAMMO DIGITAL DIAGNOSTIC RIGHT WITH TOMO: ICD-10-PCS | Mod: 26,RT,, | Performed by: RADIOLOGY

## 2021-07-22 ENCOUNTER — TELEPHONE (OUTPATIENT)
Dept: RHEUMATOLOGY | Facility: CLINIC | Age: 51
End: 2021-07-22

## 2021-07-28 ENCOUNTER — HOSPITAL ENCOUNTER (OUTPATIENT)
Dept: RADIOLOGY | Facility: HOSPITAL | Age: 51
Discharge: HOME | End: 2021-07-28
Attending: INTERNAL MEDICINE
Payer: MEDICAID

## 2021-07-28 DIAGNOSIS — R92.8 ABNORMAL MAMMOGRAM OF RIGHT BREAST: ICD-10-CM

## 2021-07-28 PROCEDURE — 25000003 PHARM REV CODE 250: Performed by: INTERNAL MEDICINE

## 2021-07-28 PROCEDURE — 19081 BX BREAST 1ST LESION STRTCTC: CPT | Mod: RT,,, | Performed by: RADIOLOGY

## 2021-07-28 PROCEDURE — 88305 TISSUE EXAM BY PATHOLOGIST: ICD-10-PCS | Mod: 26,,, | Performed by: PATHOLOGY

## 2021-07-28 PROCEDURE — 19081 BX BREAST 1ST LESION STRTCTC: CPT | Mod: RT

## 2021-07-28 PROCEDURE — 88305 TISSUE EXAM BY PATHOLOGIST: CPT | Performed by: PATHOLOGY

## 2021-07-28 PROCEDURE — 19081 MAMMO BREAST STEREOTACTIC BREAST BIOPSY RIGHT: ICD-10-PCS | Mod: RT,,, | Performed by: RADIOLOGY

## 2021-07-28 PROCEDURE — 88305 TISSUE EXAM BY PATHOLOGIST: CPT | Mod: 26,,, | Performed by: PATHOLOGY

## 2021-07-28 RX ORDER — LIDOCAINE HYDROCHLORIDE 20 MG/ML
50 INJECTION, SOLUTION INFILTRATION; PERINEURAL ONCE
Status: COMPLETED | OUTPATIENT
Start: 2021-07-28 | End: 2021-07-28

## 2021-07-28 RX ADMIN — LIDOCAINE HYDROCHLORIDE 30 ML: 20 INJECTION, SOLUTION INFILTRATION; PERINEURAL at 08:07

## 2021-08-02 LAB
COMMENT: NORMAL
FINAL PATHOLOGIC DIAGNOSIS: NORMAL
GROSS: NORMAL
Lab: NORMAL

## 2022-03-16 ENCOUNTER — HOSPITAL ENCOUNTER (OUTPATIENT)
Dept: RADIOLOGY | Facility: HOSPITAL | Age: 52
Discharge: HOME | End: 2022-03-16
Attending: INTERNAL MEDICINE
Payer: MEDICAID

## 2022-03-16 DIAGNOSIS — R92.8 ABNORMAL MAMMOGRAM: ICD-10-CM

## 2022-03-16 DIAGNOSIS — Z98.890 H/O BENIGN BREAST BIOPSY: ICD-10-CM

## 2022-03-16 PROCEDURE — 77061 BREAST TOMOSYNTHESIS UNI: CPT | Mod: 26,RT,, | Performed by: RADIOLOGY

## 2022-03-16 PROCEDURE — 77065 DX MAMMO INCL CAD UNI: CPT | Mod: TC,RT

## 2022-03-16 PROCEDURE — 77061 MAMMO DIGITAL DIAGNOSTIC RIGHT WITH TOMO: ICD-10-PCS | Mod: 26,RT,, | Performed by: RADIOLOGY

## 2022-03-16 PROCEDURE — 77065 DX MAMMO INCL CAD UNI: CPT | Mod: 26,RT,, | Performed by: RADIOLOGY

## 2022-03-16 PROCEDURE — 77065 MAMMO DIGITAL DIAGNOSTIC RIGHT WITH TOMO: ICD-10-PCS | Mod: 26,RT,, | Performed by: RADIOLOGY

## 2022-09-09 ENCOUNTER — HOSPITAL ENCOUNTER (OUTPATIENT)
Dept: RADIOLOGY | Facility: HOSPITAL | Age: 52
Discharge: HOME | End: 2022-09-09
Attending: INTERNAL MEDICINE
Payer: MEDICAID

## 2022-09-09 VITALS — WEIGHT: 190.06 LBS | HEIGHT: 61 IN | BODY MASS INDEX: 35.88 KG/M2

## 2022-09-09 DIAGNOSIS — Z12.39 ENCOUNTER FOR SCREENING FOR MALIGNANT NEOPLASM OF BREAST, UNSPECIFIED SCREENING MODALITY: ICD-10-CM

## 2022-09-09 PROCEDURE — 77063 MAMMO DIGITAL SCREENING BILAT WITH TOMO: ICD-10-PCS | Mod: 26,,, | Performed by: RADIOLOGY

## 2022-09-09 PROCEDURE — 77063 BREAST TOMOSYNTHESIS BI: CPT | Mod: TC

## 2022-09-09 PROCEDURE — 77063 BREAST TOMOSYNTHESIS BI: CPT | Mod: 26,,, | Performed by: RADIOLOGY

## 2022-09-09 PROCEDURE — 77067 SCR MAMMO BI INCL CAD: CPT | Mod: 26,,, | Performed by: RADIOLOGY

## 2022-09-09 PROCEDURE — 77067 SCR MAMMO BI INCL CAD: CPT | Mod: TC

## 2022-09-09 PROCEDURE — 77067 MAMMO DIGITAL SCREENING BILAT WITH TOMO: ICD-10-PCS | Mod: 26,,, | Performed by: RADIOLOGY

## 2022-11-19 ENCOUNTER — HOSPITAL ENCOUNTER (EMERGENCY)
Facility: HOSPITAL | Age: 52
Discharge: HOME | End: 2022-11-19
Attending: EMERGENCY MEDICINE
Payer: MEDICAID

## 2022-11-19 VITALS
HEIGHT: 61 IN | WEIGHT: 180 LBS | HEART RATE: 86 BPM | SYSTOLIC BLOOD PRESSURE: 154 MMHG | BODY MASS INDEX: 33.99 KG/M2 | OXYGEN SATURATION: 96 % | DIASTOLIC BLOOD PRESSURE: 96 MMHG | RESPIRATION RATE: 20 BRPM | TEMPERATURE: 98 F

## 2022-11-19 DIAGNOSIS — L60.9 NAIL PROBLEM: ICD-10-CM

## 2022-11-19 PROCEDURE — 99283 EMERGENCY DEPT VISIT LOW MDM: CPT

## 2022-11-19 PROCEDURE — 25000003 PHARM REV CODE 250: Performed by: PHYSICIAN ASSISTANT

## 2022-11-19 RX ORDER — OXYCODONE AND ACETAMINOPHEN 5; 325 MG/1; MG/1
1 TABLET ORAL
Status: COMPLETED | OUTPATIENT
Start: 2022-11-19 | End: 2022-11-19

## 2022-11-19 RX ORDER — NAPROXEN 500 MG/1
500 TABLET ORAL 2 TIMES DAILY PRN
Qty: 30 TABLET | Refills: 0 | Status: SHIPPED | OUTPATIENT
Start: 2022-11-19

## 2022-11-19 RX ADMIN — OXYCODONE AND ACETAMINOPHEN 1 TABLET: 5; 325 TABLET ORAL at 08:11

## 2022-11-20 NOTE — ED PROVIDER NOTES
Encounter Date: 2022       History     Chief Complaint   Patient presents with    Nail Problem     Presents with complaint of bleeding left great toe at nail, states been treating for a fungus for 3 months     Chief Complaint: Nail problem  History of  Present Illness: History obtained from patient. This 52 y.o. female who has past medical history of hypertension presents to the ED complaining of pain and bleeding to the left great toenail.  Patient has been undergoing treatment with oral antifungals for the last 3 months.  Patient states she had a follow-up appointment with her primary care doctor a few weeks ago and was prescribed ciprofloxacin for nail infection.  Patient reports concern for bleeding from the nail today.  Denies fever, numbness, tingling, weakness.      Review of patient's allergies indicates:   Allergen Reactions    Sulfa (sulfonamide antibiotics) Hives and Swelling     Past Medical History:   Diagnosis Date    Fatty liver     Gall stones 2015    GERD (gastroesophageal reflux disease)     Hypertension      Past Surgical History:   Procedure Laterality Date    BREAST BIOPSY Right 2021    - Benign breast tissue with fibroadenomatoid nodules.     SECTION, LOW TRANSVERSE      CYST REMOVAL      HYSTERECTOMY      LAPAROSCOPIC CHOLECYSTECTOMY N/A 2019    Procedure: CHOLECYSTECTOMY, LAPAROSCOPIC;  Surgeon: Raphael Ray MD;  [facility];  Service: General;  Laterality: N/A;    TUMOR REMOVAL       Family History   Problem Relation Age of Onset    Breast cancer Neg Hx     Colon cancer Neg Hx     Ovarian cancer Neg Hx      Social History     Tobacco Use    Smoking status: Never    Smokeless tobacco: Never   Substance Use Topics    Alcohol use: No    Drug use: No     Review of Systems   Constitutional:  Negative for chills and fever.   HENT:  Negative for congestion, rhinorrhea and sore throat.    Eyes:  Negative for visual disturbance.   Respiratory:  Negative for  cough and shortness of breath.    Cardiovascular:  Negative for chest pain.   Gastrointestinal:  Negative for abdominal pain, diarrhea, nausea and vomiting.   Genitourinary:  Negative for dysuria, frequency and hematuria.   Musculoskeletal:  Negative for back pain.   Skin:  Negative for rash.   Neurological:  Negative for dizziness, weakness and headaches.     Physical Exam     Initial Vitals [11/19/22 2012]   BP Pulse Resp Temp SpO2   (!) 154/96 86 19 98.3 °F (36.8 °C) 96 %      MAP       --         Physical Exam    Nursing note and vitals reviewed.  Constitutional: She appears well-developed and well-nourished. She is active.  Non-toxic appearance. She does not have a sickly appearance. She does not appear ill.   HENT:   Head: Normocephalic and atraumatic.   Nose: Nose normal.   Mouth/Throat: Uvula is midline, oropharynx is clear and moist and mucous membranes are normal.   Eyes: Conjunctivae, EOM and lids are normal. Pupils are equal, round, and reactive to light.   Neck: Neck supple.   Normal range of motion.   Full passive range of motion without pain.     Cardiovascular:  Normal rate and regular rhythm.           Pulses:       Dorsalis pedis pulses are 2+ on the right side and 2+ on the left side.   Musculoskeletal:      Cervical back: Full passive range of motion without pain, normal range of motion and neck supple.      Comments: The left great toenail appears to be slightly lifted at the lateral nail fold.  There is scant dry blood.  No active bleeding.  No erythema, induration, edema or drainage.     Neurological: She is alert and oriented to person, place, and time.   Skin: Skin is warm. Capillary refill takes less than 2 seconds.       ED Course   Procedures  Labs Reviewed - No data to display       Imaging Results    None          Medications   oxyCODONE-acetaminophen 5-325 mg per tablet 1 tablet (1 tablet Oral Given 11/19/22 2030)     Medical Decision Making:   ED Management:  This is an evaluation of  a 52 y.o. female who presents to the ED for left great toe pain and bleeding.  Vital signs are stable.   Afebrile.  Patient is nontoxic appearing and in no acute distress.     HPI physical exam as above.  I doubt acute or infectious process at this time.  Will refer patient to podiatry for further treatment evaluation.    Patient given return precautions and instructed to return to the emergency department for any new or worsening symptoms. Patient verbalized understanding and agreed with plan. Encouraged follow-up with PCP.                          Clinical Impression:   Final diagnoses:  [L60.9] Nail problem        ED Disposition Condition    Discharge Stable          ED Prescriptions       Medication Sig Dispense Start Date End Date Auth. Provider    naproxen (NAPROSYN) 500 MG tablet Take 1 tablet (500 mg total) by mouth 2 (two) times daily as needed (Pain). Take With Meals 30 tablet 11/19/2022 -- Sivakumar Diego PA-C          Follow-up Information       Follow up With Specialties Details Why Contact Info    Shweta Recinos DPM Podiatry, Wound Care   [address] [facility]  [place] [address]  [phone]               Sivakumar Diego PA-C  11/19/22 2053       Sivakumar Diego PA-C  11/20/22 0011

## 2022-12-03 ENCOUNTER — HOSPITAL ENCOUNTER (EMERGENCY)
Facility: HOSPITAL | Age: 52
Discharge: HOME | End: 2022-12-03
Attending: EMERGENCY MEDICINE
Payer: MEDICAID

## 2022-12-03 VITALS
SYSTOLIC BLOOD PRESSURE: 143 MMHG | OXYGEN SATURATION: 100 % | WEIGHT: 180 LBS | DIASTOLIC BLOOD PRESSURE: 88 MMHG | RESPIRATION RATE: 20 BRPM | TEMPERATURE: 98 F | HEART RATE: 78 BPM | BODY MASS INDEX: 33.99 KG/M2 | HEIGHT: 61 IN

## 2022-12-03 DIAGNOSIS — R52 PAIN: ICD-10-CM

## 2022-12-03 DIAGNOSIS — S60.221A CONTUSION OF RIGHT HAND, INITIAL ENCOUNTER: Primary | ICD-10-CM

## 2022-12-03 PROCEDURE — 29125 APPL SHORT ARM SPLINT STATIC: CPT | Mod: RT

## 2022-12-03 PROCEDURE — 99284 EMERGENCY DEPT VISIT MOD MDM: CPT | Mod: 25

## 2022-12-03 PROCEDURE — 63600175 PHARM REV CODE 636 W HCPCS: Performed by: EMERGENCY MEDICINE

## 2022-12-03 PROCEDURE — 96372 THER/PROPH/DIAG INJ SC/IM: CPT | Performed by: EMERGENCY MEDICINE

## 2022-12-03 RX ORDER — KETOROLAC TROMETHAMINE 30 MG/ML
15 INJECTION, SOLUTION INTRAMUSCULAR; INTRAVENOUS
Status: COMPLETED | OUTPATIENT
Start: 2022-12-03 | End: 2022-12-03

## 2022-12-03 RX ORDER — IBUPROFEN 600 MG/1
600 TABLET ORAL EVERY 6 HOURS PRN
Qty: 20 TABLET | Refills: 0 | Status: SHIPPED | OUTPATIENT
Start: 2022-12-03

## 2022-12-03 RX ADMIN — KETOROLAC TROMETHAMINE 15 MG: 30 INJECTION, SOLUTION INTRAMUSCULAR; INTRAVENOUS at 06:12

## 2022-12-03 NOTE — ED PROVIDER NOTES
"Encounter Date: 12/3/2022    SCRIBE #1 NOTE: I, Norma Guy, am scribing for, and in the presence of,  Gladys Kumar MD. I have scribed the following portions of the note - Other sections scribed: HPI, ROS, PE.     History     Chief Complaint   Patient presents with    Hand Injury     Patient arrives with complaint of pain to her right wrist and forearm. She reports hitting her right hand on an exercise machine a few hours ago and the pain has been getting worse. Has not taken home meds     Kylie Felix is a 52 y.o. female, with a past medical history of HTN, who presents to the ED with a right wrist pain onset 3 hours ago. She describes this pain as "tight" in quality, radiating up her right forearm, and worsening since onset. Patient reports that this pain began after she hit her right hand on an exercise machine. Patient has not attempted treatment with any medication prior to arrival. No exacerbating or alleviating factors. She denies frequently performing repetitive movements with her right hand. She denies a past medical history of carpal tunnel. Patient endorses a family history of DVT and PE. Patient endorses allergies to Sulfa. Patient denies any other associated symptoms.    The history is provided by the patient. No  was used.   Review of patient's allergies indicates:   Allergen Reactions    Sulfa (sulfonamide antibiotics) Hives and Swelling     Past Medical History:   Diagnosis Date    Fatty liver     Gall stones 2015    GERD (gastroesophageal reflux disease)     Hypertension      Past Surgical History:   Procedure Laterality Date    BREAST BIOPSY Right 2021    - Benign breast tissue with fibroadenomatoid nodules.     SECTION, LOW TRANSVERSE      CYST REMOVAL      HYSTERECTOMY      LAPAROSCOPIC CHOLECYSTECTOMY N/A 2019    Procedure: CHOLECYSTECTOMY, LAPAROSCOPIC;  Surgeon: Raphael Ray MD;  Location: [facility] OR;  Service: General;  Laterality: N/A; "    TUMOR REMOVAL       Family History   Problem Relation Age of Onset    Breast cancer Neg Hx     Colon cancer Neg Hx     Ovarian cancer Neg Hx      Social History     Tobacco Use    Smoking status: Never    Smokeless tobacco: Never   Substance Use Topics    Alcohol use: No    Drug use: No     Review of Systems   Constitutional:  Negative for chills and fever.   HENT:  Negative for congestion.    Respiratory:  Negative for cough.    Cardiovascular:  Negative for chest pain.   Gastrointestinal:  Negative for abdominal pain and vomiting.   Genitourinary:  Negative for difficulty urinating.   Musculoskeletal:  Positive for arthralgias.        (+) right hand pain   Skin:  Negative for color change and wound.   Neurological:  Negative for weakness and numbness.     Physical Exam     Initial Vitals [12/03/22 0531]   BP Pulse Resp Temp SpO2   (!) 147/96 91 18 97.9 °F (36.6 °C) 100 %      MAP       --         Physical Exam    Nursing note and vitals reviewed.  Constitutional: She appears well-developed and well-nourished. She is not diaphoretic. No distress.   HENT:   Head: Normocephalic and atraumatic.   Eyes: Conjunctivae are normal.   Neck: Neck supple.   Cardiovascular:  Normal rate and regular rhythm.           Pulses:       Radial pulses are 2+ on the right side.   Pulmonary/Chest: Breath sounds normal.   Abdominal: Abdomen is soft.   Musculoskeletal:         General: Normal range of motion.      Right wrist: No swelling. Normal range of motion.      Cervical back: Neck supple.      Comments: Normal range of motion of the right wrist, no overlying skin changes, no swelling or deformity, no erythema.  No swelling of the right upper extremity.     Neurological: She is alert.   Sensation to light touch intact in radial, ulnar, median nerve distribution.   Skin: Skin is warm and dry. No erythema.   No contusion to right wrist.   Psychiatric: She has a normal mood and affect.       ED Course   Splint  Application    Date/Time: 12/3/2022 1:52 PM  Performed by: Gladys Kumar MD  Authorized by: Gladys Kumar MD   Location details: right wrist  Splint type: Velcro splint.  Post-procedure: The splinted body part was neurovascularly unchanged following the procedure.  Patient tolerance: Patient tolerated the procedure well with no immediate complications      Labs Reviewed - No data to display       Imaging Results              X-Ray Hand 3 view Right (Final result)  Result time 12/03/22 06:47:33      Final result by Ramin Turner MD (12/03/22 06:47:33)                   Impression:      No convincing evidence of acute fracture or dislocation.      Electronically signed by: Ramin Turner  Date:    12/03/2022  Time:    06:47               Narrative:    EXAMINATION:  XR HAND COMPLETE 3 VIEW RIGHT    CLINICAL HISTORY:  trauma;    TECHNIQUE:  PA, lateral, and oblique views of the right hand were performed.    COMPARISON:  None    FINDINGS:  No evidence of acute fracture or dislocation.  Joint spaces appear maintained.  No definite evidence of radiopaque foreign body.                                       X-Ray Wrist Complete Right (Final result)  Result time 12/03/22 06:46:41      Final result by Ramin Turner MD (12/03/22 06:46:41)                   Impression:      No convincing evidence of acute fracture or dislocation.      Electronically signed by: Ramin Turner  Date:    12/03/2022  Time:    06:46               Narrative:    EXAMINATION:  XR WRIST COMPLETE 3 VIEWS RIGHT    CLINICAL HISTORY:  Pain, unspecified    TECHNIQUE:  PA, lateral, and oblique views of the right wrist were performed.    COMPARISON:  None    FINDINGS:  No evidence of acute fracture or dislocation.  Joint spaces appear maintained.  No definite evidence of radiopaque foreign body.                                       Medications   ketorolac injection 15 mg (15 mg Intramuscular Given 12/3/22 0647)     Medical Decision Making:    History:   Old Medical Records: I decided to obtain old medical records.  Initial Assessment:   52-year-old female with history of high blood pressure presents with right wrist and forearm pain.  The patient struck her right hand on an exercise machine last night.  She woke up with pain in her right wrist shooting into her right forearm.  On exam, the patient is well-appearing, she is 2+ radial pulse on the right hand, sensation to light touch is intact.  There is no obvious deformity or decreased range of motion.  There is no erythema or swelling noted.  I suspect contusion versus sprain versus carpal tunnel syndrome.  Workup initiated with x-rays which are negative for acute fracture.  Will treat with wrist splint and NSAIDs.  Advised follow-up with primary care physician.  Clinical Tests:   Radiological Study: Ordered and Reviewed        Scribe Attestation:   Scribe #1: I performed the above scribed service and the documentation accurately describes the services I performed. I attest to the accuracy of the note.  Scribe #2: I performed the above scribed service and the documentation accurately describes the services I performed. I attest to the accuracy of the note.                   Clinical Impression:   Final diagnoses:  [R52] Pain  [S60.221A] Contusion of right hand, initial encounter (Primary)     I, Gladys Kumar MD, personally performed the services described in this documentation. All medical record entries made by the scribe were at my direction and in my presence. I have reviewed the chart and agree that the record reflects my personal performance and is accurate and complete.       This dictation has been generated using M-Modal Fluency Direct dictation; some phonetic errors may occur.      ED Disposition Condition    Discharge Stable          ED Prescriptions       Medication Sig Dispense Start Date End Date Auth. Provider    ibuprofen (ADVIL,MOTRIN) 600 MG tablet Take 1 tablet (600 mg total) by  mouth every 6 (six) hours as needed for Pain. 20 tablet 12/3/2022 -- Gladys Kumar MD          Follow-up Information       Follow up With Specialties Details Why Contact Info    Foreign Alexander MD Internal Medicine, Pediatrics Schedule an appointment as soon as possible for a visit on 12/9/2022 To recheck your symptoms [address]  [facility] [address]  [phone]      [facility] - Emergency Dept Emergency Medicine  As needed, If symptoms worsen [address] [place], [place] [address] [address] [phone]             Gladys Kumar MD  12/03/22 9895

## 2022-12-03 NOTE — DISCHARGE INSTRUCTIONS
Thank you for coming to our Emergency Department today. It is important to remember that some problems are difficult to diagnose and may not be found during your Emergency Department visit. Be sure to follow up with your primary care doctor and review all labs/imaging/tests that were performed during this visit with them. Some labs/tests may be outside of the normal range and require non-emergent follow-up and further investigation to help diagnose/exclude/prevent complications or other medical conditions.    If you do not have a primary care doctor, you may contact the one listed on your discharge paperwork or you may also call the [facility] Appointment Desk at [phone] to schedule an appointment and establish care with one. It is important to your health that you have a primary care doctor.    Medicaid Escalation Line:   [phone] - Please contact this number if you are having difficulty getting follow up with a Primary Care Provider or Speciality Provider.     Please take all medications as directed. All medications may potentially have side-effects and it is impossible to predict which medications may give you side-effects or what side-effects (if any) they will give you.. If you feel that you are having a negative effect or side-effect of any medication you should immediately stop taking them and seek medical attention. If you feel that you are having a life-threatening reaction call 251.    Return to the ER with any questions/concerns, new/concerning symptoms, worsening or failure to improve.     Do not drive, swim, climb to height, take a bath or make any important decisions for 24 hours if you have received any pain medications, sedatives or mood altering drugs during your ER visit.        BELOW THIS LINE ONLY APPLIES IF YOU HAVE A COVID TEST PENDING OR IF YOU HAVE BEEN DIAGNOSED WITH COVID:  Please access [facility] to review the results of your test. Until the results of your COVID test  return, you should isolate yourself so as not to potentially spread illness to others.   If your COVID test returns positive, you should isolate yourself so as not to spread illness to others. After five full days, if you are feeling better and you have not had fever for 24 hours, you can return to your typical daily activities, but you must wear a mask around others for an additional 5 days.   If your COVID test returns negative and you are either unvaccinated or more than six months out from your two-dose vaccine and are not yet boosted, you should still quarantine for 5 full days followed by strict mask use for an additional 5 full days.   If your COVID test returns negative and you have received your 2-dose initial vaccine as well as a booster, you should continue strict mask use for 10 full days after the exposure.  For all those exposed, best practice includes a test at day 5 after the exposure. This can be a home test or a test through one of the many testing centers throughout our community.   Masking is always advised to limit the spread of COVID. Cdc.gov is an excellent site to obtain the latest up to date recommendations regarding COVID and COVID testing.     CDC Testing and Quarantine Guidelines for patients with exposure to a known-positive COVID-19 person:  A close exposure is defined as anyone who has had an exposure (masked or unmasked) to a known COVID -19 positive person within 6 feet of someone for a cumulative total of 15 minutes or more over a 24-hour period.   Vaccinated and/or if you recently had a positive covid test within 90 days do NOT need to quarantine after contact with someone who had COVID-19 unless you develop symptoms.   Fully vaccinated people who have not had a positive test within 90 days, should get tested 3-5 days after their exposure, even if they don't have symptoms and wear a mask indoors in public for 14 days following exposure or until their test result is  negative.      Unvaccinated and/or NOT had a positive test within 90 days and meet close exposure  You are required by CDC guidelines to quarantine for at least 5 days from time of exposure followed by 5 days of strict masking. It is recommended, but not required to test after 5 days, unless you develop symptoms, in which case you should test at that time.  If you get tested after 5 days and your test is positive, your 5 day period of isolation starts the day of the positive test.    If your exposure does not meet the above definition, you can return to your normal daily activities to include social distancing, wearing a mask and frequent handwashing.      Here is a link to guidance from the CDC:  https://www.cdc.gov/media/releases/2021/s1227-isolation-quarantine-guidance.html      [facility]  Health Testing Sites:  https://ldh.[place].gov/page/3934      [facility] website with testing locations and guidance:  [email or web address]

## 2022-12-03 NOTE — ED NOTES
Pt to ER16, AAOx3.  C/O R hand pain, radiating up RUE.  Onset approx 2100 last night, after hitting hand on elliptical, since pain has worsened up arm.  No obvious deformity, swelling or injury noted to RUE.  2+ radial pulse palpated.  Cap refill < 3 secs.  Skin W/D, pink.  Denies numbness/tingling.  Full ROM present.  NAD noted.

## 2022-12-05 ENCOUNTER — LAB VISIT (OUTPATIENT)
Dept: LAB | Facility: HOSPITAL | Age: 52
End: 2022-12-05
Attending: PODIATRIST
Payer: MEDICAID

## 2022-12-05 DIAGNOSIS — M79.675 PAIN IN TOE OF LEFT FOOT: Primary | ICD-10-CM

## 2022-12-05 DIAGNOSIS — L08.9 INFECTION OF SKIN AND SUBCUTANEOUS TISSUE: ICD-10-CM

## 2022-12-05 LAB
ANION GAP SERPL CALC-SCNC: 8 MMOL/L (ref 8–16)
BASOPHILS # BLD AUTO: 0.02 K/UL (ref 0–0.2)
BASOPHILS NFR BLD: 0.3 % (ref 0–1.9)
BUN SERPL-MCNC: 10 MG/DL (ref 6–20)
CALCIUM SERPL-MCNC: 9.2 MG/DL (ref 8.7–10.5)
CHLORIDE SERPL-SCNC: 106 MMOL/L (ref 95–110)
CO2 SERPL-SCNC: 29 MMOL/L (ref 23–29)
CREAT SERPL-MCNC: 0.7 MG/DL (ref 0.5–1.4)
DIFFERENTIAL METHOD: NORMAL
EOSINOPHIL # BLD AUTO: 0.2 K/UL (ref 0–0.5)
EOSINOPHIL NFR BLD: 2.4 % (ref 0–8)
ERYTHROCYTE [DISTWIDTH] IN BLOOD BY AUTOMATED COUNT: 12.9 % (ref 11.5–14.5)
EST. GFR  (NO RACE VARIABLE): >60 ML/MIN/1.73 M^2
GLUCOSE SERPL-MCNC: 100 MG/DL (ref 70–110)
HCT VFR BLD AUTO: 39.6 % (ref 37–48.5)
HGB BLD-MCNC: 13.5 G/DL (ref 12–16)
IMM GRANULOCYTES # BLD AUTO: 0.01 K/UL (ref 0–0.04)
IMM GRANULOCYTES NFR BLD AUTO: 0.2 % (ref 0–0.5)
LYMPHOCYTES # BLD AUTO: 2.7 K/UL (ref 1–4.8)
LYMPHOCYTES NFR BLD: 42.4 % (ref 18–48)
MCH RBC QN AUTO: 28.2 PG (ref 27–31)
MCHC RBC AUTO-ENTMCNC: 34.1 G/DL (ref 32–36)
MCV RBC AUTO: 83 FL (ref 82–98)
MONOCYTES # BLD AUTO: 0.4 K/UL (ref 0.3–1)
MONOCYTES NFR BLD: 6.8 % (ref 4–15)
NEUTROPHILS # BLD AUTO: 3 K/UL (ref 1.8–7.7)
NEUTROPHILS NFR BLD: 47.9 % (ref 38–73)
NRBC BLD-RTO: 0 /100 WBC
PLATELET # BLD AUTO: 242 K/UL (ref 150–450)
PMV BLD AUTO: 9.6 FL (ref 9.2–12.9)
POTASSIUM SERPL-SCNC: 3.4 MMOL/L (ref 3.5–5.1)
RBC # BLD AUTO: 4.79 M/UL (ref 4–5.4)
SODIUM SERPL-SCNC: 143 MMOL/L (ref 136–145)
WBC # BLD AUTO: 6.3 K/UL (ref 3.9–12.7)

## 2022-12-05 PROCEDURE — 85025 COMPLETE CBC W/AUTO DIFF WBC: CPT | Performed by: PODIATRIST

## 2022-12-05 PROCEDURE — 36415 COLL VENOUS BLD VENIPUNCTURE: CPT | Performed by: PODIATRIST

## 2022-12-05 PROCEDURE — 80048 BASIC METABOLIC PNL TOTAL CA: CPT | Performed by: PODIATRIST

## 2022-12-19 DIAGNOSIS — M54.6 PAIN IN THORACIC SPINE: Primary | ICD-10-CM

## 2023-07-20 ENCOUNTER — HOSPITAL ENCOUNTER (EMERGENCY)
Facility: HOSPITAL | Age: 53
Discharge: HOME | End: 2023-07-20
Attending: EMERGENCY MEDICINE
Payer: MEDICAID

## 2023-07-20 VITALS
HEIGHT: 61 IN | WEIGHT: 180 LBS | BODY MASS INDEX: 33.99 KG/M2 | SYSTOLIC BLOOD PRESSURE: 121 MMHG | OXYGEN SATURATION: 99 % | TEMPERATURE: 98 F | RESPIRATION RATE: 18 BRPM | DIASTOLIC BLOOD PRESSURE: 66 MMHG | HEART RATE: 85 BPM

## 2023-07-20 DIAGNOSIS — M54.30 SCIATICA, UNSPECIFIED LATERALITY: ICD-10-CM

## 2023-07-20 DIAGNOSIS — K62.5 RECTAL BLEEDING: Primary | ICD-10-CM

## 2023-07-20 LAB
ABO + RH BLD: NORMAL
ALBUMIN SERPL BCP-MCNC: 3.9 G/DL (ref 3.5–5.2)
ALP SERPL-CCNC: 69 U/L (ref 55–135)
ALT SERPL W/O P-5'-P-CCNC: 29 U/L (ref 10–44)
ANION GAP SERPL CALC-SCNC: 12 MMOL/L (ref 8–16)
APTT PPP: 23.8 SEC (ref 21–32)
AST SERPL-CCNC: 27 U/L (ref 10–40)
BASOPHILS # BLD AUTO: 0.04 K/UL (ref 0–0.2)
BASOPHILS NFR BLD: 0.5 % (ref 0–1.9)
BILIRUB SERPL-MCNC: 0.6 MG/DL (ref 0.1–1)
BILIRUB UR QL STRIP: NEGATIVE
BLD GP AB SCN CELLS X3 SERPL QL: NORMAL
BUN SERPL-MCNC: 8 MG/DL (ref 6–20)
CALCIUM SERPL-MCNC: 9.6 MG/DL (ref 8.7–10.5)
CHLORIDE SERPL-SCNC: 106 MMOL/L (ref 95–110)
CLARITY UR: CLEAR
CO2 SERPL-SCNC: 25 MMOL/L (ref 23–29)
COLOR UR: YELLOW
CREAT SERPL-MCNC: 0.8 MG/DL (ref 0.5–1.4)
DIFFERENTIAL METHOD: ABNORMAL
EOSINOPHIL # BLD AUTO: 0.2 K/UL (ref 0–0.5)
EOSINOPHIL NFR BLD: 2.1 % (ref 0–8)
ERYTHROCYTE [DISTWIDTH] IN BLOOD BY AUTOMATED COUNT: 13 % (ref 11.5–14.5)
EST. GFR  (NO RACE VARIABLE): >60 ML/MIN/1.73 M^2
GLUCOSE SERPL-MCNC: 103 MG/DL (ref 70–110)
GLUCOSE UR QL STRIP: NEGATIVE
HCT VFR BLD AUTO: 42.9 % (ref 37–48.5)
HGB BLD-MCNC: 14.5 G/DL (ref 12–16)
HGB UR QL STRIP: NEGATIVE
IMM GRANULOCYTES # BLD AUTO: 0.02 K/UL (ref 0–0.04)
IMM GRANULOCYTES NFR BLD AUTO: 0.2 % (ref 0–0.5)
INR PPP: 1 (ref 0.8–1.2)
KETONES UR QL STRIP: NEGATIVE
LEUKOCYTE ESTERASE UR QL STRIP: NEGATIVE
LYMPHOCYTES # BLD AUTO: 3.2 K/UL (ref 1–4.8)
LYMPHOCYTES NFR BLD: 39.4 % (ref 18–48)
MCH RBC QN AUTO: 27.5 PG (ref 27–31)
MCHC RBC AUTO-ENTMCNC: 33.8 G/DL (ref 32–36)
MCV RBC AUTO: 81 FL (ref 82–98)
MONOCYTES # BLD AUTO: 0.5 K/UL (ref 0.3–1)
MONOCYTES NFR BLD: 5.9 % (ref 4–15)
NEUTROPHILS # BLD AUTO: 4.2 K/UL (ref 1.8–7.7)
NEUTROPHILS NFR BLD: 51.9 % (ref 38–73)
NITRITE UR QL STRIP: NEGATIVE
NRBC BLD-RTO: 0 /100 WBC
PH UR STRIP: 7 [PH] (ref 5–8)
PLATELET # BLD AUTO: 264 K/UL (ref 150–450)
PMV BLD AUTO: 9.9 FL (ref 9.2–12.9)
POTASSIUM SERPL-SCNC: 3.3 MMOL/L (ref 3.5–5.1)
PROT SERPL-MCNC: 7.6 G/DL (ref 6–8.4)
PROT UR QL STRIP: NEGATIVE
PROTHROMBIN TIME: 10.4 SEC (ref 9–12.5)
RBC # BLD AUTO: 5.27 M/UL (ref 4–5.4)
SODIUM SERPL-SCNC: 143 MMOL/L (ref 136–145)
SP GR UR STRIP: 1.01 (ref 1–1.03)
SPECIMEN OUTDATE: NORMAL
URN SPEC COLLECT METH UR: NORMAL
UROBILINOGEN UR STRIP-ACNC: NEGATIVE EU/DL
WBC # BLD AUTO: 8.12 K/UL (ref 3.9–12.7)

## 2023-07-20 PROCEDURE — 86900 BLOOD TYPING SEROLOGIC ABO: CPT | Performed by: EMERGENCY MEDICINE

## 2023-07-20 PROCEDURE — 25000003 PHARM REV CODE 250: Performed by: EMERGENCY MEDICINE

## 2023-07-20 PROCEDURE — 82272 OCCULT BLD FECES 1-3 TESTS: CPT

## 2023-07-20 PROCEDURE — 80053 COMPREHEN METABOLIC PANEL: CPT | Performed by: EMERGENCY MEDICINE

## 2023-07-20 PROCEDURE — 99284 EMERGENCY DEPT VISIT MOD MDM: CPT

## 2023-07-20 PROCEDURE — 85730 THROMBOPLASTIN TIME PARTIAL: CPT | Performed by: EMERGENCY MEDICINE

## 2023-07-20 PROCEDURE — 81003 URINALYSIS AUTO W/O SCOPE: CPT

## 2023-07-20 PROCEDURE — 85025 COMPLETE CBC W/AUTO DIFF WBC: CPT | Performed by: EMERGENCY MEDICINE

## 2023-07-20 PROCEDURE — 85610 PROTHROMBIN TIME: CPT | Performed by: EMERGENCY MEDICINE

## 2023-07-20 RX ORDER — CYCLOBENZAPRINE HCL 10 MG
10 TABLET ORAL
Status: COMPLETED | OUTPATIENT
Start: 2023-07-20 | End: 2023-07-20

## 2023-07-20 RX ORDER — DOCUSATE SODIUM 100 MG/1
100 CAPSULE, LIQUID FILLED ORAL 2 TIMES DAILY
Qty: 60 CAPSULE | Refills: 0 | Status: SHIPPED | OUTPATIENT
Start: 2023-07-20

## 2023-07-20 RX ORDER — CYCLOBENZAPRINE HCL 10 MG
10 TABLET ORAL 3 TIMES DAILY PRN
Qty: 15 TABLET | Refills: 0 | Status: SHIPPED | OUTPATIENT
Start: 2023-07-20 | End: 2023-07-25

## 2023-07-20 RX ORDER — ACETAMINOPHEN 500 MG
500 TABLET ORAL EVERY 6 HOURS PRN
Qty: 13 TABLET | Refills: 0 | Status: SHIPPED | OUTPATIENT
Start: 2023-07-20

## 2023-07-20 RX ORDER — ACETAMINOPHEN 500 MG
500 TABLET ORAL
Status: COMPLETED | OUTPATIENT
Start: 2023-07-20 | End: 2023-07-20

## 2023-07-20 RX ORDER — LIDOCAINE 50 MG/G
1 PATCH TOPICAL DAILY
Qty: 5 PATCH | Refills: 0 | Status: SHIPPED | OUTPATIENT
Start: 2023-07-20

## 2023-07-20 RX ORDER — PANTOPRAZOLE SODIUM 40 MG/1
40 TABLET, DELAYED RELEASE ORAL DAILY
Qty: 30 TABLET | Refills: 11 | Status: SHIPPED | OUTPATIENT
Start: 2023-07-20 | End: 2024-07-19

## 2023-07-20 RX ORDER — LIDOCAINE 50 MG/G
1 PATCH TOPICAL
Status: DISCONTINUED | OUTPATIENT
Start: 2023-07-20 | End: 2023-07-20 | Stop reason: HOSPADM

## 2023-07-20 RX ADMIN — CYCLOBENZAPRINE 10 MG: 10 TABLET, FILM COATED ORAL at 12:07

## 2023-07-20 RX ADMIN — ACETAMINOPHEN 500 MG: 500 TABLET ORAL at 12:07

## 2023-07-20 RX ADMIN — LIDOCAINE PATCH 5% 1 PATCH: 700 PATCH TOPICAL at 12:07

## 2023-07-20 NOTE — ED PROVIDER NOTES
Encounter Date: 2023    SCRIBE #1 NOTE: I, Jeffery Jin, am scribing for, and in the presence of,  Delon Jensen MD. Other sections scribed: HPI, ROS, PE.     History     Chief Complaint   Patient presents with    Rectal Bleeding     Patient is 51yo female that had some bright red blood in her stool yesterday and previously about 4 months ago. Tried to se GI dr but had issues with insurance. Also reports lower right back pain that started about a week ago and is causing difficulty walking. Denied any injury to back.      CC: Rectal bleeding    HPI: History is provided by independent historian. This is a 52 y.o. F who has a PMHx of Fatty liver, Gall stones, GERD, and HTN who presents to the ED for emergent evaluation of acute and intermittent rectal bleeding with associated intermittent abdominal pain that began a few months ago. She describes the abdominal pain a cramping sensation. She currently is not experiencing the abdominal pain. Pt states that the rectal bleeding only occurs when making a bowel movement. She last had an episode of rectal bleeding after making a bowel movement yesterday. She reports noticing blood in the stool and blood in the toilet at that time. She does not strain when making bowel movement. She denies that the stool is hard. She reports 1 episode of noticing blood on tissue paper after making a bowel movement a few months ago. Additionally, the complains of right lower back pain that radiates to bilateral hips and bilateral lower extremities x's 1 week. She describes the back pain as a pressure-like sensation. She reports difficulty walking due to the back pain. She is suppose to be taking Pantoprazole daily, but she has not been taking due to running out of the medication 3 months ago. She has not refilled the medication due to issue with insurance. Pt has not been taking fiber supplements. Pt has a surgical history of Breast biopsy, , Cyst removal, Hysterectomy,  Cholecystectomy, and Tumor removal. Pt's allergies includes Sulfa. Pt denies hematuria, recent falls, anticoagulant use, tobacco use, alcohol use, or recreational drug use.    The history is provided by the patient. No  was used.   Review of patient's allergies indicates:   Allergen Reactions    Sulfa (sulfonamide antibiotics) Hives and Swelling     Past Medical History:   Diagnosis Date    Fatty liver     Gall stones 2015    GERD (gastroesophageal reflux disease)     Hypertension      Past Surgical History:   Procedure Laterality Date    BREAST BIOPSY Right 2021    - Benign breast tissue with fibroadenomatoid nodules.     SECTION, LOW TRANSVERSE      CYST REMOVAL      HYSTERECTOMY      LAPAROSCOPIC CHOLECYSTECTOMY N/A 2019    Procedure: CHOLECYSTECTOMY, LAPAROSCOPIC;  Surgeon: Raphael Ray MD;  Location: [facility] OR;  Service: General;  Laterality: N/A;    TUMOR REMOVAL       Family History   Problem Relation Age of Onset    Breast cancer Neg Hx     Colon cancer Neg Hx     Ovarian cancer Neg Hx      Social History     Tobacco Use    Smoking status: Never    Smokeless tobacco: Never   Substance Use Topics    Alcohol use: No    Drug use: No     Review of Systems   Constitutional:  Negative for chills and fever.   HENT:  Negative for congestion, rhinorrhea and sore throat.    Eyes:  Negative for pain and visual disturbance.   Respiratory:  Negative for chest tightness and shortness of breath.    Cardiovascular:  Negative for chest pain.   Gastrointestinal:  Positive for anal bleeding and blood in stool. Negative for abdominal pain, diarrhea, nausea and vomiting.   Endocrine: Negative for polydipsia and polyuria.   Genitourinary:  Negative for dysuria and flank pain.   Musculoskeletal:  Positive for arthralgias (bilateral hips), back pain (right lower back) and myalgias (bilateral lower extremities). Negative for neck pain and neck stiffness.   Skin:  Negative for rash.    Allergic/Immunologic: Negative for immunocompromised state.   Neurological:  Negative for weakness and headaches.   Hematological:  Does not bruise/bleed easily.   Psychiatric/Behavioral:  Negative for agitation and behavioral problems.      Physical Exam     Initial Vitals [07/20/23 0944]   BP Pulse Resp Temp SpO2   (!) 180/93 97 16 99.1 °F (37.3 °C) 100 %      MAP       --         Physical Exam    Nursing note and vitals reviewed.  Constitutional: She appears well-developed and well-nourished.   HENT:   Head: Normocephalic and atraumatic.   Mouth/Throat: Oropharynx is clear and moist and mucous membranes are normal.   Eyes: Conjunctivae and EOM are normal. Pupils are equal, round, and reactive to light. Right conjunctiva is not injected. Left conjunctiva is not injected. No scleral icterus.   Neck: Neck supple.   Normal range of motion.   Full passive range of motion without pain.     Cardiovascular:  Normal rate, regular rhythm, S1 normal, S2 normal, normal heart sounds and normal pulses.     Exam reveals no gallop and no friction rub.       No murmur heard.  Pulses:       Radial pulses are 2+ on the right side and 2+ on the left side.   Pulmonary/Chest: Effort normal and breath sounds normal. No respiratory distress.   Abdominal: Abdomen is soft. She exhibits no distension. There is no abdominal tenderness.   Genitourinary:    Genitourinary Comments: Rectal exam performed with chaperone nurse. There are no hemorrhoids, no rectal tear, and no melena. Hemoccult is negative.     Musculoskeletal:         General: No edema. Normal range of motion.      Cervical back: Full passive range of motion without pain, normal range of motion and neck supple.      Comments: Right lumbar tenderness. Good active ROM of all extremities. No lower extremity edema or cyanosis.      Neurological: No cranial nerve deficit.   A&Ox4, normal speech.   Skin: Skin is warm. No ecchymosis and no rash noted.   Psychiatric: She has a normal  mood and affect. Thought content normal.       ED Course   Procedures  Labs Reviewed   COMPREHENSIVE METABOLIC PANEL - Abnormal; Notable for the following components:       Result Value    Potassium 3.3 (*)     All other components within normal limits   CBC W/ AUTO DIFFERENTIAL - Abnormal; Notable for the following components:    MCV 81 (*)     All other components within normal limits   URINALYSIS, REFLEX TO URINE CULTURE    Narrative:     Specimen Source->Urine   APTT   PROTIME-INR   TYPE & SCREEN          Imaging Results    None          Medications   acetaminophen tablet 500 mg (500 mg Oral Given 7/20/23 1211)   cyclobenzaprine tablet 10 mg (10 mg Oral Given 7/20/23 1211)     Medical Decision Making:   Initial Assessment:   Shared decision making with patient.     52-year-old female presenting secondary to rectal bleeding and also lumbar pain.  UA is negative for UTI.  Doubt pyelonephritis.  Think patient likely has a lumbar strain and and or sciatica.  No loss of bowel or bladder function.  No fevers.  No midline tenderness.  Rectal exam negative.  Escorted.  Hemoglobin at baseline.  Vitals reassuring.  Pain controlled.  Discharged with medications below.  No rashes or lesions think cellulitis.  Afebrile.  No major electrolyte abnormalities.  Discharged with outpatient follow-up. I discussed with the patient/family the diagnosis, treatment plan, indications for return to the emergency department, and for expected follow-up. The patient/family verbalized an understanding. The patient/family is asked if there are any questions or concerns. We discuss the case, until all issues are addressed to the patient/family's satisfaction. Patient/family understands and is agreeable to the plan.   Delon Jensen    DISCLAIMER: This note was prepared with [employer] voice recognition transcription software. Garbled syntax, mangled pronouns, and other bizarre constructions may be attributed to that software system.    Clinical  Tests:   Lab Tests: Ordered and Reviewed        Scribe Attestation:   Scribe #1: I performed the above scribed service and the documentation accurately describes the services I performed. I attest to the accuracy of the note.                 I, delon jensen, personally performed the services described in this documentation. All medical record entries made by the scribe were at my direction and in my presence. I have reviewed the chart and agree that the record reflects my personal performance and is accurate and complete.    Clinical Impression:   Final diagnoses:  [K62.5] Rectal bleeding (Primary)  [M54.30] Sciatica, unspecified laterality        ED Disposition Condition    Discharge Stable          ED Prescriptions       Medication Sig Dispense Start Date End Date Auth. Provider    acetaminophen (TYLENOL) 500 MG tablet Take 1 tablet (500 mg total) by mouth every 6 (six) hours as needed. 13 tablet 7/20/2023 -- Delon Jensen MD    cyclobenzaprine (FLEXERIL) 10 MG tablet (Expires today) Take 1 tablet (10 mg total) by mouth 3 (three) times daily as needed. 15 tablet 7/20/2023 7/25/2023 Delon Jensen MD    LIDOcaine (LIDODERM) 5 % Place 1 patch onto the skin once daily. Remove & Discard patch within 12 hours or as directed by MD 5 patch 7/20/2023 -- Delon Jensen MD    docusate sodium (COLACE) 100 MG capsule Take 1 capsule (100 mg total) by mouth 2 (two) times daily. 60 capsule 7/20/2023 -- Delon Jensen MD    pantoprazole (PROTONIX) 40 MG tablet Take 1 tablet (40 mg total) by mouth once daily. 30 tablet 7/20/2023 7/19/2024 Delon Jensen MD          Follow-up Information       Follow up With Specialties Details Why Contact Info    Foreign Alexander MD Internal Medicine, Pediatrics Schedule an appointment as soon as possible for a visit in 2 days  [address]  [facility] [address]  [phone]      Philomena Mercado PA-C Neurosurgery Schedule an appointment as soon as possible for a  visit in 2 days  [address] [facility] [address]  [address]  [place] [address]  [phone]      Espinoza Coughlin MD Gastroenterology Schedule an appointment as soon as possible for a visit in 2 days  [address] [facility]  [address]  [facility]  [place] [address]  [phone]               Delon Jensen MD  07/25/23 0622

## 2023-07-20 NOTE — DISCHARGE INSTRUCTIONS

## 2024-02-08 DIAGNOSIS — Z12.39 ENCOUNTER FOR SCREENING FOR MALIGNANT NEOPLASM OF BREAST: Primary | ICD-10-CM

## 2024-03-26 ENCOUNTER — HOSPITAL ENCOUNTER (OUTPATIENT)
Dept: RADIOLOGY | Facility: HOSPITAL | Age: 54
Discharge: HOME | End: 2024-03-26
Attending: INTERNAL MEDICINE
Payer: MEDICAID

## 2024-03-26 DIAGNOSIS — Z12.39 ENCOUNTER FOR SCREENING FOR MALIGNANT NEOPLASM OF BREAST: ICD-10-CM

## 2024-03-26 PROCEDURE — 77063 BREAST TOMOSYNTHESIS BI: CPT | Mod: 26,,, | Performed by: RADIOLOGY

## 2024-03-26 PROCEDURE — 77067 SCR MAMMO BI INCL CAD: CPT | Mod: TC

## 2024-03-26 PROCEDURE — 77067 SCR MAMMO BI INCL CAD: CPT | Mod: 26,,, | Performed by: RADIOLOGY

## 2024-06-17 ENCOUNTER — HOSPITAL ENCOUNTER (EMERGENCY)
Facility: HOSPITAL | Age: 54
Discharge: HOME | End: 2024-06-17
Attending: EMERGENCY MEDICINE
Payer: MEDICAID

## 2024-06-17 VITALS
SYSTOLIC BLOOD PRESSURE: 154 MMHG | TEMPERATURE: 98 F | DIASTOLIC BLOOD PRESSURE: 90 MMHG | RESPIRATION RATE: 20 BRPM | HEART RATE: 78 BPM | WEIGHT: 180 LBS | BODY MASS INDEX: 34.01 KG/M2 | OXYGEN SATURATION: 97 %

## 2024-06-17 DIAGNOSIS — M54.40 BACK PAIN OF LUMBAR REGION WITH SCIATICA: Primary | ICD-10-CM

## 2024-06-17 LAB
BILIRUBIN, POC UA: NEGATIVE
BLOOD, POC UA: NEGATIVE
CLARITY, POC UA: CLEAR
COLOR, POC UA: YELLOW
GLUCOSE, POC UA: NEGATIVE
KETONES, POC UA: NEGATIVE
LEUKOCYTE EST, POC UA: NEGATIVE
NITRITE, POC UA: NEGATIVE
PH UR STRIP: 5.5 [PH]
PROTEIN, POC UA: NEGATIVE
SPECIFIC GRAVITY, POC UA: 1.02
UROBILINOGEN, POC UA: 0.2 E.U./DL

## 2024-06-17 PROCEDURE — 99284 EMERGENCY DEPT VISIT MOD MDM: CPT | Mod: 25,ER

## 2024-06-17 PROCEDURE — 96372 THER/PROPH/DIAG INJ SC/IM: CPT | Performed by: EMERGENCY MEDICINE

## 2024-06-17 PROCEDURE — 63600175 PHARM REV CODE 636 W HCPCS: Mod: ER | Performed by: EMERGENCY MEDICINE

## 2024-06-17 RX ORDER — DEXAMETHASONE SODIUM PHOSPHATE 4 MG/ML
10 INJECTION, SOLUTION INTRA-ARTICULAR; INTRALESIONAL; INTRAMUSCULAR; INTRAVENOUS; SOFT TISSUE
Status: DISCONTINUED | OUTPATIENT
Start: 2024-06-17 | End: 2024-06-17

## 2024-06-17 RX ORDER — PREDNISONE 20 MG/1
60 TABLET ORAL DAILY
Qty: 15 TABLET | Refills: 0 | Status: SHIPPED | OUTPATIENT
Start: 2024-06-18 | End: 2024-06-23

## 2024-06-17 RX ORDER — DEXAMETHASONE SODIUM PHOSPHATE 4 MG/ML
10 INJECTION, SOLUTION INTRA-ARTICULAR; INTRALESIONAL; INTRAMUSCULAR; INTRAVENOUS; SOFT TISSUE
Status: COMPLETED | OUTPATIENT
Start: 2024-06-17 | End: 2024-06-17

## 2024-06-17 RX ADMIN — DEXAMETHASONE SODIUM PHOSPHATE 10 MG: 4 INJECTION INTRA-ARTICULAR; INTRALESIONAL; INTRAMUSCULAR; INTRAVENOUS; SOFT TISSUE at 02:06

## 2024-06-17 NOTE — DISCHARGE INSTRUCTIONS
Prednisone as directed.  Use a heating pad and rest.  Please follow-up with the pain management doctor above in the neurosurgeon above about your lumbar back pain.  Use a heating pad.  Rest.  Return immediately if you get worse or if new problems develop.

## 2024-06-17 NOTE — ED PROVIDER NOTES
Encounter Date: 2024    SCRIBE #1 NOTE: I, Amaliayoon Alvarez, am scribing for, and in the presence of,  Fransisco Owen MD.       History     Chief Complaint   Patient presents with    Leg Pain     Bilateral leg pain and back pain starting on Saturday        53 y.o. female w/ PMHx of chronic back pain, HTN, GERD, fatty liver, presents to the ED w/ acute on chronic bilateral lumbar back pain radiating to BLE x1 month. Reports pain is worse on the right than the left, radiating to lateral aspect of distal right thigh and lateral aspect of proximal left thigh. She reports associated intermittent frontal headache and nausea. She denies recent falls. injuries or trauma. She denies Hx of DM. She reports her back pain is managed by her PCP, Dr. Alexander. Patient denies cough, shortness of breath, chest pain, fever, chills, abdominal pain,  vomiting, diarrhea, dysuria, congestion, sore throat, arm or leg trouble, eye pain, ear pain, rash, bladder or bowel incontinence, or other associated symptoms.    The history is provided by the patient. No  was used.     Review of patient's allergies indicates:   Allergen Reactions    Sulfa (sulfonamide antibiotics) Hives and Swelling     Past Medical History:   Diagnosis Date    Fatty liver     Gall stones 2015    GERD (gastroesophageal reflux disease)     Hypertension      Past Surgical History:   Procedure Laterality Date    BREAST BIOPSY Right 2021    - Benign breast tissue with fibroadenomatoid nodules.     SECTION, LOW TRANSVERSE      CYST REMOVAL      HYSTERECTOMY      LAPAROSCOPIC CHOLECYSTECTOMY N/A 2019    Procedure: CHOLECYSTECTOMY, LAPAROSCOPIC;  Surgeon: Raphael Ray MD;  Location: [facility] OR;  Service: General;  Laterality: N/A;    TUMOR REMOVAL       Family History   Problem Relation Name Age of Onset    Breast cancer Neg Hx      Colon cancer Neg Hx      Ovarian cancer Neg Hx       Social History     Tobacco Use    Smoking  status: Never    Smokeless tobacco: Never   Substance Use Topics    Alcohol use: No    Drug use: No     Review of Systems   Constitutional:  Negative for chills, diaphoresis and fever.   HENT:  Negative for congestion, ear pain and sore throat.    Eyes:  Negative for pain.   Respiratory:  Negative for cough and shortness of breath.    Cardiovascular:  Negative for chest pain.   Gastrointestinal:  Negative for abdominal pain, diarrhea, nausea and vomiting.   Genitourinary:  Negative for dysuria.   Musculoskeletal:  Positive for back pain and myalgias. Negative for gait problem.        (-) Arm or leg trouble.    Skin:  Negative for rash.   Neurological:  Negative for weakness, numbness and headaches.        (-) bladder or bowel incontinence   Psychiatric/Behavioral:  Negative for confusion.        Physical Exam     Initial Vitals [06/17/24 1307]   BP Pulse Resp Temp SpO2   (!) 154/90 78 20 97.5 °F (36.4 °C) 97 %      MAP       --         Physical Exam  The patient was examined specifically for the following:   General:No significant distress, Good color, Warm and dry. Head and neck:Scalp atraumatic, Neck supple. Neurological:Appropriate conversation, Gross motor deficits. Eyes:Conjugate gaze, Clear corneas. ENT: No epistaxis. Cardiac: Regular rate and rhythm, Grossly normal heart tones. Pulmonary: Wheezing, Rales. Gastrointestinal: Abdominal tenderness, Abdominal distention. Musculoskeletal: Extremity deformity, Apparent pain with range of motion of the joints. Skin: Rash.   The findings on examination were normal except for the following:  The patient has a diffuse tenderness of the lumbar spine.  Lower extremity neurologic examination is unremarkable.  The lungs are clear the heart tones are normal the abdomen is soft the neck is supple the patient appears to be neurologically intact.  ED Course   Procedures  Labs Reviewed   POCT URINALYSIS W/O SCOPE          Imaging Results    None          Medications    dexAMETHasone injection 10 mg (10 mg Intramuscular Given 6/17/24 3140)     Medical Decision Making  Amount and/or Complexity of Data Reviewed  Labs: ordered.    Risk  Prescription drug management.    Given the above this patient has chronic lumbar back pain.  It radiates to the hips bilaterally, it is worse on the right.  The patient's symptoms are sharp and positional during the physical examination.  Urinalysis is negative there is no dysuria.  I will treat with steroids and discharge to outpatient evaluation and treatment.  The patient has no history of diabetes.  There is no evidence of cauda equina syndrome there is no trauma to suggest fracture.  The patient has been studied with a an MRI of the lumbar spine in the past.  I will refer to follow up with pain management, and neuro surgery.        Scribe Attestation:   Scribe #1: I performed the above scribed service and the documentation accurately describes the services I performed. I attest to the accuracy of the note.                           Please note that the documentation on this chart was provided by the scribe above on the date of service noted above, and that the documentation in the chart accurately reflects the work and decisions made by me alone.  Signed, Dr. Owen    Clinical Impression:  Final diagnoses:  [M54.40] Back pain of lumbar region with sciatica (Primary)          ED Disposition Condition    Discharge Stable          ED Prescriptions       Medication Sig Dispense Start Date End Date Auth. Provider    predniSONE (DELTASONE) 20 MG tablet (Expires today) Take 3 tablets (60 mg total) by mouth once daily. for 5 days 15 tablet 6/18/2024 6/23/2024 Fransisco Oewn MD          Follow-up Information       Follow up With Specialties Details Why Contact Philomena Singh PA-C Neurosurgery In 1 week  [address]  [address]  [place] [address]  [phone]      Smooth Lan Jr., MD Pain Medicine In 1 week  [address] [facility]  [address]  [place] [address]  [phone]               Fransisco Owen MD  06/23/24 1936

## 2024-07-12 NOTE — ED TRIAGE NOTES
Pt presents to ED via POV co of bright red stool, RL back pain, and abd pain for the last few days. Pt endorses being diagnosed with diverticulitis. Denies any other medical HX or taking any medications at this time. Denies SOB, CP, N/V.  
yes

## 2024-11-12 DIAGNOSIS — M16.0 BILATERAL PRIMARY OSTEOARTHRITIS OF HIP: Primary | ICD-10-CM

## 2025-08-09 ENCOUNTER — HOSPITAL ENCOUNTER (EMERGENCY)
Facility: HOSPITAL | Age: 55
Discharge: HOME | End: 2025-08-10
Attending: EMERGENCY MEDICINE
Payer: MEDICAID

## 2025-08-09 DIAGNOSIS — S00.83XA CONTUSION OF FOREHEAD, INITIAL ENCOUNTER: ICD-10-CM

## 2025-08-09 DIAGNOSIS — M25.559 HIP PAIN: ICD-10-CM

## 2025-08-09 DIAGNOSIS — M25.562 ACUTE PAIN OF BOTH KNEES: ICD-10-CM

## 2025-08-09 DIAGNOSIS — M25.561 ACUTE PAIN OF BOTH KNEES: ICD-10-CM

## 2025-08-09 DIAGNOSIS — S02.85XA FRACTURE OF RIGHT ORBITAL WALL: Primary | ICD-10-CM

## 2025-08-09 DIAGNOSIS — M25.511 ACUTE PAIN OF RIGHT SHOULDER: ICD-10-CM
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
## 2025-08-09 PROCEDURE — 25000003 PHARM REV CODE 250: Performed by: PHYSICIAN ASSISTANT

## 2025-08-09 PROCEDURE — 99285 EMERGENCY DEPT VISIT HI MDM: CPT | Mod: 25

## 2025-08-09 RX ORDER — IBUPROFEN 600 MG/1
600 TABLET, FILM COATED ORAL
Status: COMPLETED | OUTPATIENT
Start: 2025-08-09 | End: 2025-08-09

## 2025-08-09 RX ORDER — IBUPROFEN 600 MG/1
600 TABLET, FILM COATED ORAL EVERY 6 HOURS PRN
Qty: 20 TABLET | Refills: 0 | Status: SHIPPED | OUTPATIENT
Start: 2025-08-09

## 2025-08-09 RX ORDER — HYDROCODONE BITARTRATE AND ACETAMINOPHEN 5; 325 MG/1; MG/1
1 TABLET ORAL EVERY 6 HOURS PRN
Qty: 12 TABLET | Refills: 0 | Status: SHIPPED | OUTPATIENT
Start: 2025-08-09 | End: 2025-08-12

## 2025-08-09 RX ORDER — OXYCODONE AND ACETAMINOPHEN 5; 325 MG/1; MG/1
1 TABLET ORAL
Refills: 0 | Status: COMPLETED | OUTPATIENT
Start: 2025-08-09 | End: 2025-08-09

## 2025-08-09 RX ADMIN — IBUPROFEN 600 MG: 600 TABLET ORAL at 07:08

## 2025-08-09 RX ADMIN — OXYCODONE HYDROCHLORIDE AND ACETAMINOPHEN 1 TABLET: 5; 325 TABLET ORAL at 07:08

## 2025-08-10 VITALS
WEIGHT: 189 LBS | TEMPERATURE: 98 F | BODY MASS INDEX: 38.1 KG/M2 | DIASTOLIC BLOOD PRESSURE: 82 MMHG | OXYGEN SATURATION: 95 % | HEIGHT: 59 IN | HEART RATE: 74 BPM | RESPIRATION RATE: 16 BRPM | SYSTOLIC BLOOD PRESSURE: 129 MMHG

## 2025-08-11 ENCOUNTER — NURSE TRIAGE (OUTPATIENT)
Dept: ADMINISTRATIVE | Facility: CLINIC | Age: 55
End: 2025-08-11
Payer: MEDICAID

## 2025-08-26 ENCOUNTER — TELEPHONE (OUTPATIENT)
Dept: ORTHOPEDICS | Facility: CLINIC | Age: 55
End: 2025-08-26
Payer: MEDICAID

## 2025-09-04 DIAGNOSIS — S02.85XA ORBITAL FRACTURE, CLOSED, INITIAL ENCOUNTER: Primary | ICD-10-CM

## (undated) DEVICE — BAG TISS RETRV MONARCH 10MM

## (undated) DEVICE — SEE MEDLINE ITEM 146292

## (undated) DEVICE — CANISTER SUCTION 2 LTR

## (undated) DEVICE — APPLIER CLIP ENDO MED/LG 10MM

## (undated) DEVICE — Device

## (undated) DEVICE — TROCAR ENDOPATH XCEL 11MM 10CM

## (undated) DEVICE — PACK ENDOSCOPY GENERAL

## (undated) DEVICE — IRRIGATOR ENDOSCOPY DISP.

## (undated) DEVICE — SEE MEDLINE ITEM 152622

## (undated) DEVICE — BLADE SURG CARBON STEEL SZ11

## (undated) DEVICE — STAPLER SKIN PROXIMATE WIDE

## (undated) DEVICE — NDL INSUF ULTRA VERESS 120MM

## (undated) DEVICE — GLOVE BIOGEL ECLIPSE SZ 7.5

## (undated) DEVICE — SOL NS 1000CC

## (undated) DEVICE — DISSECTOR CURVED 5DCD

## (undated) DEVICE — ADHESIVE DERMABOND ADVANCED

## (undated) DEVICE — SEE MEDLINE ITEM 157117

## (undated) DEVICE — SYR 10CC LUER LOCK

## (undated) DEVICE — BLANKET UPPER BODY 78.7X29.9IN

## (undated) DEVICE — COVER OVERHEAD SURG LT BLUE

## (undated) DEVICE — TUBING INSUFFLATION 10

## (undated) DEVICE — SOL 9P NACL IRR PIC IL

## (undated) DEVICE — ELECTRODE REM PLYHSV RETURN 9

## (undated) DEVICE — KIT ANTIFOG

## (undated) DEVICE — CHLORAPREP W TINT 26ML APPL

## (undated) DEVICE — GLOVE BIOGEL ECLIPSE SZ 6

## (undated) DEVICE — NDL SAFETY 22G X 1.5 ECLIPSE

## (undated) DEVICE — SEE MEDLINE ITEM 146372

## (undated) DEVICE — SLEEVE SCD EXPRESS CALF MEDIUM

## (undated) DEVICE — TROCAR ENDOPATH XCEL 5X100MM